# Patient Record
Sex: MALE | Race: BLACK OR AFRICAN AMERICAN | NOT HISPANIC OR LATINO | Employment: OTHER | URBAN - METROPOLITAN AREA
[De-identification: names, ages, dates, MRNs, and addresses within clinical notes are randomized per-mention and may not be internally consistent; named-entity substitution may affect disease eponyms.]

---

## 2017-01-19 ENCOUNTER — CLINICAL SUPPORT (OUTPATIENT)
Dept: REHABILITATION | Facility: HOSPITAL | Age: 72
End: 2017-01-19
Attending: INTERNAL MEDICINE
Payer: MEDICARE

## 2017-01-19 DIAGNOSIS — R26.9 GAIT DIFFICULTY: Primary | ICD-10-CM

## 2017-01-19 DIAGNOSIS — M62.81 MUSCLE WEAKNESS OF LOWER EXTREMITY: ICD-10-CM

## 2017-01-19 PROCEDURE — 97110 THERAPEUTIC EXERCISES: CPT | Performed by: PHYSICAL MEDICINE & REHABILITATION

## 2017-01-19 PROCEDURE — 97162 PT EVAL MOD COMPLEX 30 MIN: CPT | Performed by: PHYSICAL MEDICINE & REHABILITATION

## 2017-01-19 PROCEDURE — G8978 MOBILITY CURRENT STATUS: HCPCS | Mod: CL | Performed by: PHYSICAL MEDICINE & REHABILITATION

## 2017-01-19 PROCEDURE — G8979 MOBILITY GOAL STATUS: HCPCS | Mod: CK | Performed by: PHYSICAL MEDICINE & REHABILITATION

## 2017-01-19 NOTE — PLAN OF CARE
Create Note         My Note 8:48 AM   Edit              Name:Goyo Craig  Physician:Chrissy Hayes MD  Date of eval:1/19/2017  Orders: Physical Therapy evaluate and treat/Gait Training  Clinic: [id number]  Diagnosis:  1. Gait difficulty      2. Muscle weakness of lower extremity            Precautions: CVA  Evaluation date: 1/19/2017  Visit # authorized: 1/25  Authorization period: 12/31/17  Plan of care expiration: 3/2/17  Medicare Charge: $140.00  Medicare Cap Total: $140.00     Start time: 9:00 AM  Stop Time:10:00AM      Procedures: IE, TE     Timed:  Procedure: TE x2  Minutes: 30     Untimed:  Procedure:IE  Minutes:30     Total Timed Minutes:60  Total Timed Units:2  Total Untimed Units:1  Charged Billed # of Units:3        Subjective      Chief complaint: Patient with c/o weakness in both lower extremities. Patient had CVA about 2 years ago. Patient states has difficulty walking and uses rollator and able to walk about a block. States just sits most of the time.  Onset of pain : No c/o pain just weakness BLE's   Mechanism of onset : CVA  PMH: HTN,CVA,     Aggravating factors: sit to stand transfers; walking  Easing factors: N/A  Sleep is not disturbed. Sleeping position: sides  PLOF includes: Decreased strength of BLE's with difficulty walking  Current functional limitations: assistance with preparing meals and housecleaning--family helps; mostly stays at home-rarely goes out for socialization; walking  Prior Physical Therapy: June to August, 2016 at [facility] PT clinic. States did not perform HEP while attending last PT session.   Home/Living Environment:Lives in an apartment alone and has family that comes and checks in on him daily.     Pertinent PMH/Comorbidities: CVA     Patients structured exercise routine: none  Exercise routine prior to onset: None     Occupation: Patient is retired..     Allergies: Review of patient's allergies indicates:  No Known Allergies        MRI: None on file  "  Xray: None on file     Pain level with 0 being the lowest and 10 being the highest presently: 0/10  Pain level with 0 being the lowest and 10 being the highest at worst: 0/10  Pain level with 0 being the lowest and 10 being the highest at best: 0/10     Patient Goals: "I want ti have more range of motion in my legs and to walk better."     Objective      Postural examination in standing and sitting:  - (+)  forward head  -  (+) forward shoulders  - (-)  hip high  -(-)  shoulder high  - (+) decreased lordosis lumbar spine     Functional assessment:   - walking/gait:Ambulates using rollator taking very short steps and slow pace and tends to drag right foot in toe off.  - sit to stand: Uses both hands to push up from sitting and tends tto rock upper body back and forth  - sit to supine: Uses both hands to lower down to chair and lowers himself slowly   - supine to sit: patient very slow but able to push from supine to sit  - supine to prone: NT      AROM of bilateral hips, knees and ankles WFL     Flexibility testing:  - hamstrings: 90/90 test R -45 L -45   - gastrocnemius: DF ankle R 10 degrees L 10 degrees      - quadriceps: (+) tightness bilatrally   - hip flexors (+) Tightness bilaterally        Muscle Strength  MMT R L   Hip flexion 3/5 3/5   Hip abduction 3/5 3/5   Hip extension 3/5 3/5   Hip ER 3/5 3/5   Hip IR 3/5 3/5   Knee extension 4/5 4/5   Knee flexion 4/5 4/5   Ankle dorsiflexion 4/5 4/5   Ankle plantar flexion 4/5 4/5   Ankle inversion 4/5 4/5   Ankle eversion 4/5 4/5      Endurance is fair.     Special tests: The Timed Up and Go: 127 seconds  30 Second Chair stand Test: 0--uses hands  [place] Balance Test: 22/56  Poor balance and high risk for fall        Sensation: Intact     Outcome measures:   Impairment function: Mobility  FOTO score: 23/100  · G-code current: CL at least 60%, but < 80% impaired, limited or restricted  · G-code goal: CK at least 40% but < 60% impaired, limited or " restricted  · Severity modifier selections based on the FOTO scoring, objective findings, and co-morbidity assessment  · PT reviewed FOTO scores for Goyo Craig on 1/19/17.   · FOTO scores were entered into [employer] - see media section.     TREATMENT:  Therapeutic exercise: Goyo Kong received therapeutic exercises to develop strength, stabilization and endurance; flexibility and range of motion for 30 minutes including:see HEP sheet.   Supine:  Glut Sets X 10  Quad Sets X 10  Sitting:  Marching X 10  Hip ADD with pillow X 10  Hip ABd with YTB X 10  LAQ X 10        Pt. Education: Instructed pt. regarding:proper technique with all exercises. Pt. to demonstrate good understanding of the education provided. Goyo Kong demonstrated good return demonstration of activities. No cultural, environmental, or spiritual barriers identified to treatment or learning.     Assessment   This is a 71 y.o. male referred to outpatient physical therapy and presents with a medical diagnosis of bilateral lower extremity weakness and PT diagnosis/findings of bilateral lower extremity decreased strength and flexibility, poor balance and high risk for falls; gait instability demonstrating limitations as described in the problem list. Patient was in agreement with set goals and plan of care. Pt was given a HEP consisting of BLE strengthening regimen. Pt. verbally understood instructions and demonstrated proper form/technique. Pt was advised to perform these exercises free of pain, and discontinue use if symptoms persist/worsen. Pt will benefit from physical therapy services in order to maximize pain free functional independence. Rehab potential is fair.     History  Co-morbidities and personal factors that may impact the plan of care Examination  Body Structures and Functions, activity limitations and participation restrictions that may impact the plan of care Clinical Presentation Decision Making/ Complexity Score   Co-morbidities:    CVA                    Personal Factors:   AGE, education level, Attitude Body Regions: bilateral LE's     Body Systems: Musculoskeletal--decreased strength and flexibility; Neuromuscular: poor balance and gait instability              Activity limitations/Participation Restrictions: Mobility, general tasks and self-care; community and social life                       Evolving clinical presentation with changing clinical characteristics    Moderate           FOTO Score: 23/100            Medical necessity is demonstrated by the following IMPAIRMENTS/PROBLEM LIST:  Decreased flexibility BLE  Decreased strength BLE  Antalgic gait  Decreased ability with sit to stand; sit to supine and supine to sit transfers  Difficulty with performing ADL and household activities due to poor balance and LE weakness  Poor balance with standing and walking  Functional impairment rating of: 70%     GOALS:   Short Term Goals: 3 weeks  MORGAN Balance score= 36 or greater to improve balance with standing and walking  Increase strength 1/2 muscle grade bilateral LE's  Be able to perform HEP with minimal cueing required  Improve functional impairment of mobility to 50%     Long Term Goals: 6 weeks  IBerg Balance Score =40 or greater for improved balance with standing and walking and to decrease fall risk  Improve muscle strength 1 muscle grade  Improve muscle strength with MMT to 4/5 to 4+/5  Restore ability to ambulate with improved gait pattern using rollator in house and community  Restore ability to stand for ADL with improved balance  Restore ability to perform all transfers safely and independently  Restore ability to perform ADL's and household activities independently with improved balance  Improve functional impairment of mobility to 40%     Plan      Pt will be treated by physical therapy 1-3 times a week for 6 weeks to include: Therapeutic exercises to increase ROM, strength and stabilization; joint and soft tissue mobilization  with manual therapy techniques to decrease muscle tightness, pain and improve joint mobility; neuromuscular re-education to improve balance, coordination, kinesthetic sense and proprioception, therapeutic activities to improve coordination, strength and function, therapeutic taping to decrease pain, provide support and improve function of the LE(s); modalities such as moist heat, ice, ultrasound and electrical stimulation to increase circulation, decrease pain and inflammation; dry needling with manual therapy techniques to decrease pain, inflammation and swelling, increase circulation and promote healing process will be considered and utilized as needed; aquatic physical therapy will be utilized as needed. Pt may be seen by PTA to carry out plan of care as part of the Rehab team.     I certify the need for these services furnished under this plan of treatment and while under my care.     ____________________________________ Physician/Referring Practitioner               Date of Signature                 Marlyn Goodman PT

## 2017-01-19 NOTE — PROGRESS NOTES
Name:Goyo Kong Rafa  Physician:Chrissy Hayes MD  Date of eval:1/19/2017  Orders:  Physical Therapy evaluate and treat/Gait Training  Clinic: [id number]  Diagnosis:  1. Gait difficulty     2. Muscle weakness of lower extremity         Precautions: CVA  Evaluation date: 1/19/2017  Visit # authorized: 1/25  Authorization period: 12/31/17  Plan of care expiration: 3/2/17  Medicare Charge: $140.00  Medicare Cap Total: $140.00    Start time: 9:00 AM  Stop Time:10:00AM     Procedures:  IE, TE    Timed:  Procedure: TE x2  Minutes: 30    Untimed:  Procedure:IE  Minutes:30    Total Timed Minutes:60  Total Timed Units:2  Total Untimed Units:1  Charged Billed # of Units:3      Subjective     Chief complaint: Patient with c/o weakness in both lower extremities. Patient had CVA about 2 years ago.  Patient states has difficulty walking and uses rollator and able to walk about a block. States just sits most of the time.  Onset of pain : No c/o pain just weakness BLE's   Mechanism of onset :  CVA  PMH: HTN,CVA,    Aggravating factors: sit to stand transfers; walking  Easing factors: N/A  Sleep is not disturbed. Sleeping position: sides  PLOF includes: Decreased strength of BLE's with difficulty walking  Current functional limitations: assistance with preparing meals and housecleaning--family helps; mostly stays at home-rarely goes out for socialization; walking  Prior Physical Therapy: June to August, 2016 at [facility] PT clinic. States did not perform HEP while attending last PT session.   Home/Living Environment:Lives in an apartment alone and has family that comes and checks in on him daily.    Pertinent PMH/Comorbidities: CVA    Patients structured exercise routine: none  Exercise routine prior to onset: None    Occupation:  Patient is retired..    Allergies:  Review of patient's allergies indicates:  No Known Allergies      MRI: None on file           Xray: None on file    Pain level with 0 being the lowest and 10 being the  "highest presently: 0/10  Pain level with 0 being the lowest and 10 being the highest at worst: 0/10  Pain level with 0 being the lowest and 10 being the highest at best: 0/10     Patient Goals: "I want ti have more range of motion in my legs and to walk better."    Objective     Postural examination in standing and sitting:  -  (+)  forward head  -   (+) forward shoulders  - (-)  hip high  -(-)  shoulder high  - (+) decreased lordosis lumbar spine    Functional assessment:   - walking/gait:Ambulates using rollator taking very short steps and slow pace and tends to drag right foot in toe off.  - sit to stand: Uses both hands to push up from sitting and tends tto rock upper body back and forth  - sit to supine: Uses both hands to lower down to chair and lowers himself slowly       - supine to sit: patient very slow but able to push from supine to sit  - supine to prone: NT     AROM of bilateral hips, knees and ankles WFL    Flexibility testing:  - hamstrings:     90/90 test R -45 L -45           - gastrocnemius:   DF ankle R 10 degrees L 10 degrees                 - quadriceps: (+) tightness bilatrally               - hip flexors    (+)   Tightness bilaterally      Muscle Strength  MMT R L   Hip flexion 3/5 3/5   Hip abduction 3/5 3/5   Hip extension 3/5 3/5   Hip ER 3/5 3/5   Hip IR 3/5 3/5   Knee extension 4/5 4/5   Knee flexion 4/5 4/5   Ankle dorsiflexion 4/5 4/5   Ankle plantar flexion 4/5 4/5   Ankle inversion 4/5 4/5   Ankle eversion 4/5 4/5     Endurance is fair.    Special tests: The Timed Up and Go: 127 seconds                          30 Second Chair stand Test: 0--uses hands                          [place] Balance Test:  22/56  Poor balance and high risk for fall      Sensation: Intact    Outcome measures:    Impairment function:  Mobility  FOTO  score: 23/100  G-code current: CL at least 60%, but < 80% impaired, limited or restricted  G-code goal: CK at least 40% but < 60% impaired, limited or " restricted  Severity modifier selections based on the FOTO scoring, objective findings, and co-morbidity assessment  PT reviewed FOTO scores for Goyo Craig on 1/19/17.   FOTO scores were entered into [employer] - see media section.    TREATMENT:  Therapeutic exercise: Goyo Kong received therapeutic exercises to develop strength, stabilization and endurance; flexibility and range of motion for 30 minutes including:see HEP sheet.   Supine:  Glut Sets  X 10  Quad Sets  X 10  Sitting:  Marching  X 10  Hip ADD with pillow   X 10  Hip ABd with YTB    X 10  LAQ     X 10      Pt. Education: Instructed pt. regarding:proper technique with all exercises. Pt. to demonstrate good understanding of the education provided. Goyo Kong demonstrated good return demonstration of activities. No cultural, environmental, or spiritual barriers identified to treatment or learning.    Assessment   This is a 71 y.o. male referred to outpatient physical therapy and presents with a medical diagnosis of bilateral lower extremity weakness and PT diagnosis/findings of bilateral lower extremity decreased strength and flexibility, poor balance and high risk for falls; gait instability demonstrating limitations as described in the problem list. Patient was in agreement with set goals and plan of care. Pt was given a HEP consisting of BLE strengthening regimen. Pt. verbally understood instructions and demonstrated proper form/technique. Pt was advised to perform these exercises free of pain, and discontinue use if symptoms persist/worsen. Pt will benefit from physical therapy services in order to maximize pain free functional independence. Rehab potential is fair.    History  Co-morbidities and personal factors that may impact the plan of care Examination  Body Structures and Functions, activity limitations and participation restrictions that may impact the plan of care Clinical Presentation   Decision Making/ Complexity Score   Co-morbidities:    CVA              Personal Factors:   AGE, education level, Attitude Body Regions: bilateral LE's    Body Systems:  Musculoskeletal--decreased strength and flexibility; Neuromuscular: poor balance and gait instability          Activity limitations/Participation Restrictions:  Mobility, general tasks and self-care; community and social life                  Evolving clinical presentation with changing clinical characteristics   Moderate        FOTO Score: 23/100         Medical necessity is demonstrated by the following IMPAIRMENTS/PROBLEM LIST:  Decreased flexibility BLE  Decreased strength BLE  Antalgic gait  Decreased ability with sit to stand; sit to supine and supine to sit transfers  Difficulty with performing ADL and household activities due to poor balance and LE weakness  Poor balance with standing and walking  Functional impairment rating of: 70%    GOALS:   Short Term Goals:  3 weeks  MORGAN Balance score= 36 or greater to improve balance with standing and walking  Increase strength 1/2 muscle grade bilateral LE's  Be able to perform HEP with minimal cueing required  Improve functional impairment of mobility to 50%    Long Term Goals: 6 weeks  IBerg Balance Score =40 or greater for improved balance with standing and walking and to decrease fall risk  Improve muscle strength 1 muscle grade  Improve muscle strength with MMT to 4/5 to 4+/5  Restore ability to ambulate with improved gait pattern using rollator in house and community  Restore ability to stand for ADL with improved balance  Restore ability to perform all transfers safely and independently  Restore ability to perform ADL's and household activities independently with improved balance  Improve functional impairment of mobility to 40%    Plan     Pt will be treated by physical therapy 1-3 times a week for 6 weeks to include: Therapeutic exercises to increase ROM, strength and stabilization; joint and soft tissue mobilization with manual therapy  techniques to decrease muscle tightness, pain and improve joint mobility; neuromuscular re-education to improve balance, coordination, kinesthetic sense and proprioception, therapeutic activities to improve coordination, strength and function, therapeutic taping to decrease pain, provide support and improve function of the LE(s); modalities such as moist heat, ice, ultrasound and electrical stimulation to increase circulation, decrease pain and inflammation; dry needling with manual therapy techniques to decrease pain, inflammation and swelling, increase circulation and promote healing process will be considered and utilized as needed;  aquatic physical therapy will be utilized as needed.  Pt may be seen by PTA to carry out plan of care as part of the Rehab team.    I certify the need for these services furnished under this plan of treatment and while under my care.    ____________________________________ Physician/Referring Practitioner                                Date of Signature            Marlyn Goodman PT

## 2017-01-21 ENCOUNTER — HOSPITAL ENCOUNTER (OUTPATIENT)
Facility: HOSPITAL | Age: 72
Discharge: HOME | End: 2017-01-23
Attending: EMERGENCY MEDICINE | Admitting: INTERNAL MEDICINE
Payer: MEDICARE

## 2017-01-21 DIAGNOSIS — R55 SYNCOPE AND COLLAPSE: Primary | ICD-10-CM

## 2017-01-21 DIAGNOSIS — R00.1 BRADYCARDIA: ICD-10-CM

## 2017-01-21 LAB
ALBUMIN SERPL BCP-MCNC: 3.2 G/DL
ALP SERPL-CCNC: 77 U/L
ALT SERPL W/O P-5'-P-CCNC: 13 U/L
ANION GAP SERPL CALC-SCNC: 12 MMOL/L
AST SERPL-CCNC: 18 U/L
BASOPHILS # BLD AUTO: 0.01 K/UL
BASOPHILS NFR BLD: 0.1 %
BILIRUB SERPL-MCNC: 0.5 MG/DL
BUN SERPL-MCNC: 13 MG/DL
CALCIUM SERPL-MCNC: 8.7 MG/DL
CHLORIDE SERPL-SCNC: 112 MMOL/L
CO2 SERPL-SCNC: 16 MMOL/L
CREAT SERPL-MCNC: 0.8 MG/DL
DIFFERENTIAL METHOD: ABNORMAL
EOSINOPHIL # BLD AUTO: 0.1 K/UL
EOSINOPHIL NFR BLD: 1.1 %
ERYTHROCYTE [DISTWIDTH] IN BLOOD BY AUTOMATED COUNT: 15.2 %
EST. GFR  (AFRICAN AMERICAN): >60 ML/MIN/1.73 M^2
EST. GFR  (NON AFRICAN AMERICAN): >60 ML/MIN/1.73 M^2
GLUCOSE SERPL-MCNC: 108 MG/DL
HCT VFR BLD AUTO: 42.5 %
HGB BLD-MCNC: 13.8 G/DL
INR PPP: 1.3
LYMPHOCYTES # BLD AUTO: 1.5 K/UL
LYMPHOCYTES NFR BLD: 18.8 %
MCH RBC QN AUTO: 28.5 PG
MCHC RBC AUTO-ENTMCNC: 32.5 %
MCV RBC AUTO: 88 FL
MONOCYTES # BLD AUTO: 0.6 K/UL
MONOCYTES NFR BLD: 6.9 %
NEUTROPHILS # BLD AUTO: 5.9 K/UL
NEUTROPHILS NFR BLD: 73 %
PLATELET # BLD AUTO: 187 K/UL
PMV BLD AUTO: 10.3 FL
POTASSIUM SERPL-SCNC: 4.8 MMOL/L
PROT SERPL-MCNC: 6.7 G/DL
PROTHROMBIN TIME: 14.7 SEC
RBC # BLD AUTO: 4.85 M/UL
SODIUM SERPL-SCNC: 140 MMOL/L
TROPONIN I SERPL DL<=0.01 NG/ML-MCNC: <0.006 NG/ML
WBC # BLD AUTO: 8.07 K/UL

## 2017-01-21 PROCEDURE — 80053 COMPREHEN METABOLIC PANEL: CPT

## 2017-01-21 PROCEDURE — 85610 PROTHROMBIN TIME: CPT

## 2017-01-21 PROCEDURE — 85025 COMPLETE CBC W/AUTO DIFF WBC: CPT

## 2017-01-21 PROCEDURE — 99285 EMERGENCY DEPT VISIT HI MDM: CPT

## 2017-01-21 PROCEDURE — 93005 ELECTROCARDIOGRAM TRACING: CPT

## 2017-01-21 PROCEDURE — G0378 HOSPITAL OBSERVATION PER HR: HCPCS

## 2017-01-21 PROCEDURE — 93010 ELECTROCARDIOGRAM REPORT: CPT | Mod: ,,, | Performed by: INTERNAL MEDICINE

## 2017-01-21 PROCEDURE — 84484 ASSAY OF TROPONIN QUANT: CPT

## 2017-01-21 PROCEDURE — 25000003 PHARM REV CODE 250: Performed by: EMERGENCY MEDICINE

## 2017-01-21 RX ORDER — ASPIRIN 325 MG
325 TABLET ORAL
Status: COMPLETED | OUTPATIENT
Start: 2017-01-21 | End: 2017-01-21

## 2017-01-21 RX ORDER — TAMSULOSIN HYDROCHLORIDE 0.4 MG/1
0.4 CAPSULE ORAL DAILY
Status: DISCONTINUED | OUTPATIENT
Start: 2017-01-22 | End: 2017-01-23 | Stop reason: HOSPADM

## 2017-01-21 RX ORDER — SODIUM CHLORIDE 9 MG/ML
INJECTION, SOLUTION INTRAVENOUS CONTINUOUS
Status: DISCONTINUED | OUTPATIENT
Start: 2017-01-21 | End: 2017-01-23 | Stop reason: HOSPADM

## 2017-01-21 RX ORDER — ENOXAPARIN SODIUM 100 MG/ML
40 INJECTION SUBCUTANEOUS EVERY 24 HOURS
Status: DISCONTINUED | OUTPATIENT
Start: 2017-01-22 | End: 2017-01-23 | Stop reason: HOSPADM

## 2017-01-21 RX ORDER — LISINOPRIL 5 MG/1
5 TABLET ORAL DAILY
Status: DISCONTINUED | OUTPATIENT
Start: 2017-01-22 | End: 2017-01-22

## 2017-01-21 RX ORDER — NAPROXEN SODIUM 220 MG/1
81 TABLET, FILM COATED ORAL DAILY
Status: DISCONTINUED | OUTPATIENT
Start: 2017-01-22 | End: 2017-01-23 | Stop reason: HOSPADM

## 2017-01-21 RX ADMIN — ASPIRIN 325 MG ORAL TABLET 325 MG: 325 PILL ORAL at 07:01

## 2017-01-21 NOTE — IP AVS SNAPSHOT
[facility]  [address]  [place] [address]  Phone: [phone]           Patient Discharge Instructions     Our goal is to set you up for success. This packet includes information on your condition, medications, and your home care. It will help you to care for yourself so you don't get sicker and need to go back to the hospital.     Please ask your nurse if you have any questions.        There are many details to remember when preparing to leave the hospital. Here is what you will need to do:    1. Take your medicine. If you are prescribed medications, review your Medication List in the following pages. You may have new medications to  at the pharmacy and others that you'll need to stop taking. Review the instructions for how and when to take your medications. Talk with your doctor or nurses if you are unsure of what to do.     2. Go to your follow-up appointments. Specific follow-up information is listed in the following pages. Your may be contacted by a transition nurse or clinical provider about future appointments. Be sure we have all of the phone numbers to reach you, if needed. Please contact your provider's office if you are unable to make an appointment.     3. Watch for warning signs. Your doctor or nurse will give you detailed warning signs to watch for and when to call for assistance. These instructions may also include educational information about your condition. If you experience any of warning signs to your health, call your doctor.               ** Verify the list of medication(s) below is accurate and up to date. Carry this with you in case of emergency. If your medications have changed, please notify your healthcare provider.             Medication List      START taking these medications        Additional Info                      atorvastatin 20 MG tablet   Commonly known as:  LIPITOR   Quantity:  90 tablet   Refills:  3   Dose:  20 mg    Last time this was given:   20 mg on 1/23/2017  9:40 AM   Instructions:  Take 1 tablet (20 mg total) by mouth once daily.     Begin Date    AM    Noon    PM    Bedtime         CONTINUE taking these medications        Additional Info                      aspirin 81 MG Chew   Refills:  0   Dose:  81 mg    Last time this was given:  81 mg on 1/23/2017  9:40 AM   Instructions:  Take 1 tablet (81 mg total) by mouth once daily.     Begin Date    AM    Noon    PM    Bedtime       baclofen 10 MG tablet   Commonly known as:  LIORESAL   Refills:  0   Dose:  10 mg    Instructions:  Take 10 mg by mouth 3 (three) times daily as needed (muscle spasm).     Begin Date    AM    Noon    PM    Bedtime       cilostazol 100 MG Tab   Commonly known as:  PLETAL   Refills:  0   Dose:  50 mg    Instructions:  Take 50 mg by mouth 2 (two) times daily.     Begin Date    AM    Noon    PM    Bedtime       lisinopril 5 MG tablet   Commonly known as:  PRINIVIL,ZESTRIL   Refills:  0   Dose:  5 mg    Last time this was given:  5 mg on 1/23/2017  9:40 AM   Instructions:  Take 5 mg by mouth once daily.     Begin Date    AM    Noon    PM    Bedtime       tamsulosin 0.4 mg Cp24   Commonly known as:  FLOMAX   Quantity:  30 capsule   Refills:  11   Dose:  0.4 mg    Last time this was given:  0.4 mg on 1/23/2017  9:40 AM   Instructions:  Take 1 capsule (0.4 mg total) by mouth once daily.     Begin Date    AM    Noon    PM    Bedtime            Where to Get Your Medications      These medications were sent to [facility] mail order [address], [place] [address] [facility] mail services  [facility] [facility] [address]     Phone:  [phone]     atorvastatin 20 MG tablet                  Please bring to all follow up appointments:    1. A copy of your discharge instructions.  2. All medicines you are currently taking in their original bottles.  3. Identification and insurance card.    Please arrive 15 minutes ahead of scheduled appointment  time.    Please call 24 hours in advance if you must reschedule your appointment and/or time.        Your Scheduled Appointments     Jan 24, 2017 10:00 AM CST   Established Physical Therapy with Rory Boo PTA   [facility]    [address]  [place] [address]   [phone]            Jan 26, 2017 11:00 AM CST   Established Physical Therapy with Marlyn Goodman, PT   [facility]    [address]  [place] [address]   [phone]            Jan 31, 2017 10:00 AM CST   Established Physical Therapy with Rory Boo PTA   [facility]    [address]  [place] [address]   [phone]            Feb 02, 2017 10:00 AM CST   Established Physical Therapy with Rory Boo PTA   [facility]    [address]  [place] [address]   [phone]            Feb 07, 2017 10:20 AM CST   Established Patient Visit with Sean Rios MD   [facility]    [address] [facility] [facility], [address]  [place] [address]   [phone]              Follow-up Information     Follow up with Chrissy Hayes MD. Go on 1/31/2017.    Specialty:  Internal Medicine    Why:  @3pm    Contact information:    [address]  [address]  [facility] [place] [address]  [phone]          Follow up with Sean Rios MD. Go on 2/7/2017.    Specialty:  Cardiology    Why:  @10:20am    Contact information:    [address]  [address]  [place] [address]  [phone]        Referrals     Future Orders    Referral to OutPatient  Physical therapy     Questions:    Post Surgical?:  No    Eval and Treat:  Yes    Duration:      Frequency (times per week):      Precautions:      Location:      OT Location:      Restore Functional ADL Training?:      Gait Training:      Therapeutic Exercises:      Wound Care:      Traction:      Electric Stimulation:   "    IONTO.:      U/S:      Developmental Stimulation?:      Specialty Programs:          Discharge Instructions     Future Orders    Activity as tolerated     Call MD for:  difficulty breathing or increased cough     Call MD for:  increased confusion or weakness     Call MD for:  persistent dizziness, light-headedness, or visual disturbances     Call MD for:  persistent nausea and vomiting or diarrhea     Call MD for:  severe persistent headache     Call MD for:  severe uncontrolled pain     Call MD for:  temperature >100.4     Call MD for:  worsening rash     Call MD for:     Comments:    Any other concerning symptoms    Diet general     Questions:    Total calories:      Fat restriction, if any:      Protein restriction, if any:      Na restriction, if any:      Fluid restriction:      Additional restrictions:        Discharge References/Attachments     SYNCOPE, DIAGNOSING (ENGLISH)    SYNCOPE, CAUSES OF (ENGLISH)    SYNCOPE, TREATING: PREVENTION (ENGLISH)    ATORVASTATIN (ENGLISH)        Primary Diagnosis     Your primary diagnosis was:  Fainting      Admission Information     Date & Time Provider Department CSN    1/21/2017  2:56 PM Mario Thompson MD [facility] [id number]      Care Providers     Provider Role Specialty Primary office phone    Mario Thompson MD Attending Provider Internal Medicine [phone]    Jim Ni MD Team Attending  Internal Medicine [phone]    Mario Thompson MD Team Attending  Internal Medicine [phone]    Shashi Yoo MD Consulting Physician  Cardiology [phone]      Your Vitals Were     BP Pulse Temp Resp Height Weight    152/105 (BP Location: Right arm, Patient Position: Standing, BP Method: Automatic) 80 98 °F (36.7 °C) (Oral) 20 5' 7" (1.702 m) 82.9 kg (182 lb 11.2 oz)    SpO2 BMI             94% 28.61 kg/m2         Recent Lab Values        12/31/2014                           9:55 AM           A1C 4.6                       Allergies " as of 1/23/2017     No Known Allergies      [facility]     [facility] Nurse Care Line - 24/7 Assistance  Unless otherwise directed by your provider, please contact [facility], our nurse care line that is available for 24/7 assistance.     Registered nurses in the [facility] provide clinical advisement, health education, appointment booking, and other advisory services.  Call for this free service at [phone].        Advance Directives     An advance directive is a document which, in the event you are no longer able to make decisions for yourself, tells your healthcare team what kind of treatment you do or do not want to receive, or who you would like to make those decisions for you.  If you do not currently have an advance directive, [facility] encourages you to create one.  For more information call:  [phone]), [phone] ([phone]),  or log on to [email or web address].        Smoking Cessation     If you would like to quit smoking:   You may be eligible for free services if you are a [place] resident and started smoking cigarettes before September 1, 1988.  Call the Smoking Cessation Trust ([facility]) toll free at (985) 409-3839 or (885) 838-4021.   Call 3-855-QUIT-NOW if you do not meet the above criteria.            Language Assistance Services     ATTENTION: Language assistance services are available, free of charge. Please call [phone].      ATENCIÓN: Si habla español, tiene a banda disposición servicios gratuitos de asistencia lingüística. Llame al [phone].     CHÚ Ý: N?u b?n nói Ti?ng Vi?t, có các d?ch v? h? tr? ngôn ng? mi?n phí dành cho b?n. G?i s? [phone].        [facility] Sign-Up     Activating your [facility] account is as easy as 1-2-3!     1) Visit [email or web address], select Sign Up Now, enter this activation code and your date of birth, then select Next.  [email or web address]  Expires: 3/9/2017  4:07 PM      2) Create a username and  password to use when you visit [facility] in the future and select a security question in case you lose your password and select Next.    3) Enter your e-mail address and click Sign Up!    Additional Information  If you have questions, please e-mail Alex and [email or web address] or call [phone] to talk to our [facility] staff. Remember, [facility] is NOT to be used for urgent needs. For medical emergencies, dial 911.          [facility] complies with applicable Federal civil rights laws and does not discriminate on the basis of race, color, national origin, age, disability, or sex.

## 2017-01-21 NOTE — ED NOTES
Pt reports to ed from nursing home following syncopal episode. EMS states that they were called with report of non-responsive pt, not breathing. Upon EMS arrival, pt was aaox4, breathing, stable vitals. Upon arrival to hospital, pt is oriented only to place and self. Skin cool. Bradycardic on cardiac monitor. Reports generalized weakness, denies pain. Hand  strong bilaterally.
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
## 2017-01-22 LAB
ALBUMIN SERPL BCP-MCNC: 3 G/DL
ALP SERPL-CCNC: 70 U/L
ALT SERPL W/O P-5'-P-CCNC: 10 U/L
ANION GAP SERPL CALC-SCNC: 7 MMOL/L
AST SERPL-CCNC: 12 U/L
BASOPHILS # BLD AUTO: 0.01 K/UL
BASOPHILS NFR BLD: 0.2 %
BILIRUB SERPL-MCNC: 0.5 MG/DL
BUN SERPL-MCNC: 13 MG/DL
CALCIUM SERPL-MCNC: 8.9 MG/DL
CHLORIDE SERPL-SCNC: 111 MMOL/L
CHOLEST/HDLC SERPL: 2.7 {RATIO}
CO2 SERPL-SCNC: 23 MMOL/L
CREAT SERPL-MCNC: 0.8 MG/DL
DIFFERENTIAL METHOD: ABNORMAL
EOSINOPHIL # BLD AUTO: 0.1 K/UL
EOSINOPHIL NFR BLD: 2.4 %
ERYTHROCYTE [DISTWIDTH] IN BLOOD BY AUTOMATED COUNT: 15.3 %
EST. GFR  (AFRICAN AMERICAN): >60 ML/MIN/1.73 M^2
EST. GFR  (NON AFRICAN AMERICAN): >60 ML/MIN/1.73 M^2
FERRITIN SERPL-MCNC: 690 NG/ML
GLUCOSE SERPL-MCNC: 79 MG/DL
HCT VFR BLD AUTO: 39.3 %
HDL/CHOLESTEROL RATIO: 36.8 %
HDLC SERPL-MCNC: 125 MG/DL
HDLC SERPL-MCNC: 46 MG/DL
HGB BLD-MCNC: 12.9 G/DL
IRON SERPL-MCNC: 59 UG/DL
LDLC SERPL CALC-MCNC: 66 MG/DL
LYMPHOCYTES # BLD AUTO: 2 K/UL
LYMPHOCYTES NFR BLD: 40 %
MAGNESIUM SERPL-MCNC: 1.8 MG/DL
MCH RBC QN AUTO: 28 PG
MCHC RBC AUTO-ENTMCNC: 32.8 %
MCV RBC AUTO: 85 FL
MONOCYTES # BLD AUTO: 0.4 K/UL
MONOCYTES NFR BLD: 8.9 %
NEUTROPHILS # BLD AUTO: 2.4 K/UL
NEUTROPHILS NFR BLD: 48.5 %
NONHDLC SERPL-MCNC: 79 MG/DL
PHOSPHATE SERPL-MCNC: 2.7 MG/DL
PLATELET # BLD AUTO: 170 K/UL
PMV BLD AUTO: 10.4 FL
POTASSIUM SERPL-SCNC: 3.7 MMOL/L
PROT SERPL-MCNC: 6.5 G/DL
RBC # BLD AUTO: 4.6 M/UL
SATURATED IRON: 25 %
SODIUM SERPL-SCNC: 141 MMOL/L
TOTAL IRON BINDING CAPACITY: 235 UG/DL
TRANSFERRIN SERPL-MCNC: 159 MG/DL
TRIGL SERPL-MCNC: 65 MG/DL
TROPONIN I SERPL DL<=0.01 NG/ML-MCNC: 0.01 NG/ML
TROPONIN I SERPL DL<=0.01 NG/ML-MCNC: 0.01 NG/ML
WBC # BLD AUTO: 4.92 K/UL

## 2017-01-22 PROCEDURE — 36415 COLL VENOUS BLD VENIPUNCTURE: CPT

## 2017-01-22 PROCEDURE — 25000003 PHARM REV CODE 250: Performed by: STUDENT IN AN ORGANIZED HEALTH CARE EDUCATION/TRAINING PROGRAM

## 2017-01-22 PROCEDURE — 80053 COMPREHEN METABOLIC PANEL: CPT

## 2017-01-22 PROCEDURE — 85025 COMPLETE CBC W/AUTO DIFF WBC: CPT

## 2017-01-22 PROCEDURE — 84466 ASSAY OF TRANSFERRIN: CPT

## 2017-01-22 PROCEDURE — 93010 ELECTROCARDIOGRAM REPORT: CPT | Mod: 76,,, | Performed by: INTERNAL MEDICINE

## 2017-01-22 PROCEDURE — 63600175 PHARM REV CODE 636 W HCPCS: Performed by: STUDENT IN AN ORGANIZED HEALTH CARE EDUCATION/TRAINING PROGRAM

## 2017-01-22 PROCEDURE — 84484 ASSAY OF TROPONIN QUANT: CPT | Mod: 91

## 2017-01-22 PROCEDURE — 84484 ASSAY OF TROPONIN QUANT: CPT

## 2017-01-22 PROCEDURE — 97530 THERAPEUTIC ACTIVITIES: CPT

## 2017-01-22 PROCEDURE — 97161 PT EVAL LOW COMPLEX 20 MIN: CPT

## 2017-01-22 PROCEDURE — 93005 ELECTROCARDIOGRAM TRACING: CPT

## 2017-01-22 PROCEDURE — G8978 MOBILITY CURRENT STATUS: HCPCS | Mod: CK

## 2017-01-22 PROCEDURE — 83735 ASSAY OF MAGNESIUM: CPT

## 2017-01-22 PROCEDURE — 82728 ASSAY OF FERRITIN: CPT

## 2017-01-22 PROCEDURE — 97116 GAIT TRAINING THERAPY: CPT | Mod: 59

## 2017-01-22 PROCEDURE — G8979 MOBILITY GOAL STATUS: HCPCS | Mod: CJ

## 2017-01-22 PROCEDURE — 83540 ASSAY OF IRON: CPT

## 2017-01-22 PROCEDURE — G0378 HOSPITAL OBSERVATION PER HR: HCPCS

## 2017-01-22 PROCEDURE — 84100 ASSAY OF PHOSPHORUS: CPT

## 2017-01-22 PROCEDURE — 94761 N-INVAS EAR/PLS OXIMETRY MLT: CPT

## 2017-01-22 PROCEDURE — 80061 LIPID PANEL: CPT

## 2017-01-22 PROCEDURE — 93010 ELECTROCARDIOGRAM REPORT: CPT | Mod: ,,, | Performed by: INTERNAL MEDICINE

## 2017-01-22 RX ADMIN — ENOXAPARIN SODIUM 40 MG: 100 INJECTION SUBCUTANEOUS at 12:01

## 2017-01-22 RX ADMIN — SODIUM CHLORIDE: 0.9 INJECTION, SOLUTION INTRAVENOUS at 01:01

## 2017-01-22 RX ADMIN — TAMSULOSIN HYDROCHLORIDE 0.4 MG: 0.4 CAPSULE ORAL at 09:01

## 2017-01-22 RX ADMIN — SODIUM CHLORIDE: 0.9 INJECTION, SOLUTION INTRAVENOUS at 11:01

## 2017-01-22 RX ADMIN — ASPIRIN 81 MG 81 MG: 81 TABLET ORAL at 09:01

## 2017-01-22 RX ADMIN — SODIUM CHLORIDE: 0.9 INJECTION, SOLUTION INTRAVENOUS at 12:01

## 2017-01-22 NOTE — PLAN OF CARE
01/22/17 1158   NAM Message   Medicare Outpatient and Observation Notification regarding financial responsibility Given to patient/caregiver;Signed/date by patient/caregiver   Date NAM was signed 01/22/17   Time NAM was signed 1151     Angela Dalton RN Transitional Navigator  [phone]

## 2017-01-22 NOTE — PLAN OF CARE
Problem: Patient Care Overview  Goal: Plan of Care Review  Outcome: Ongoing (interventions implemented as appropriate)  PT is in bed with no S/S of pain or distress. He is on fall precautions with bed lowered, wheels locked, and call bell within reach. PT is NSR on tele, with no ectopy. He has NS running at 100c per hour. He has not had any syncope episodes and is currently stable. Will continue to monitor. Will report off to oncoming nurse.

## 2017-01-22 NOTE — ED NOTES
Called [facility] to find out patients med list. Joe at [facility] [place] stated no one would be able to get to the office to confirm med list
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
## 2017-01-22 NOTE — PLAN OF CARE
Future Appointments  Date Time Provider Department Center   1/24/2017 10:00 AM Rory MARLOW TONO Boo RVPH [facility]   1/26/2017 11:00 AM Marlyn Goodman PT [facility]   1/31/2017 10:00 AM Rory AVRILBen RajeevTONO RVPH [facility]   2/2/2017 10:00 AM Rory MACKENZIEBen Boo PTA [facility]          01/22/17 1158   Discharge Assessment   Assessment Type Discharge Planning Assessment   Confirmed/corrected address and phone number on facesheet? Yes   Assessment information obtained from? Patient   Expected Length of Stay (days) 2   Communicated expected length of stay with patient/caregiver yes   Prior to hospitilization cognitive status: Alert/Oriented   Prior to hospitalization functional status: Independent   Current cognitive status: Alert/Oriented   Current Functional Status: Independent;Assistive Equipment   Arrived From home or self-care   Lives With alone   Able to Return to Prior Arrangements yes   Is patient able to care for self after discharge? Yes   How many people do you have in your home that can help with your care after discharge? 0   Who are your caregiver(s) and their phone number(s)? Renay Britton [phone]   Patient's perception of discharge disposition home or selfcare   Readmission Within The Last 30 Days no previous admission in last 30 days   Patient currently being followed by outpatient case management? No   Patient currently receives home health services? No   Does the patient currently use HME? Yes   Patient currently receives private duty nursing? No   Patient currently receives any other outside agency services? No   Equipment Currently Used at Home walker, rolling   Do you have any problems affording any of your prescribed medications? No   Is the patient taking medications as prescribed? yes   Do you have any financial concerns preventing you from receiving the healthcare you need? No   Does the patient have transportation to  healthcare appointments? Yes   Transportation Available family or friend will provide  (Brother- Tyler Britton [phone])   On Dialysis? No   Does the patient receive services at the [facility]mad[facility] Clinic? No   Are there any open cases? No   Discharge Plan A Home   Discharge Plan B Home with family   Patient/Family In Agreement With Plan yes     Angela Dalton RN Transitional Navigator  [phone]

## 2017-01-22 NOTE — PLAN OF CARE
Problem: Patient Care Overview  Goal: Plan of Care Review  Outcome: Ongoing (interventions implemented as appropriate)  Sinus rhythm on monitor. Patient oriented to self, place, and situation. NPO. Review the plan of care with patient. Pt verbalized understanding the plan of care. Fall precautions maintained. Bed alarm set, bed in low and locked position, side rails up x 3, nonskid socks applied, call light within reach. Instructed patient to use call light for assistance when needed. Patient verbalized understanding. Continue to monitor
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
## 2017-01-22 NOTE — PLAN OF CARE
Problem: Physical Therapy Goal  Goal: Physical Therapy Goal  Goals to be met by: 17      Patient will increase functional independence with mobility by performin. Supine to sit with Modified [place]  2. Sit to supine with Modified [place]  3. Sit to stand transfer with Modified [place] and Supervision  4. Gait x 30 feet with Modified [place] and Supervision using Rolling Walker.   5. Lower extremity exercise program x15 reps     Rec return to assisted living and continue OP PT.  Outcome: Ongoing (interventions implemented as appropriate)  Rec return to assisted living and continue with OP PT which was recently initiated days prior to hosp admit.  Unclear level of assistance pt was receiving premorbidly/baseline.  Pt may need increased assistance level upon discharge to improve safety.

## 2017-01-22 NOTE — PROGRESS NOTES
"[facility] IM Resident HO-I Progress Note    Subjective:      Goyo Craig is a 71 y.o. male who is being followed by the [facility] IM service at [facility] for Syncopal episode.    No complaints this morning, eager to go home.  Denies CP, SOB, cough, fever, chills.     Objective:   Last 24 Hour Vital Signs:  BP  Min: 100/60  Max: 160/96  Temp  Av.2 °F (36.8 °C)  Min: 98.1 °F (36.7 °C)  Max: 98.2 °F (36.8 °C)  Pulse  Av.8  Min: 41  Max: 80  Resp  Av.3  Min: 16  Max: 20  SpO2  Av.6 %  Min: 95 %  Max: 100 %  Height  Av' 7" (170.2 cm)  Min: 5' 7" (170.2 cm)  Max: 5' 7" (170.2 cm)  Weight  Av.9 kg (182 lb 11.2 oz)  Min: 82.9 kg (182 lb 11.2 oz)  Max: 82.9 kg (182 lb 11.2 oz)  I/O last 3 completed shifts:  In: -   Out: 225 [Urine:225]    Physical Examination:  Gen: NAD  HENT: NCAT, EOMI, MMM  CV: RRR, no murmurs auscultated  Pulm: CTAB  Abd: BS+, nontender, nondistended. Well healed midline scar  Extremities: 2+ radial and dorsalis pedis pulses. No cyanosis, clubbing, or edema.  Skin: no rash or lesion.  Neuro: moving all extremities well, 5/5 strength to shoulder shrug, elbow flexion and extension, foot dorsiflexion and plantarflexion, and hip flexion bilaterally.  Psych: AAOx3, calm, cooperative, converstional      Laboratory:  Laboratory Data Reviewed: yes  Pertinent Findings:    Recent Labs  Lab 17  1531 17  1620 17  0553   WBC  --  8.07 4.92   HGB  --  13.8* 12.9*   HCT  --  42.5 39.3*   PLT  --  187  --    MCV  --  88 85   RDW  --  15.2* 15.3*     --  141   K 4.8  --  3.7   *  --  111*   CO2 16*  --  23   BUN 13  --  13   CREATININE 0.8  --  0.8     --  79   PROT 6.7  --  6.5   ALBUMIN 3.2*  --  3.0*   BILITOT 0.5  --  0.5   AST 18  --  12   ALKPHOS 77  --  70   ALT 13  --  10     Microbiology Data Reviewed: yes  Pertinent Findings:  none    Other Results:  EKG (my interpretation): normal EKG, normal sinus rhythm, previous rhythm sinus " kyra.    Radiology Data Reviewed: yes  Pertinent Findings:  None new    Current Medications:     Infusions:   sodium chloride 0.9% 100 mL/hr at 01/22/17 0148        Scheduled:   aspirin  81 mg Oral Daily    enoxaparin  40 mg Subcutaneous Daily    tamsulosin  0.4 mg Oral Daily        PRN:      Antibiotics and Day Number of Therapy:  none    Lines and Day Number of Therapy:  PIV    Assessment:     Goyo Craig is a 71 y.o.male with  Patient Active Problem List    Diagnosis Date Noted    Syncope and collapse 01/21/2017    Gait difficulty 01/19/2017    Muscle weakness of lower extremity 01/19/2017    Muscle weakness 06/10/2016    Decreased range of motion of lumbar spine 06/10/2016    Difficulty walking 06/10/2016    Pain aggravated by activities of daily living 06/10/2016    Syncope 03/22/2015    Weakness of left upper extremity 03/05/2015    Right leg weakness 03/05/2015    Urinary frequency 03/05/2015    CAD S/P percutaneous coronary angioplasty 03/04/2015    Old lacunar stroke without late effect 03/04/2015    Benign essential hypertension 03/04/2015    Pre-syncope 03/04/2015        Plan:     Syncopal episode  - admit to obs for syncope workup  - CT head negative  - electrolytes wnl  - repeat echo, neuro checks overnight  - gentle fluids     History of MI with stent  - continue home ASA  - lipid panel and will consider statin  - holding ace inhibitor for orthostatic hypotension  - troponins negative trended overnight.    Normocytic Anemia  - ferritin elevated, HAILE unlikely  - mild possibly dilutional    BPH  - continue flomax     Tobacco use  - smokes several cigarettes/week  - counseled on cessation    HCM  - lipid panel wnl     Diet: cardiac  Prophylaxis: lovenox     Dispo: pending echo    Vito Dalal MD HO-1  [facility] Internal Medicine Team B    [facility] Medicine Hospitalist Pager numbers:   [facility] Hospitalist Medicine Team A (Jayro/Chriss): [phone]  [facility] Hospitalist Medicine Team B  (Raine/Jay):  [phone]

## 2017-01-23 VITALS
RESPIRATION RATE: 20 BRPM | SYSTOLIC BLOOD PRESSURE: 152 MMHG | WEIGHT: 182.69 LBS | BODY MASS INDEX: 28.67 KG/M2 | HEIGHT: 67 IN | HEART RATE: 80 BPM | OXYGEN SATURATION: 94 % | TEMPERATURE: 98 F | DIASTOLIC BLOOD PRESSURE: 105 MMHG

## 2017-01-23 LAB
ALBUMIN SERPL BCP-MCNC: 3.1 G/DL
ALP SERPL-CCNC: 76 U/L
ALT SERPL W/O P-5'-P-CCNC: 12 U/L
ANION GAP SERPL CALC-SCNC: 7 MMOL/L
AST SERPL-CCNC: 15 U/L
BASOPHILS # BLD AUTO: 0.01 K/UL
BASOPHILS NFR BLD: 0.2 %
BILIRUB SERPL-MCNC: 0.7 MG/DL
BUN SERPL-MCNC: 8 MG/DL
CALCIUM SERPL-MCNC: 8.8 MG/DL
CHLORIDE SERPL-SCNC: 108 MMOL/L
CO2 SERPL-SCNC: 23 MMOL/L
CREAT SERPL-MCNC: 0.7 MG/DL
DIASTOLIC DYSFUNCTION: YES
DIFFERENTIAL METHOD: ABNORMAL
EOSINOPHIL # BLD AUTO: 0.2 K/UL
EOSINOPHIL NFR BLD: 5 %
ERYTHROCYTE [DISTWIDTH] IN BLOOD BY AUTOMATED COUNT: 15 %
EST. GFR  (AFRICAN AMERICAN): >60 ML/MIN/1.73 M^2
EST. GFR  (NON AFRICAN AMERICAN): >60 ML/MIN/1.73 M^2
ESTIMATED PA SYSTOLIC PRESSURE: 11.41
GLUCOSE SERPL-MCNC: 78 MG/DL
HCT VFR BLD AUTO: 40.5 %
HGB BLD-MCNC: 13.4 G/DL
LYMPHOCYTES # BLD AUTO: 1.8 K/UL
LYMPHOCYTES NFR BLD: 40 %
MCH RBC QN AUTO: 28.2 PG
MCHC RBC AUTO-ENTMCNC: 33.1 %
MCV RBC AUTO: 85 FL
MITRAL VALVE MOBILITY: NORMAL
MONOCYTES # BLD AUTO: 0.5 K/UL
MONOCYTES NFR BLD: 10.7 %
NEUTROPHILS # BLD AUTO: 2 K/UL
NEUTROPHILS NFR BLD: 44.1 %
PLATELET # BLD AUTO: 206 K/UL
PMV BLD AUTO: 11.2 FL
POTASSIUM SERPL-SCNC: 3.6 MMOL/L
PROT SERPL-MCNC: 6.8 G/DL
RBC # BLD AUTO: 4.75 M/UL
RETIRED EF AND QEF - SEE NOTES: 60 (ref 55–65)
SODIUM SERPL-SCNC: 138 MMOL/L
TRICUSPID VALVE REGURGITATION: ABNORMAL
WBC # BLD AUTO: 4.6 K/UL

## 2017-01-23 PROCEDURE — 93306 TTE W/DOPPLER COMPLETE: CPT

## 2017-01-23 PROCEDURE — G0378 HOSPITAL OBSERVATION PER HR: HCPCS

## 2017-01-23 PROCEDURE — 25000003 PHARM REV CODE 250: Performed by: STUDENT IN AN ORGANIZED HEALTH CARE EDUCATION/TRAINING PROGRAM

## 2017-01-23 PROCEDURE — 36415 COLL VENOUS BLD VENIPUNCTURE: CPT

## 2017-01-23 PROCEDURE — 93306 TTE W/DOPPLER COMPLETE: CPT | Mod: 26,,, | Performed by: INTERNAL MEDICINE

## 2017-01-23 PROCEDURE — 94761 N-INVAS EAR/PLS OXIMETRY MLT: CPT

## 2017-01-23 PROCEDURE — 97530 THERAPEUTIC ACTIVITIES: CPT

## 2017-01-23 PROCEDURE — 63600175 PHARM REV CODE 636 W HCPCS: Performed by: STUDENT IN AN ORGANIZED HEALTH CARE EDUCATION/TRAINING PROGRAM

## 2017-01-23 PROCEDURE — 97110 THERAPEUTIC EXERCISES: CPT

## 2017-01-23 PROCEDURE — 80053 COMPREHEN METABOLIC PANEL: CPT

## 2017-01-23 PROCEDURE — 85025 COMPLETE CBC W/AUTO DIFF WBC: CPT

## 2017-01-23 RX ORDER — ATORVASTATIN CALCIUM 20 MG/1
20 TABLET, FILM COATED ORAL DAILY
Status: DISCONTINUED | OUTPATIENT
Start: 2017-01-23 | End: 2017-01-23 | Stop reason: HOSPADM

## 2017-01-23 RX ORDER — ATORVASTATIN CALCIUM 20 MG/1
20 TABLET, FILM COATED ORAL DAILY
Qty: 90 TABLET | Refills: 3 | Status: SHIPPED | OUTPATIENT
Start: 2017-01-23 | End: 2017-01-23

## 2017-01-23 RX ORDER — ATORVASTATIN CALCIUM 20 MG/1
20 TABLET, FILM COATED ORAL DAILY
Qty: 90 TABLET | Refills: 3 | Status: ON HOLD | OUTPATIENT
Start: 2017-01-23 | End: 2018-03-20 | Stop reason: HOSPADM

## 2017-01-23 RX ORDER — LISINOPRIL 5 MG/1
5 TABLET ORAL DAILY
Status: DISCONTINUED | OUTPATIENT
Start: 2017-01-23 | End: 2017-01-23 | Stop reason: HOSPADM

## 2017-01-23 RX ADMIN — LISINOPRIL 5 MG: 5 TABLET ORAL at 09:01

## 2017-01-23 RX ADMIN — ENOXAPARIN SODIUM 40 MG: 100 INJECTION SUBCUTANEOUS at 01:01

## 2017-01-23 RX ADMIN — ATORVASTATIN CALCIUM 20 MG: 20 TABLET, FILM COATED ORAL at 09:01

## 2017-01-23 RX ADMIN — TAMSULOSIN HYDROCHLORIDE 0.4 MG: 0.4 CAPSULE ORAL at 09:01

## 2017-01-23 RX ADMIN — ASPIRIN 81 MG 81 MG: 81 TABLET ORAL at 09:01

## 2017-01-23 NOTE — PLAN OF CARE
TN faxed notes and clinicals to [facility] [phone]. Family in room ready to bring patient home.  Hard-faxes given to family member of by bedside nurse.     01/23/17 1639   Final Note   Discharge Disposition (return to assisted living)   Discharge planning education complete? Yes   What phone number can be called within the next 1-3 days to see how you are doing after discharge? [phone]   Hospital Follow Up  Appt(s) scheduled? Yes   Discharge plans and expectations educations in teach back method with documentation complete? Yes   Offered [facility] -- Bedside Delivery? Yes   Discharge/Hospital Encounter Summary to ([facility]) PCP Yes   Referral to Outpatient Case Management complete? No   Referral to / orders for Home Health Complete? n/a   30 day supply of medicines given at discharge, if documented non-compliance / non-adherence? No   Any social issues identified prior to discharge? No   Did you assess the readiness or willingness of the family or caregiver to support self management of care? Yes   Right Care Referral Info   Post Acute Recommendation No Care   Nehemiah Meadows RN  Transitional Navigator/Case Management  [phone]

## 2017-01-23 NOTE — PROGRESS NOTES
TN rounded, patient goes to [facility] in [place], 5 days a week and uses medicaid transportation. Pt sees Dr. Hayes in [place].  Nehemiah Meadows, RN  Transitional Navigator/Case Management  [phone]

## 2017-01-23 NOTE — PT/OT/SLP PROGRESS
Physical Therapy  Treatment    Goyo Craig   MRN: [id number]   Admitting Diagnosis: Syncope and collapse    PT Received On: 17  PT Start Time: 1330     PT Stop Time: 1410    PT Total Time (min): 40 min       Billable Minutes:  Therapeutic Activity 32 and Therapeutic Exercise 8    Treatment Type: Treatment  PT/PTA: PT             General Precautions: Standard, fall  Orthopedic Precautions: N/A   Braces:      Do you have any cultural, spiritual, Jehovah's witness conflicts, given your current situation?: none    Subjective:  Communicated with nursing prior to session.      Pain Ratin/10              Pain Rating Post-Intervention: 0/10    Objective:   Patient found with: peripheral IV, telemetry    Functional Mobility:  Bed Mobility:   Supine to Sit:  (found sitting at EOB with nurse present)  Sit to Supine: Stand by Assistance    Transfers:  Sit <> Stand Assistance: Contact Guard Assistance  Sit <> Stand Assistive Device: Rolling Walker    Gait:   Gait Distance: 60' with VERY SLOW joão-requiring freq vebal and tactile cues for correct use of RW and upright posture.  pt in stooped posture pushing RW too far in front of himself,  forward and flexed neck looking at ground as well   Assistance 1: Contact Guard Assistance  Gait Assistive Device: Rolling walker  Gait Pattern: reciprocal  Gait Deviation(s): decreased joão, increased time in double stance, decreased velocity of limb motion, decreased step length, decreased swing-to-stance ratio, decreased toe-to-floor clearance    Stairs:  Not assessed    Balance:   Static Sit: FAIR+: Able to take MINIMAL challenges from all directions  Dynamic Sit: FAIR+: Maintains balance through MINIMAL excursions of active trunk motion  Static Stand: FAIR: Maintains without assist but unable to take challenges  Dynamic stand: FAIR: Needs CONTACT GUARD during gait     Therapeutic Activities and Exercises:  Hip flex in sit and standing with HHA.  LAQ and AP in sitting 20 reps      AM-PAC 6 CLICK MOBILITY  How much help from another person does this patient currently need?   1 = Unable, Total/Dependent Assistance  2 = A lot, Maximum/Moderate Assistance  3 = A little, Minimum/Contact Guard/Supervision  4 = None, Modified [place]/Independent    Turning over in bed (including adjusting bedclothes, sheets and blankets)?: 4  Sitting down on and standing up from a chair with arms (e.g., wheelchair, bedside commode, etc.): 3  Moving from lying on back to sitting on the side of the bed?: 3  Moving to and from a bed to a chair (including a wheelchair)?: 3  Need to walk in hospital room?: 3  Climbing 3-5 steps with a railing?: 2  Total Score: 18    AM-PAC Raw Score CMS G-Code Modifier Level of Impairment Assistance   6 % Total / Unable   7 - 9 CM 80 - 100% Maximal Assist   10 - 14 CL 60 - 80% Moderate Assist   15 - 19 CK 40 - 60% Moderate Assist   20 - 22 CJ 20 - 40% Minimal Assist   23 CI 1-20% SBA / CGA   24 CH 0% Independent/ Mod I     Patient left HOB elevated with all lines intact, call button in reach, bed alarm on and nurse notified.    Assessment:  Goyo Craig is a 71 y.o. male with a medical diagnosis of Syncope and collapse and presents with Rehab identified problem list/impairments: Rehab identified problem list/impairments: weakness, impaired endurance, impaired balance, decreased safety awareness, impaired self care skills, impaired functional mobilty, gait instability, pain.  Pt is progressing toward goals.  Needs mod cueing for correct technique with RW    Rehab potential is good.    Activity tolerance: Fair    Discharge recommendations: Discharge Facility/Level Of Care Needs: assisted living facility, outpatient PT     Barriers to discharge: Barriers to Discharge: Decreased caregiver support    Equipment recommendations: Equipment Needed After Discharge: none     GOALS:   Physical Therapy Goals        Problem: Physical Therapy Goal    Goal Priority Disciplines  Outcome Goal Variances Interventions   Physical Therapy Goal     PT/OT, PT Ongoing (interventions implemented as appropriate)     Description:  Goals to be met by: 17       Patient will increase functional independence with mobility by performin. Supine to sit with Modified [place]  2. Sit to supine with Modified [place]  3. Sit to stand transfer with Modified [place] and Supervision  4. Gait  x 30 feet with Modified [place] and Supervision using Rolling Walker.   5. Lower extremity exercise program x15 reps     Rec return to assisted living and continue OP PT.              PLAN:    Patient to be seen 6 x/week  to address the above listed problems via gait training, therapeutic activities, therapeutic exercises  Plan of Care expires: 17  Plan of Care reviewed with: patient         Radha MUSTAFA Billy, PT  2017
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
## 2017-01-23 NOTE — NURSING
IV site removed and was intact. No active bleeding noted. Tele monitor removed and returned to the nurses station. Discharge instructions, rxs and educational printouts given to pt and discussed thoroughly. Patient verbalized clear understanding of all discussed. Patient d/c'd via w/c with escort service and family with all of patient's belongings. At present no distress noted.

## 2017-01-23 NOTE — PLAN OF CARE
Problem: Patient Care Overview  Goal: Plan of Care Review  Outcome: Ongoing (interventions implemented as appropriate)  NO acute distress noted. Tele monitor continue. Patient resting in bed comfortably throughout the night. Review the plan of care with patient. Patient verbalized understanding the plan of care. Fall precautions maintained. Bed alarm set, bed in low and locked position, non skid socks applied, side rails up x2, call light within reach. Continue to monitor

## 2017-01-23 NOTE — PLAN OF CARE
Problem: Physical Therapy Goal  Goal: Physical Therapy Goal  Goals to be met by: 17      Patient will increase functional independence with mobility by performin. Supine to sit with Modified [place]  2. Sit to supine with Modified [place]  3. Sit to stand transfer with Modified [place] and Supervision  4. Gait x 30 feet with Modified [place] and Supervision using Rolling Walker.   5. Lower extremity exercise program x15 reps     Rec return to assisted living and continue OP PT.   Outcome: Ongoing (interventions implemented as appropriate)  Pt amb with RW requiring mod cueing for correct,safe technique.  Progressing toward goals.  REC;  Return to AL and OP PT

## 2017-01-23 NOTE — PROGRESS NOTES
[facility] Internal Medicine Resident DANIEL Progress Note    Subjective:      Patient states he feels well this AM. Denies fever, chills, chest pain, abdominal pain.     Objective:   Last 24 Hour Vital Signs:  BP  Min: 151/91  Max: 165/99  Temp  Av.2 °F (36.8 °C)  Min: 97.3 °F (36.3 °C)  Max: 98.7 °F (37.1 °C)  Pulse  Av.1  Min: 63  Max: 77  Resp  Av  Min: 18  Max: 18  SpO2  Av.3 %  Min: 93 %  Max: 99 %  I/O last 3 completed shifts:  In: 1818 [P.O.:650; I.V.:1168]  Out: 1325 [Urine:1325]    Physical Examination:    Gen:   NAD  HENT:  NCAT, EOMI, MMM  CV:   RRR, no murmurs auscultated  Pulm:   CTAB  Abd:   BS+, nontender, nondistended. Well healed midline scar  Extremities:  2+ radial and dorsalis pedis pulses. No cyanosis, clubbing, or edema.  Skin:   no rash or lesion.  Neuro:  moving all extremities well, 5/5 strength to shoulder shrug, elbow flexion and extension, foot dorsiflexion and plantarflexion, and hip flexion bilaterally.  Psych:  AAOx3, calm, cooperative, converstional     Laboratory:  Laboratory Data  Pertinent Findings:  Recent Labs      17   1620  17   0553  17   0514   WBC  8.07  4.92  4.60   HGB  13.8*  12.9*  13.4*   HCT  42.5  39.3*  40.5   PLT  187  170  206   MCV  88  85  85   RDW  15.2*  15.3*  15.0*   GRAN  73.0  5.9  48.5  2.4  44.1  2.0   LYMPH  18.8  1.5  40.0  2.0  40.0  1.8   MONO  6.9  0.6  8.9  0.4  10.7  0.5   EOS  0.1  0.1  0.2   BASO  0.01  0.01  0.01   EOSINOPHIL  1.1  2.4  5.0   BASOPHIL  0.1  0.2  0.2       Recent Labs      17   1531  17   0553  17   0514   NA  140  141  138   K  4.8  3.7  3.6   CL  112*  111*  108   CO2  16*  23  23   BUN  13  13  8   CREATININE  0.8  0.8  0.7     No results for input(s): POCTGLUCOSE in the last 72 hours.  Recent Labs      17   1531  17   0553  17   0514   PROT  6.7  6.5  6.8   ALBUMIN  3.2*  3.0*  3.1*   BILITOT  0.5  0.5  0.7   AST  18  12  15   ALT  13  10  12   ALKPHOS   77  70  76       Microbiology Data  Pertinent Findings:  No new data    Other Results:  EKG (my interpretation): 1/22/17 EKG: Normal Sinus    Radiology Data   Pertinent Findings (my interpretation):  No new imaging    Current Medications:     Infusions:   sodium chloride 0.9% 100 mL/hr at 01/22/17 2311        Scheduled:   aspirin  81 mg Oral Daily    enoxaparin  40 mg Subcutaneous Daily    tamsulosin  0.4 mg Oral Daily        PRN:      Antibiotics and Day Number of Therapy:  None    Lines and Day Number of Therapy:  PIV x1 20G    Assessment:     Goyo Craig is a 71 y.o.male with  Patient Active Problem List    Diagnosis Date Noted    Bradycardia     Syncope and collapse 01/21/2017    Gait difficulty 01/19/2017    Muscle weakness of lower extremity 01/19/2017    Muscle weakness 06/10/2016    Decreased range of motion of lumbar spine 06/10/2016    Difficulty walking 06/10/2016    Pain aggravated by activities of daily living 06/10/2016    Syncope 03/22/2015    Weakness of left upper extremity 03/05/2015    Right leg weakness 03/05/2015    Urinary frequency 03/05/2015    CAD S/P percutaneous coronary angioplasty 03/04/2015    Old lacunar stroke without late effect 03/04/2015    Benign essential hypertension 03/04/2015    Pre-syncope 03/04/2015        Plan:     Syncopal episode  - Likely secondary to cardiac arrhythmia vs orthostasis, hx of MI, syncope from seated position  - 1/21/17 CT head: No acute hemorrhage, chronic microvascular ischemic change seen on prior CT  - On admission, electrolytes wnl  - F/u echo  - F/u Cardiology recommendations  - Admit to telemetry, monitor for arrythmia    History of MI with stent  - 1/22/17 Lipid Panel: TChol 125, LDL 60  - holding ace inhibitor for orthostatic hypotension  - troponins negative trended overnight.  - continue home ASA, start on moderate-intensity statin due to hx of MI (atorvastatin 20mg daily)    Normocytic Anemia  - On admission, H/H  13.8/42.5 MCV 88  - ferritin elevated, HAILE unlikely. B12 and Folate WNL  - Will continue to monitor     HTN  - In ED patient was orthostatic,  - Continued IVF resusitation  - Initially held lisinopril due to being orthostatic  - Patient currently not orthostatic, will resume home lisinopril    BPH  - No acute issues  - Continue flomax      Tobacco use  - smokes several cigarettes/week  - counseled on cessation     Diet: cardiac  Prophylaxis: lovenox  Social: Lives at [facility]. Return after discharge    Dispo: pending echo, Cardiology work-up    Dharmesh Waldrop  [facility] Internal Medicine HO-I  [facility] Internal Medicine Service Team B    [facility] Medicine Hospitalist Pager numbers:   [facility] Hospitalist Medicine Team A (Jayro/Chriss): [phone]  [facility] Hospitalist Medicine Team B (Raine/Jay):  [phone]

## 2017-01-24 ENCOUNTER — CLINICAL SUPPORT (OUTPATIENT)
Dept: REHABILITATION | Facility: HOSPITAL | Age: 72
End: 2017-01-24
Attending: INTERNAL MEDICINE
Payer: MEDICARE

## 2017-01-24 DIAGNOSIS — R26.9 GAIT DIFFICULTY: Primary | ICD-10-CM

## 2017-01-24 DIAGNOSIS — M62.81 MUSCLE WEAKNESS OF LOWER EXTREMITY: ICD-10-CM

## 2017-01-24 PROCEDURE — 97110 THERAPEUTIC EXERCISES: CPT

## 2017-01-24 NOTE — PROGRESS NOTES
"TIME RECORD    Date:  01/24/2017    Start Time:  10:00  Stop Time:  11:00    PROCEDURES:    TIMED  Procedure Min.   TE 60                     UNTIMED  Procedure Min.             Total Timed Minutes:  60  Total Timed Units:  4  Total Untimed Units:  0  Charges Billed/# of units:  4 (TE-4)      Progress/Current Status    Subjective:     Patient ID: Goyo Craig is a 71 y.o. male.  Diagnosis:   1. Gait difficulty     2. Muscle weakness of lower extremity       Pain: 9 /10  Patient reports having pain in both of his legs.  Patient reports he has not been doing any exercises since he stopped therapy, does not feel like doing them.    Objective:     Patient was educated and performed therapeutic exercises as per log below 1:1 with PTA x 60 minutes to improve ROM, flexibility, strength, balance and gait.  Patient declined cold pack at the end of therapy.     Date  01/24/17   VISIT 2/25   G CODE VISIT 2/10   POC EXP. DATE 03/02/17   VISIT AMOUNT    MEDICARE TOTAL 127.92    267.92   FACE-TO-FACE 02/18/17       TABLE:    Glut sets 10 x 3"   Quad sets 15 x 3"   GSS w/ strap long sit 5 x 10"   Bridge  1 x 10   LTR 1 x 10   Marching  1 x 10   SLR 1 x 10   Hip abduction 1 x 15 YTB   Hip adduction 10 x 3" w/ ball   SAQ 1 x 10   Heel Slides --   SEATED:    LAQs 1 x 10   HS curls --   Seated Hip Flex 1 x 10   STANDING:    Hip Abd --   Hip Flex --   Hip Ext --   HS Curls --   Step Ups --       CP declined       Initials GWA 1/6       Assessment:     Patient requires constant supervision to perform his exercises correctly and to work through available ROM.    Patient Education/Response:     Patient was issued a written copy of today's exercises for her HEP and instructed to perform twice a day.  Patient and his brother verbalized understanding instructions.     Plans and Goals:     Continue with Plan Of Care and progress toward PT goals.    Short Term Goals: 3 weeks  MORGAN Balance score= 36 or greater to improve balance with " standing and walking  Increase strength 1/2 muscle grade bilateral LE's  Be able to perform HEP with minimal cueing required  Improve functional impairment of mobility to 50%      Long Term Goals: 6 weeks  IBerg Balance Score =40 or greater for improved balance with standing and walking and to decrease fall risk  Improve muscle strength 1 muscle grade  Improve muscle strength with MMT to 4/5 to 4+/5  Restore ability to ambulate with improved gait pattern using rollator in house and community  Restore ability to stand for ADL with improved balance  Restore ability to perform all transfers safely and independently  Restore ability to perform ADL's and household activities independently with improved balance  Improve functional impairment of mobility to 40%

## 2017-01-24 NOTE — DISCHARGE SUMMARY
[facility] Discharge Summary    Primary Team: [facility] IM Team B  Attending Physician: Jay  Resident: Nate  Intern: Katlin    Date of Admit: 1/21/2017  Date of Discharge: 1/23/2017    Discharge to: [facility]  Condition: stable    Discharge Diagnoses     Patient Active Problem List   Diagnosis    CAD S/P percutaneous coronary angioplasty    Old lacunar stroke without late effect    Benign essential hypertension    Pre-syncope    Weakness of left upper extremity    Right leg weakness    Urinary frequency    Syncope    Muscle weakness    Decreased range of motion of lumbar spine    Difficulty walking    Pain aggravated by activities of daily living    Gait difficulty    Muscle weakness of lower extremity    Syncope and collapse    Bradycardia       Consultants and Procedures     Consultants:  cardiology    Procedures:   Echo showing normal EF and diastolic dysfunction  MRI brain with no acute changes.    Brief History of Present Illness      Goyo Craig is a 71 y.o. male who  has a past medical history of Acute anterior myocardial infarction (7/10/2007); CVA (cerebral infarction); Hypertension; Osteoarthritis; and Stroke.  The patient presented to [facility] on 1/21/2017 with a primary complaint of Loss of Consciousness (pt arrived per [facility] c/o syncopal episode after found unresponsive in chair outside)    Patient presented with syncopal episode from seated position and EKG showed sinus bradycardia.  He was admitted for syncope workup.  Cardiac monitoring was unrevealing, Echo showing only diastolic dysfunction, MRI brain unremarkable.  Cardiology did not recommend any current intervention and patient was discharged with cardiology follow-up for holter monitor.    For the full HPI please refer to the History & Physical from this admission.    Hospital Course By Problem with Pertinent Findings     Syncopal episode  - Likely secondary to cardiac arrhythmia  vs orthostasis, hx of MI, syncope from seated position  - 1/21/17 CT head: No acute hemorrhage, chronic microvascular ischemic change seen on prior CT  - On admission, electrolytes wnl  - Echo nl EF with diastolic dysfunction  - Appreciate Cardiology recommendations, no procedural intervention at this time  - Discharged with cardiology follow-up for holter monitor     History of MI with stent  - 1/22/17 Lipid Panel: TChol 125, LDL 60  - holding ace inhibitor for orthostatic hypotension  - troponins negative trended overnight.  - continue home ASA, start on moderate-intensity statin due to hx of MI (atorvastatin 20mg daily)  - discharged on ASA and statin.  No BB at this time due to low normal heart rate.     Normocytic Anemia  - On admission, H/H 13.8/42.5 MCV 88  - ferritin elevated, HAILE unlikely. B12 and Folate WNL  - recommend repeat as outpatient.      HTN  - In ED patient was orthostatic,  - Continued IVF resusitation  - Initially held lisinopril due to being orthostatic  - Patient currently not orthostatic, will resume home lisinopril  - no acute issues this admission, home meds continued.    BPH  - No acute issues  - Continue flomax  - no acute issues this admission, home meds continued.      Tobacco use  - smokes several cigarettes/week  - counseled on cessation    Discharge Medications        Medication List      START taking these medications          atorvastatin 20 MG tablet   Commonly known as:  LIPITOR   Take 1 tablet (20 mg total) by mouth once daily.         CONTINUE taking these medications          aspirin 81 MG Chew   Take 1 tablet (81 mg total) by mouth once daily.       baclofen 10 MG tablet   Commonly known as:  LIORESAL       cilostazol 100 MG Tab   Commonly known as:  PLETAL       lisinopril 5 MG tablet   Commonly known as:  PRINIVIL,ZESTRIL       tamsulosin 0.4 mg Cp24   Commonly known as:  FLOMAX   Take 1 capsule (0.4 mg total) by mouth once daily.            Where to Get Your Medications       You can get these medications from any pharmacy     Bring a paper prescription for each of these medications     atorvastatin 20 MG tablet             Discharge Information:   Diet:  cardiac    Physical Activity:  As tolerated    Instructions:  1. Take all medications as prescribed  2. Keep all follow-up appointments  3. Return to the hospital or call your primary care physicians if any worsening symptoms such as fever, chills, chest pain, shortness of breath, passing out, lightheadedness, or any other symptoms.    Follow-Up Appointments:  PCP Dr. Hayes in 1 week  Cardiology Dr. Rios 2/7    Vito Dalal MD HO-1  [facility] Internal Medicine Team B

## 2017-01-31 ENCOUNTER — CLINICAL SUPPORT (OUTPATIENT)
Dept: REHABILITATION | Facility: HOSPITAL | Age: 72
End: 2017-01-31
Attending: INTERNAL MEDICINE
Payer: MEDICARE

## 2017-01-31 DIAGNOSIS — M62.81 MUSCLE WEAKNESS OF LOWER EXTREMITY: ICD-10-CM

## 2017-01-31 DIAGNOSIS — R26.9 GAIT DIFFICULTY: Primary | ICD-10-CM

## 2017-01-31 PROCEDURE — 97110 THERAPEUTIC EXERCISES: CPT

## 2017-01-31 NOTE — PROGRESS NOTES
"TIME RECORD    Date:  01/31/2017    Start Time:  10:12  Stop Time:  11:05    PROCEDURES:    TIMED  Procedure Min.   TE 53                     UNTIMED  Procedure Min.             Total Timed Minutes:  53  Total Timed Units:  4  Total Untimed Units:  0  Charges Billed/# of units:  4 (TE-4)      Progress/Current Status    Subjective:     Patient ID: Goyo Craig is a 71 y.o. male.  Diagnosis:   1. Gait difficulty     2. Muscle weakness of lower extremity       Pain: 8 /10  Patient reports he continues with a lot of pain in his legs and shoulders.  Patient reports he has not felt like doing his HEP, too tired.     Objective:     Patient arrived to his scheduled appointment 12 minutes late.  Patient ambulated from waiting room to gym with a wheelie walker and a slow shuffled gait.  Patient was educated and performed therapeutic exercises as per log below, along with today's progressions, 1:1 with PTA x 53 minutes to improve ROM, flexibility, strength, balance and gait.  Patient declined cold pack at the end of therapy.     Date  01/31/17 01/24/17   VISIT 3/25 2/25   G CODE VISIT 3/10 2/10   POC EXP. DATE 03/02/17 03/02/17   VISIT AMOUNT    MEDICARE TOTAL 127.92    395.84 127.92    267.92   FACE-TO-FACE 02/18/17 02/18/17        TABLE:     Glut sets 10 x 3" 10 x 3"   Quad sets 15 x 3" 15 x 3"   GSS w/ strap long sit 5 x 10" 5 x 10"   Bridge  1 x 15 1 x 10   LTR  1 x 15 1 x 10   Marching  1 x 10 1 x 10   SLR 3 x 5 1 x 10   Hip abduction 1 x 15 RTB 1 x 15 YTB   Hip adduction 15 x 3" w/ ball 10 x 3" w/ ball   SAQ 1 x 15 1 x 10   Heel Slides -- --   SEATED:     LAQs 1 x 10 1 x 10   HS curls -- --   Seated Hip Flex 1 x 10 1 x 10   STANDING:     Hip Abd -- --   Hip Flex -- --   Hip Ext -- --   HS Curls -- --   Step Ups -- --        CP declined declined        Initials GWA 2/6 GWA 1/6       Assessment:     Patient continues to require constant supervision to perform his exercises correctly and to work through available ROM.  " While ambulating from waiting area to clinic and back, patient was cued to pick his feet up and to take longer steps.    Patient Education/Response:     Patient was instructed to continue with his HEP twice a day.  Patient and his brother verbalized understanding instructions.     Plans and Goals:     Continue with Plan Of Care and progress toward PT goals.    Short Term Goals: 3 weeks  MORGAN Balance score= 36 or greater to improve balance with standing and walking  Increase strength 1/2 muscle grade bilateral LE's  Be able to perform HEP with minimal cueing required  Improve functional impairment of mobility to 50%      Long Term Goals: 6 weeks  IBerg Balance Score =40 or greater for improved balance with standing and walking and to decrease fall risk  Improve muscle strength 1 muscle grade  Improve muscle strength with MMT to 4/5 to 4+/5  Restore ability to ambulate with improved gait pattern using rollator in house and community  Restore ability to stand for ADL with improved balance  Restore ability to perform all transfers safely and independently  Restore ability to perform ADL's and household activities independently with improved balance  Improve functional impairment of mobility to 40%

## 2017-02-02 ENCOUNTER — CLINICAL SUPPORT (OUTPATIENT)
Dept: REHABILITATION | Facility: HOSPITAL | Age: 72
End: 2017-02-02
Attending: INTERNAL MEDICINE
Payer: MEDICARE

## 2017-02-02 DIAGNOSIS — R26.9 GAIT DIFFICULTY: Primary | ICD-10-CM

## 2017-02-02 DIAGNOSIS — M62.81 MUSCLE WEAKNESS OF LOWER EXTREMITY: ICD-10-CM

## 2017-02-02 PROCEDURE — 97110 THERAPEUTIC EXERCISES: CPT | Performed by: PHYSICAL MEDICINE & REHABILITATION

## 2017-02-02 NOTE — PROGRESS NOTES
"Name: Goyo Kong Whipple  Clinic Number: [id number]  Diagnosis:   Encounter Diagnoses   Name Primary?    Muscle weakness of lower extremity     Gait difficulty Yes     Physician: Chrissy Hayes MD  Treatment Orders: PT Eval and Treat  Past Medical History   Diagnosis Date    Acute anterior myocardial infarction 7/10/2007     s/p PTCA    CVA (cerebral infarction)      no deficits    Hypertension     Osteoarthritis     Stroke        Precautions: CVA  Visit # authorized:  on 2017  Authorization period: 17  Plan of care expiration: 3/2/17  Start time: 10:30 AM  Stop Time:11:15 AM       Procedures: TE      Timed:  Procedure: TE x3  Minutes: 45      Untimed:  Procedure:0  Minutes:      Total Timed Minutes:45  Total Timed Units:3  Total Untimed Units:0  Charged Billed # of Units:3    Subjective     Pt reports: performing HEP a little at home. States tends to have pain in his back and legs.    Pain Scale: before treatment: 4/10 currently; after treatment: 4/10    Objective       TREATMENT  Therapeutic exercise: Goyo Kong received therapeutic exercises to develop ROM, flexibility and strength for 45 minutes includin:1 with PT    Date  17   VISIT 4/25 3/25 2/25   G CODE VISIT 4/10 3/10 2/10   POC EXP. DATE 3/2/17 03/02/17 03/02/17   VISIT AMOUNT     MEDICARE TOTAL 96.00    491.84 127.92     395.84 127.92     267.92   FACE-TO-FACE 17            TABLE:        Glut sets 10 x 5" 10 x 3" 10 x 3"   Quad sets 15 x 5" 15 x 3" 15 x 3"   GSS w/ strap long sit 5 x 10" 5 x 10" 5 x 10"   Bridge  1 x 15 1 x 15 1 x 10   LTR 1 x 15 1 x 15 1 x 10   Marching  1 x 10 1 x 10 1 x 10   SLR 1 x 10 3 x 5 1 x 10   Hip abduction 1 x 15 RTB 1 x 15 RTB 1 x 15 YTB   Hip adduction 1 x 15 w/ball 15 x 3" w/ ball 10 x 3" w/ ball   SAQ 1 x 15 1 x 15 1 x 10   Heel Slides 1 x 10 -- --   SEATED:        LAQs 1 x10 1 x 10 1 x 10   HS curls  -- --   Seated Hip Flex 1 x10 1 x 10 1 x 10   STANDING:  "       Hip Abd  -- --   Hip Flex  -- --   Hip Ext  -- --   HS Curls  -- --   Step Ups  -- --            CP  declined declined            Initials VK GWA 2/6 GWA 1/6        Written Home Exercises Provided: Continue with current hEP  Pt demo good understanding of the education provided. Goyo Kong demonstrated good return demonstration of activities.     Pt. education:  · Posture reeducation, body mechanics, HEP,   · No spiritual or educational barriers to learning provided  · Pt has no cultural, educational or language barriers to learning provided.    Assessment     Patient requires constant supervision and verbal cueing with all exercises to perform properly. Patient complains continuously throughout treatment session; but did perform all exercises. Patient ambulates using rollator with shuffling gait. Patient instructed to continue with HEP daily.  Pt will continue to benefit from skilled outpatient physical therapy to address the remaining functional deficits, provide pt/family education, and to maximize pt's level of independence in the home and community environment. .     Goals:   Short Term Goals: 3 weeks  MORGAN Balance score= 36 or greater to improve balance with standing and walking  Increase strength 1/2 muscle grade bilateral LE's  Be able to perform HEP with minimal cueing required  Improve functional impairment of mobility to 50%      Long Term Goals: 6 weeks  IBerg Balance Score =40 or greater for improved balance with standing and walking and to decrease fall risk  Improve muscle strength 1 muscle grade  Improve muscle strength with MMT to 4/5 to 4+/5  Restore ability to ambulate with improved gait pattern using rollator in house and community  Restore ability to stand for ADL with improved balance  Restore ability to perform all transfers safely and independently  Restore ability to perform ADL's and household activities independently with improved balance  Improve functional impairment of mobility to  40%    Anticipated barriers to physical therapy: Patient's motivation to comply with PT and HEP  Pt's spiritual, cultural and educational needs considered and pt agreeable to plan of care and goals        Plan   Continue with established Plan of Care towards PT goals.              Marlyn Goodman, PT

## 2017-02-07 ENCOUNTER — OFFICE VISIT (OUTPATIENT)
Dept: CARDIOLOGY | Facility: CLINIC | Age: 72
End: 2017-02-07
Payer: MEDICARE

## 2017-02-07 VITALS — HEART RATE: 75 BPM | WEIGHT: 178.5 LBS | OXYGEN SATURATION: 98 % | HEIGHT: 67 IN | BODY MASS INDEX: 28.02 KG/M2

## 2017-02-07 DIAGNOSIS — I10 BENIGN ESSENTIAL HYPERTENSION: Chronic | ICD-10-CM

## 2017-02-07 DIAGNOSIS — R55 VASOVAGAL SYNCOPE: Primary | Chronic | ICD-10-CM

## 2017-02-07 DIAGNOSIS — I25.10 CORONARY ARTERY DISEASE INVOLVING NATIVE CORONARY ARTERY OF NATIVE HEART WITHOUT ANGINA PECTORIS: Chronic | ICD-10-CM

## 2017-02-07 PROCEDURE — 99213 OFFICE O/P EST LOW 20 MIN: CPT | Mod: PBBFAC,PO | Performed by: INTERNAL MEDICINE

## 2017-02-07 PROCEDURE — 99202 OFFICE O/P NEW SF 15 MIN: CPT | Mod: S$PBB,,, | Performed by: INTERNAL MEDICINE

## 2017-02-07 PROCEDURE — 99999 PR PBB SHADOW E&M-EST. PATIENT-LVL III: CPT | Mod: PBBFAC,,, | Performed by: INTERNAL MEDICINE
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
## 2017-02-08 ENCOUNTER — CLINICAL SUPPORT (OUTPATIENT)
Dept: REHABILITATION | Facility: HOSPITAL | Age: 72
End: 2017-02-08
Attending: INTERNAL MEDICINE
Payer: MEDICARE

## 2017-02-08 DIAGNOSIS — R26.9 GAIT DIFFICULTY: Primary | ICD-10-CM

## 2017-02-08 DIAGNOSIS — M62.81 MUSCLE WEAKNESS OF LOWER EXTREMITY: ICD-10-CM

## 2017-02-08 PROCEDURE — 97110 THERAPEUTIC EXERCISES: CPT

## 2017-02-08 NOTE — PROGRESS NOTES
"Name: Goyo Kong [place]  Clinic Number: [id number]  Diagnosis:   Encounter Diagnoses   Name Primary?    Muscle weakness of lower extremity     Gait difficulty Yes     Physician: Chrissy Hayes MD  Treatment Orders: PT Eval and Treat  Past Medical History   Diagnosis Date    Acute anterior myocardial infarction 7/10/2007     s/p PTCA    CVA (cerebral infarction)      no deficits    Hypertension     Osteoarthritis     Stroke        Precautions: CVA  Visit # authorized: 5/25 on 2/08/2017  Authorization period: 12/31/17  Plan of care expiration: 3/2/17  Start time: 8:11 AM  Stop Time:9:00 AM       Procedures: TE      Timed:  Procedure: TE - 3  Minutes: 49      Untimed:  Procedure:  Minutes:      Total Timed Minutes: 49  Total Timed Units: 3  Total Untimed Units: 0  Charged Billed # of Units: 3    Subjective     Pt reports:no new complaints.    Pain Scale: before treatment: 9/10 currently; after treatment: 9/10    Objective       TREATMENT  Therapeutic exercise: Goyo Kong arrived to his scheduled appointment 11 minutes late, then received therapeutic exercises as per log below 1:1 with PTA x 49 minutes to develop ROM, flexibility and strength including:      Date  02/08/17 2/2/17 01/31/17 01/24/17   VISIT 5/25 4/25 3/25 2/25   G CODE VISIT 5/10 4/10 3/10 2/10   POC EXP. DATE 03/02/17 3/2/17 03/02/17 03/02/17   VISIT AMOUNT     MEDICARE TOTAL 95.94    587.78 96.00    491.84 127.92     395.84 127.92     267.92   FACE-TO-FACE 02/18/17 2/18/17 02/18/17 02/18/17             TABLE:         Glut sets 10 x 5" 10 x 5" 10 x 3" 10 x 3"   Quad sets 15 x 5" 15 x 5" 15 x 3" 15 x 3"   GSS w/ strap long sit 5 x 10" 5 x 10" 5 x 10" 5 x 10"   Bridge  1 x 15 1 x 15 1 x 15 1 x 10   LTR 1 x 20 1 x 15 1 x 15 1 x 10   Marching  1 x 15 1 x 10 1 x 10 1 x 10   SLR 1 x 15 1 x 10 3 x 5 1 x 10   Hip abduction 1 x 20 RTB 1 x 15 RTB 1 x 15 RTB 1 x 15 YTB   Hip adduction 15 x 3" w/ ball 1 x 15 w/ball 15 x 3" w/ ball 10 x 3" w/ ball   SAQ 1 x 20 1 " x 15 1 x 15 1 x 10   Heel Slides 1 x 10 1 x 10 -- --   SEATED:         LAQs 1 x 10 1 x10 1 x 10 1 x 10   HS curls --  -- --   Seated Hip Flex 1 x 10 1 x10 1 x 10 1 x 10   STANDING:         Hip Abd --  -- --   Hip Flex --  -- --   Hip Ext --  -- --   HS Curls --  -- --   Step Ups --  -- --             CP declined  declined declined             Initials GWA 1/6 VK GWA 2/6 GWA 1/6      Functional limitation reporting disability percentage was obtained through use of FOTO lower leg Survey indicating 70% disability     Written Home Exercises Provided: Continue with current hEP  Pt demo good understanding of the education provided. Goyo Kong demonstrated good return demonstration of activities.     Pt. education:  · Posture reeducation, body mechanics, HEP,   · No spiritual or educational barriers to learning provided  · Pt has no cultural, educational or language barriers to learning provided.    Assessment     Patient continues to require constant supervision and verbal cueing with all exercises to perform properly.  Patient complains continuously throughout treatment session; but did perform all exercises. Patient ambulates using rollator with shuffling gait pattern and stopping several times between waiting room to clinic and back. Patient instructed to continue with HEP daily.  Pt will continue to benefit from skilled outpatient physical therapy to address the remaining functional deficits, provide pt/family education, and to maximize pt's level of independence in the home and community environment. .     Goals:   Short Term Goals: 3 weeks  MORGAN Balance score= 36 or greater to improve balance with standing and walking  Increase strength 1/2 muscle grade bilateral LE's  Be able to perform HEP with minimal cueing required  Improve functional impairment of mobility to 50%      Long Term Goals: 6 weeks  IBerg Balance Score =40 or greater for improved balance with standing and walking and to decrease fall risk  Improve  muscle strength 1 muscle grade  Improve muscle strength with MMT to 4/5 to 4+/5  Restore ability to ambulate with improved gait pattern using rollator in house and community  Restore ability to stand for ADL with improved balance  Restore ability to perform all transfers safely and independently  Restore ability to perform ADL's and household activities independently with improved balance  Improve functional impairment of mobility to 40%    Anticipated barriers to physical therapy: Patient's motivation to comply with PT and HEP  Pt's spiritual, cultural and educational needs considered and pt agreeable to plan of care and goals        Plan   Continue with established Plan of Care towards PT goals.

## 2017-02-10 ENCOUNTER — CLINICAL SUPPORT (OUTPATIENT)
Dept: REHABILITATION | Facility: HOSPITAL | Age: 72
End: 2017-02-10
Attending: INTERNAL MEDICINE
Payer: MEDICARE

## 2017-02-10 DIAGNOSIS — M62.81 MUSCLE WEAKNESS OF LOWER EXTREMITY: ICD-10-CM

## 2017-02-10 DIAGNOSIS — R26.9 GAIT DIFFICULTY: Primary | ICD-10-CM

## 2017-02-10 PROCEDURE — 97110 THERAPEUTIC EXERCISES: CPT

## 2017-02-10 NOTE — PROGRESS NOTES
"Name: Goyo Kong [place]  Clinic Number: [id number]  Diagnosis:   Encounter Diagnoses   Name Primary?    Muscle weakness of lower extremity     Gait difficulty Yes     Physician: Chrissy Hayes MD  Treatment Orders: PT Eval and Treat  Past Medical History   Diagnosis Date    Acute anterior myocardial infarction 7/10/2007     s/p PTCA    CVA (cerebral infarction)      no deficits    Hypertension     Osteoarthritis     Stroke        Precautions: CVA  Visit # authorized: 6/25 on 2/08/2017  Authorization period: 12/31/17  Plan of care expiration: 3/2/17  Start time: 8:00 AM  Stop Time: 9:00 AM       Procedures: TE      Timed:  Procedure: TE - 2  Minutes: 30      Untimed:  Procedure:  Minutes:      Total Timed Minutes: 30  Total Timed Units: 2  Total Untimed Units: 0  Charged Billed # of Units: 2    Subjective     Pt reports: he continues with pain all over.    Pain Scale: before treatment: 9/10 currently; after treatment: 9/10    Objective       TREATMENT  Therapeutic exercise: Goyo Kong received therapeutic exercises as per log below, along with today's progressions, 1:1 with PTA x 30 minutes and supervised exercises by PTA x 30 minutes to develop ROM, flexibility and strength including:      Date  02/10/17 02/08/17 2/2/17 01/31/17 01/24/17   VISIT 6/25 5/25 4/25 3/25 2/25   G CODE VISIT 6/10 5/10 4/10 3/10 2/10   POC EXP. DATE 03/02/17 03/02/17 3/2/17 03/02/17 03/02/17   VISIT AMOUNT     MEDICARE TOTAL 63.96    651.74 95.94    587.78 96.00    491.84 127.92     395.84 127.92     267.92   FACE-TO-FACE 02/18/17 02/18/17 2/18/17 02/18/17 02/18/17              TABLE:          Glut sets 10 x 5" 10 x 5" 10 x 5" 10 x 3" 10 x 3"   Quad sets 15 x 5" 15 x 5" 15 x 5" 15 x 3" 15 x 3"   GSS w/ strap long sit 5 x 10" 5 x 10" 5 x 10" 5 x 10" 5 x 10"   Bridge  1 x 20 1 x 15 1 x 15 1 x 15 1 x 10   LTR 1 x 20 1 x 20 1 x 15 1 x 15 1 x 10   Marching  1 x 15 1 x 15 1 x 10 1 x 10 1 x 10   SLR 1 x 15 1 x 15 1 x 10 3 x 5 1 x 10   Hip " "abduction 1 x 20 RTB 1 x 20 RTB 1 x 15 RTB 1 x 15 RTB 1 x 15 YTB   Hip adduction 15 x 3" w/ ball 15 x 3" w/ ball 1 x 15 w/ball 15 x 3" w/ ball 10 x 3" w/ ball   SAQ 1 x 20 1 x 20 1 x 15 1 x 15 1 x 10   Heel Slides 1 x 10 1 x 10 1 x 10 -- --   SEATED:          LAQs 1 x 10 1 x 10 1 x10 1 x 10 1 x 10   HS curls -- --  -- --   Seated Hip Flex 1 x 10 1 x 10 1 x10 1 x 10 1 x 10   STANDING:          Hip Abd -- --  -- --   Hip Flex -- --  -- --   Hip Ext -- --  -- --   HS Curls -- --  -- --   Step Ups -- --  -- --     --         CP declined declined  declined declined              Initials GWA 2/6 GWA 1/6 VK GWA 2/6 GWA 1/6        Written Home Exercises Provided: Continue with current hEP  Pt demo good understanding of the education provided. Goyo Kong demonstrated good return demonstration of activities.     Pt. education:  · Posture reeducation, body mechanics, HEP,   · No spiritual or educational barriers to learning provided  · Pt has no cultural, educational or language barriers to learning provided.    Assessment     Patient continues to require constant supervision and verbal cueing with all exercises to perform properly and to work through full available ROM.  Patient complains continuously throughout treatment session; but did perform all exercises. Patient ambulates using rollator with shuffling gait pattern and stopping several times between waiting room to clinic and back. Patient instructed to continue with HEP daily.  Pt will continue to benefit from skilled outpatient physical therapy to address the remaining functional deficits, provide pt/family education, and to maximize pt's level of independence in the home and community environment. .     Goals:   Short Term Goals: 3 weeks  MORGAN Balance score= 36 or greater to improve balance with standing and walking  Increase strength 1/2 muscle grade bilateral LE's  Be able to perform HEP with minimal cueing required  Improve functional impairment of mobility to " 50%      Long Term Goals: 6 weeks  IBerg Balance Score =40 or greater for improved balance with standing and walking and to decrease fall risk  Improve muscle strength 1 muscle grade  Improve muscle strength with MMT to 4/5 to 4+/5  Restore ability to ambulate with improved gait pattern using rollator in house and community  Restore ability to stand for ADL with improved balance  Restore ability to perform all transfers safely and independently  Restore ability to perform ADL's and household activities independently with improved balance  Improve functional impairment of mobility to 40%    Anticipated barriers to physical therapy: Patient's motivation to comply with PT and HEP  Pt's spiritual, cultural and educational needs considered and pt agreeable to plan of care and goals        Plan   Continue with established Plan of Care towards PT goals.

## 2017-02-13 ENCOUNTER — CLINICAL SUPPORT (OUTPATIENT)
Dept: REHABILITATION | Facility: HOSPITAL | Age: 72
End: 2017-02-13
Attending: INTERNAL MEDICINE
Payer: MEDICARE

## 2017-02-13 DIAGNOSIS — R26.9 GAIT DIFFICULTY: Primary | ICD-10-CM

## 2017-02-13 DIAGNOSIS — M62.81 MUSCLE WEAKNESS OF LOWER EXTREMITY: ICD-10-CM

## 2017-02-13 PROCEDURE — 97110 THERAPEUTIC EXERCISES: CPT

## 2017-02-13 NOTE — PROGRESS NOTES
"Name: Goyo Kong [place]  Clinic Number: [id number]  Diagnosis:   Encounter Diagnoses   Name Primary?    Muscle weakness of lower extremity     Gait difficulty Yes     Physician: Chrissy Hayes MD  Treatment Orders: PT Eval and Treat  Past Medical History   Diagnosis Date    Acute anterior myocardial infarction 7/10/2007     s/p PTCA    CVA (cerebral infarction)      no deficits    Hypertension     Osteoarthritis     Stroke        Precautions: CVA  Visit # authorized: 7/25 on 2/13/2017  Authorization period: 12/31/17  Plan of care expiration: 3/2/17  Start time: 9:00 AM  Stop Time: 10:00 AM       Procedures: TE      Timed:  Procedure: TE - 4  Minutes: 60      Untimed:  Procedure:  Minutes:      Total Timed Minutes: 60  Total Timed Units: 4  Total Untimed Units: 0  Charged Billed # of Units: 4    Subjective     Pt reports: no new complaints.    Pain Scale: before treatment: 9/10 currently; after treatment: 9/10    Objective       TREATMENT  Therapeutic exercise: Goyo Kong received therapeutic exercises as per log below, along with today's progressions, 1:1 with PTA x 60 minutes to develop ROM, flexibility and strength including:      Date  02/13/17 02/10/17 02/08/17 2/2/17 01/31/17 01/24/17   VISIT 7/25 6/25 5/25 4/25 3/25 2/25   G CODE VISIT 7/10 6/10 5/10 4/10 3/10 2/10   POC EXP. DATE 03/02/17 03/02/17 03/02/17 3/2/17 03/02/17 03/02/17   VISIT AMOUNT     MEDICARE TOTAL 127.92    779.66 63.96    651.74 95.94    587.78 96.00    491.84 127.92     395.84 127.92     267.92   FACE-TO-FACE 02/18/17 02/18/17 02/18/17 2/18/17 02/18/17 02/18/17               TABLE:           Glut sets 10 x 5" 10 x 5" 10 x 5" 10 x 5" 10 x 3" 10 x 3"   Quad sets 15 x 5" 15 x 5" 15 x 5" 15 x 5" 15 x 3" 15 x 3"   GSS w/ strap long sit 5 x 10" 5 x 10" 5 x 10" 5 x 10" 5 x 10" 5 x 10"   Bridge  1 x 20 1 x 20 1 x 15 1 x 15 1 x 15 1 x 10   LTR 1 x 20 1 x 20 1 x 20 1 x 15 1 x 15 1 x 10   Marching  1 x 15 1 x 15 1 x 15 1 x 10 1 x 10 1 x 10   SLR " "1 x 20 1 x 15 1 x 15 1 x 10 3 x 5 1 x 10   Hip abduction 1 x 20 RTB 1 x 20 RTB 1 x 20 RTB 1 x 15 RTB 1 x 15 RTB 1 x 15 YTB   Hip adduction 15 x 3" w/ ball 15 x 3" w/ ball 15 x 3" w/ ball 1 x 15 w/ball 15 x 3" w/ ball 10 x 3" w/ ball   SAQ oot 1 x 20 1 x 20 1 x 15 1 x 15 1 x 10   Heel Slides oot 1 x 10 1 x 10 1 x 10 -- --   SEATED:           LAQs oot 1 x 10 1 x 10 1 x10 1 x 10 1 x 10   HS curls oot -- --  -- --   Seated Hip Flex oot 1 x 10 1 x 10 1 x10 1 x 10 1 x 10   STANDING:           Hip Abd -- -- --  -- --   Hip Flex -- -- --  -- --   Hip Ext -- -- --  -- --   HS Curls -- -- --  -- --   Step Ups -- -- --  -- --      --         CP declined declined declined  declined declined               Initials GWA 3/6 GWA 2/6 GWA 1/6 VK GWA 2/6 GWA 1/6        Written Home Exercises Provided: Continue with current hEP  Pt demo good understanding of the education provided. Goyo Kong demonstrated good return demonstration of activities.     Pt. education:  · Posture reeducation, body mechanics, HEP,   · No spiritual or educational barriers to learning provided  · Pt has no cultural, educational or language barriers to learning provided.    Assessment     Patient continues to require constant supervision and verbal cueing with all exercises to perform properly and to work through full available ROM.  Patient complains continuously throughout treatment session.  Patient did not complete all of his exercises today due to taking too long with increased reps of other exercises. Patient ambulates using rollator with shuffling gait pattern and stopping several times between waiting room to clinic and back. Patient instructed to continue with HEP daily.  Pt will continue to benefit from skilled outpatient physical therapy to address the remaining functional deficits, provide pt/family education, and to maximize pt's level of independence in the home and community environment. .     Goals:   Short Term Goals: 3 weeks  MORGAN Balance " score= 36 or greater to improve balance with standing and walking  Increase strength 1/2 muscle grade bilateral LE's  Be able to perform HEP with minimal cueing required  Improve functional impairment of mobility to 50%      Long Term Goals: 6 weeks  IBerg Balance Score =40 or greater for improved balance with standing and walking and to decrease fall risk  Improve muscle strength 1 muscle grade  Improve muscle strength with MMT to 4/5 to 4+/5  Restore ability to ambulate with improved gait pattern using rollator in house and community  Restore ability to stand for ADL with improved balance  Restore ability to perform all transfers safely and independently  Restore ability to perform ADL's and household activities independently with improved balance  Improve functional impairment of mobility to 40%    Anticipated barriers to physical therapy: Patient's motivation to comply with PT and HEP  Pt's spiritual, cultural and educational needs considered and pt agreeable to plan of care and goals        Plan   Continue with established Plan of Care towards PT goals.

## 2017-02-16 ENCOUNTER — CLINICAL SUPPORT (OUTPATIENT)
Dept: REHABILITATION | Facility: HOSPITAL | Age: 72
End: 2017-02-16
Attending: INTERNAL MEDICINE
Payer: MEDICARE

## 2017-02-16 DIAGNOSIS — R26.9 GAIT DIFFICULTY: Primary | ICD-10-CM

## 2017-02-16 DIAGNOSIS — M62.81 MUSCLE WEAKNESS OF LOWER EXTREMITY: ICD-10-CM

## 2017-02-16 PROCEDURE — 97110 THERAPEUTIC EXERCISES: CPT

## 2017-02-16 NOTE — PROGRESS NOTES
"Name: Goyo Kong [place]  Clinic Number: [id number]  Diagnosis:   Encounter Diagnoses   Name Primary?    Muscle weakness of lower extremity     Gait difficulty Yes     Physician: Chrissy Hayes MD  Treatment Orders: PT Eval and Treat  Past Medical History   Diagnosis Date    Acute anterior myocardial infarction 7/10/2007     s/p PTCA    CVA (cerebral infarction)      no deficits    Hypertension     Osteoarthritis     Stroke        Precautions: CVA  Visit # authorized: 8/25 on 2/16/2017  Authorization period: 12/31/17  Plan of care expiration: 3/2/17  Start time: 11:00 AM  Stop Time: 12:00 PM       Procedures: TE      Timed:  Procedure: TE - 4  Minutes: 60      Untimed:  Procedure:  Minutes:      Total Timed Minutes: 60  Total Timed Units: 4  Total Untimed Units: 0  Charged Billed # of Units: 4    Subjective     Pt reports: having no pain today.    Pain Scale: before treatment: 0/10 currently    Objective       TREATMENT  Therapeutic exercise: Goyo Kong received therapeutic exercises as per log below, along with today's progressions, 1:1 with PTA x 60 minutes to develop ROM, flexibility and strength including:      Date  02/16/17 02/13/17 02/10/17 02/08/17 2/2/17 01/31/17 01/24/17   VISIT 8/25 7/25 6/25 5/25 4/25 3/25 2/25   G CODE VISIT 8/10 7/10 6/10 5/10 4/10 3/10 2/10   POC EXP. DATE 03/02/17 03/02/17 03/02/17 03/02/17 3/2/17 03/02/17 03/02/17   VISIT AMOUNT     MEDICARE TOTAL 127.92    907.58 127.92    779.66 63.96    651.74 95.94    587.78 96.00    491.84 127.92     395.84 127.92     267.92   FACE-TO-FACE 02/16/17 02/18/17 02/18/17 02/18/17 2/18/17 02/18/17 02/18/17                TABLE:            Glut sets 10 x 5" 10 x 5" 10 x 5" 10 x 5" 10 x 5" 10 x 3" 10 x 3"   Quad sets 15 x 5" 15 x 5" 15 x 5" 15 x 5" 15 x 5" 15 x 3" 15 x 3"   GSS w/ strap long sit 5 x 10" 5 x 10" 5 x 10" 5 x 10" 5 x 10" 5 x 10" 5 x 10"   Bridge  1 x 20 1 x 20 1 x 20 1 x 15 1 x 15 1 x 15 1 x 10   LTR 1 x 25 1 x 20 1 x 20 1 x 20 1 x 15 " "1 x 15 1 x 10   Marching  1 x 20 1 x 15 1 x 15 1 x 15 1 x 10 1 x 10 1 x 10   SLR 1 x 20 1 x 20 1 x 15 1 x 15 1 x 10 3 x 5 1 x 10   Hip abduction 1 x 20 GTB 1 x 20 RTB 1 x 20 RTB 1 x 20 RTB 1 x 15 RTB 1 x 15 RTB 1 x 15 YTB   Hip adduction 15 x 3" w/ ball 15 x 3" w/ ball 15 x 3" w/ ball 15 x 3" w/ ball 1 x 15 w/ball 15 x 3" w/ ball 10 x 3" w/ ball   SAQ 1 x 20 oot 1 x 20 1 x 20 1 x 15 1 x 15 1 x 10   Heel Slides oot oot 1 x 10 1 x 10 1 x 10 -- --   SEATED:            LAQs oot oot 1 x 10 1 x 10 1 x10 1 x 10 1 x 10   HS curls oot oot -- --  -- --   Seated Hip Flex oot oot 1 x 10 1 x 10 1 x10 1 x 10 1 x 10   STANDING:            Hip Abd -- -- -- --  -- --   Hip Flex -- -- -- --  -- --   Hip Ext -- -- -- --  -- --   HS Curls -- -- -- --  -- --   Step Ups -- -- -- --  -- --       --         CP declined declined declined declined  declined declined                Initials GWA 4/6 GWA 3/6 GWA 2/6 GWA 1/6 VK GWA 2/6 GWA 1/6        Written Home Exercises Provided: Continue with current hEP  Pt demo good understanding of the education provided. Goyo Kong demonstrated good return demonstration of activities.     Pt. education:  · Posture reeducation, body mechanics, HEP,   · No spiritual or educational barriers to learning provided  · Pt has no cultural, educational or language barriers to learning provided.    Assessment     Patient continues to require constant supervision and verbal cueing with all exercises to perform properly and to work through full available ROM.  Patient complains continuously throughout treatment session.  Patient did not complete all of his exercises today to taking several rest breaks. Patient ambulates using rollator with shuffling gait pattern and stopping several times between waiting room to clinic and back. Patient instructed to continue with HEP daily.  Pt will continue to benefit from skilled outpatient physical therapy to address the remaining functional deficits, provide pt/family education, " and to maximize pt's level of independence in the home and community environment. .     Goals:   Short Term Goals: 3 weeks  MORGAN Balance score= 36 or greater to improve balance with standing and walking  Increase strength 1/2 muscle grade bilateral LE's  Be able to perform HEP with minimal cueing required  Improve functional impairment of mobility to 50%      Long Term Goals: 6 weeks  IBerg Balance Score =40 or greater for improved balance with standing and walking and to decrease fall risk  Improve muscle strength 1 muscle grade  Improve muscle strength with MMT to 4/5 to 4+/5  Restore ability to ambulate with improved gait pattern using rollator in house and community  Restore ability to stand for ADL with improved balance  Restore ability to perform all transfers safely and independently  Restore ability to perform ADL's and household activities independently with improved balance  Improve functional impairment of mobility to 40%    Anticipated barriers to physical therapy: Patient's motivation to comply with PT and HEP  Pt's spiritual, cultural and educational needs considered and pt agreeable to plan of care and goals        Plan   Continue with established Plan of Care towards PT goals.

## 2017-02-21 ENCOUNTER — CLINICAL SUPPORT (OUTPATIENT)
Dept: REHABILITATION | Facility: HOSPITAL | Age: 72
End: 2017-02-21
Attending: INTERNAL MEDICINE
Payer: MEDICARE

## 2017-02-21 ENCOUNTER — DOCUMENTATION ONLY (OUTPATIENT)
Dept: REHABILITATION | Facility: HOSPITAL | Age: 72
End: 2017-02-21

## 2017-02-21 DIAGNOSIS — M62.81 MUSCLE WEAKNESS OF LOWER EXTREMITY: ICD-10-CM

## 2017-02-21 DIAGNOSIS — R26.9 GAIT DIFFICULTY: Primary | ICD-10-CM

## 2017-02-21 PROCEDURE — 97110 THERAPEUTIC EXERCISES: CPT

## 2017-02-21 NOTE — PROGRESS NOTES
"Name: Goyo Craig  Clinic Number: [id number]  Diagnosis:   Encounter Diagnoses   Name Primary?    Muscle weakness of lower extremity     Gait difficulty Yes     Physician: Chrissy Hayes MD  Treatment Orders: PT Eval and Treat  Past Medical History   Diagnosis Date    Acute anterior myocardial infarction 7/10/2007     s/p PTCA    CVA (cerebral infarction)      no deficits    Hypertension     Osteoarthritis     Stroke        Precautions: CVA  Visit # authorized: 9/25 on 2/21/2017  Authorization period: 12/31/17  Plan of care expiration: 3/2/17  Start time: 9:30 AM  Stop Time: 10:30 AM       Procedures: TE      Timed:  Procedure: TE - 3  Minutes: 45      Untimed:  Procedure:  Minutes:      Total Timed Minutes: 45  Total Timed Units: 3  Total Untimed Units: 0  Charged Billed # of Units: 3    Subjective     Pt reports: his back and legs are hurting today, thinks it may be from the rainy weather.    Pain Scale: before treatment: 4/10 currently    Objective       TREATMENT  Therapeutic exercise: Goyo Kong received therapeutic exercises as per log below, along with today's progressions, 1:1 with PTA x 45 minutes and supervised exercises by PTA x 15 minutes to develop ROM, flexibility and strength including:      Date  02/21/17 02/16/17 02/13/17 02/10/17 02/08/17 2/2/17 01/31/17 01/24/17   VISIT 9/25 8/25 7/25 6/25 5/25 4/25 3/25 2/25   G CODE VISIT 9/10 8/10 7/10 6/10 5/10 4/10 3/10 2/10   POC EXP. DATE 03/02/17 03/02/17 03/02/17 03/02/17 03/02/17 3/2/17 03/02/17 03/02/17   VISIT AMOUNT     MEDICARE TOTAL 127.92    1035.50 127.92    907.58 127.92    779.66 63.96    651.74 95.94    587.78 96.00    491.84 127.92     395.84 127.92     267.92   FACE-TO-FACE 03/21/17 02/16/17 02/18/17 02/18/17 02/18/17 2/18/17 02/18/17 02/18/17                 TABLE:             Glut sets 10 x 5" 10 x 5" 10 x 5" 10 x 5" 10 x 5" 10 x 5" 10 x 3" 10 x 3"   Quad sets 10 x 5" 15 x 5" 15 x 5" 15 x 5" 15 x 5" 15 x 5" 15 x 3" 15 x 3"   GSS " "w/ strap long sit 5 x 10' 5 x 10" 5 x 10" 5 x 10" 5 x 10" 5 x 10" 5 x 10" 5 x 10"   Bridge  1 x 25 1 x 20 1 x 20 1 x 20 1 x 15 1 x 15 1 x 15 1 x 10   LTR 1 x 25 1 x 25 1 x 20 1 x 20 1 x 20 1 x 15 1 x 15 1 x 10   Marching  1 x 25 1 x 20 1 x 15 1 x 15 1 x 15 1 x 10 1 x 10 1 x 10   SLR 1 x 20 1 x 20 1 x 20 1 x 15 1 x 15 1 x 10 3 x 5 1 x 10   Hip abduction 1 x 20 GTB 1 x 20 GTB 1 x 20 RTB 1 x 20 RTB 1 x 20 RTB 1 x 15 RTB 1 x 15 RTB 1 x 15 YTB   Hip adduction 20 x 3" w/ ball 15 x 3" w/ ball 15 x 3" w/ ball 15 x 3" w/ ball 15 x 3" w/ ball 1 x 15 w/ball 15 x 3" w/ ball 10 x 3" w/ ball   SAQ 1 x 20 1 x 20 oot 1 x 20 1 x 20 1 x 15 1 x 15 1 x 10   Heel Slides 1 x 10 oot oot 1 x 10 1 x 10 1 x 10 -- --   SEATED:             LAQs 1 x 10 oot oot 1 x 10 1 x 10 1 x10 1 x 10 1 x 10   HS curls oot oot oot -- --  -- --   Seated Hip Flex oot oot oot 1 x 10 1 x 10 1 x10 1 x 10 1 x 10   STANDING:             Hip Abd -- -- -- -- --  -- --   Hip Flex -- -- -- -- --  -- --   Hip Ext -- -- -- -- --  -- --   HS Curls -- -- -- -- --  -- --   Step Ups -- -- -- -- --  -- --        --         CP declined declined declined declined declined  declined declined                 Initials GWA 5/6 GWA 4/6 GWA 3/6 GWA 2/6 GWA 1/6 VK GWA 2/6 GWA 1/6        Written Home Exercises Provided: Continue with current hEP  Pt demo good understanding of the education provided. Goyo Kong demonstrated good return demonstration of activities.     Pt. education:  · Posture reeducation, body mechanics, HEP,   · No spiritual or educational barriers to learning provided  · Pt has no cultural, educational or language barriers to learning provided.    Assessment     Patient continues to require constant supervision and verbal cueing with all exercises to perform properly and to work through full available ROM.  Patient complains continuously throughout treatment session.  Patient did not complete all of his exercises today to taking several rest breaks and today's " progressions took longer. Patient ambulates using rollator with shuffling gait pattern and stopping several times between waiting room to clinic and back. Patient instructed to continue with HEP daily.  Pt will continue to benefit from skilled outpatient physical therapy to address the remaining functional deficits, provide pt/family education, and to maximize pt's level of independence in the home and community environment. .     Goals:   Short Term Goals: 3 weeks  MORGAN Balance score= 36 or greater to improve balance with standing and walking  Increase strength 1/2 muscle grade bilateral LE's  Be able to perform HEP with minimal cueing required  Improve functional impairment of mobility to 50%      Long Term Goals: 6 weeks  IBerg Balance Score =40 or greater for improved balance with standing and walking and to decrease fall risk  Improve muscle strength 1 muscle grade  Improve muscle strength with MMT to 4/5 to 4+/5  Restore ability to ambulate with improved gait pattern using rollator in house and community  Restore ability to stand for ADL with improved balance  Restore ability to perform all transfers safely and independently  Restore ability to perform ADL's and household activities independently with improved balance  Improve functional impairment of mobility to 40%    Anticipated barriers to physical therapy: Patient's motivation to comply with PT and HEP  Pt's spiritual, cultural and educational needs considered and pt agreeable to plan of care and goals        Plan   Continue with established Plan of Care towards PT goals.

## 2017-02-21 NOTE — PROGRESS NOTES
Face to Face PTA Conference performed with Rory Boo, PTA regarding patient's current status, overall progress, and plan of care    Face to Face PTA Conference performed with Sade Thomas, PT regarding patient's current status, overall progress, and plan of care    Rory Boo  2/21/2017

## 2017-02-23 ENCOUNTER — CLINICAL SUPPORT (OUTPATIENT)
Dept: REHABILITATION | Facility: HOSPITAL | Age: 72
End: 2017-02-23
Attending: INTERNAL MEDICINE
Payer: MEDICARE

## 2017-02-23 DIAGNOSIS — R26.9 GAIT DIFFICULTY: Primary | ICD-10-CM

## 2017-02-23 DIAGNOSIS — M62.81 MUSCLE WEAKNESS OF LOWER EXTREMITY: ICD-10-CM

## 2017-02-23 PROCEDURE — 97110 THERAPEUTIC EXERCISES: CPT | Performed by: PHYSICAL MEDICINE & REHABILITATION

## 2017-02-23 PROCEDURE — G8979 MOBILITY GOAL STATUS: HCPCS | Mod: CK | Performed by: PHYSICAL MEDICINE & REHABILITATION

## 2017-02-23 PROCEDURE — G8978 MOBILITY CURRENT STATUS: HCPCS | Mod: CK | Performed by: PHYSICAL MEDICINE & REHABILITATION

## 2017-02-23 NOTE — PROGRESS NOTES
Name: Goyo Craig  Clinic Number: [id number]  Diagnosis:   Encounter Diagnoses   Name Primary?    Muscle weakness of lower extremity     Gait difficulty Yes     Physician: Chrissy Hayes MD  Treatment Orders: PT Eval and Treat  Past Medical History   Diagnosis Date    Acute anterior myocardial infarction 7/10/2007     s/p PTCA    CVA (cerebral infarction)      no deficits    Hypertension     Osteoarthritis     Stroke        Precautions: CVA  Visit # authorized: 10/25 on 2/23/2017  Authorization period: 12/31/17  Plan of care expiration: 3/2/17  Start time: 9:30 AM  Stop Time:   10:10 AM       Procedures: TE      Timed:  Procedure: TE x3  Minutes: 45      Untimed:  Procedure:0  Minutes:      Total Timed Minutes:45  Total Timed Units:3  Total Untimed Units:0  Charged Billed # of Units:3    Subjective     Pt reports: States tends to have pain in his calves.  States doesn't perform exercises much at home.    Pain Scale: before treatment: 4/10 currently; after treatment: 4/10    Objective                                                                       PROGRESS NOTE       2/23/17  Muscle Strength  MMT R L   Hip flexion 3/5 3/5   Hip abduction 3/5 3/5   Hip extension 3/5 3/5   Hip ER 3/5 3/5   Hip IR 3/5 3/5   Knee extension 4/5 4/5   Knee flexion 4/5 4/5   Ankle dorsiflexion 4/5 4/5   Ankle plantar flexion 4/5 4/5   Ankle inversion 4/5 4/5   Ankle eversion 4/5 4/5     Special tests: The Timed Up and Go: 127 seconds  30 Second Chair stand Test: 0--uses hands  [place] Balance Test: 22/56  High risk for falls    1/19/17  Muscle Strength  MMT R L   Hip flexion 3/5 3/5   Hip abduction 3/5 3/5   Hip extension 3/5 3/5   Hip ER 3/5 3/5   Hip IR 3/5 3/5   Knee extension 4/5 4/5   Knee flexion 4/5 4/5   Ankle dorsiflexion 4/5 4/5   Ankle plantar flexion 4/5 4/5   Ankle inversion 4/5 4/5   Ankle eversion 4/5 4/5     Special tests: The Timed Up and Go: 127 seconds  30 Second Chair stand Test: 0--uses hands  [place]  "Balance Test:     TREATMENT  Therapeutic exercise: Goyo Kong received therapeutic exercises to develop ROM, flexibility and strength for 45 minutes includin:1 with PT  Date  2/23/17 02/21/17 02/16/17 02/13/17 02/10/17 02/08/17 2/2/17 01/31/17 01/24/17   VISIT 10/25 9/25 8/25 7/25 6/25 5/25 4/25 3/25 2/25   G CODE VISIT 10/10 9/10 8/10 7/10 6/10 5/10 4/10 3/10 2/10   POC EXP. DATE 3/2/17 03/02/17 03/02/17 03/02/17 03/02/17 03/02/17 3/2/17 03/02/17 03/02/17   VISIT AMOUNT      MEDICARE TOTAL  127.92     1035.50 127.92     907.58 127.92     779.66 63.96     651.74 95.94     587.78 96.00     491.84 127.92      395.84 127.92      267.92   FACE-TO-FACE  17                           TABLE:                      Glut sets 10 x 5" 10 x 5" 10 x 5" 10 x 5" 10 x 5" 10 x 5" 10 x 5" 10 x 3" 10 x 3"   Quad sets 10 x 5'" 10 x 5" 15 x 5" 15 x 5" 15 x 5" 15 x 5" 15 x 5" 15 x 3" 15 x 3"   GSS w/ strap long sit  5 x 10' 5 x 10" 5 x 10" 5 x 10" 5 x 10" 5 x 10" 5 x 10" 5 x 10"   Bridge  1 x 15 1 x 25 1 x 20 1 x 20 1 x 20 1 x 15 1 x 15 1 x 15 1 x 10   LTR 1 x 15 1 x 25 1 x 25 1 x 20 1 x 20 1 x 20 1 x 15 1 x 15 1 x 10   Marching  1 x 10 1 x 25 1 x 20 1 x 15 1 x 15 1 x 15 1 x 10 1 x 10 1 x 10   SLR 1 x 20 1 x 20 1 x 20 1 x 20 1 x 15 1 x 15 1 x 10 3 x 5 1 x 10   Hip abduction 1 x 20 GTB 1 x 20 GTB 1 x 20 GTB 1 x 20 RTB 1 x 20 RTB 1 x 20 RTB 1 x 15 RTB 1 x 15 RTB 1 x 15 YTB   Hip adduction 1 x 20 20 x 3" w/ ball 15 x 3" w/ ball 15 x 3" w/ ball 15 x 3" w/ ball 15 x 3" w/ ball 1 x 15 w/ball 15 x 3" w/ ball 10 x 3" w/ ball   SAQ 1 x 20 1 x 20 1 x 20 oot 1 x 20 1 x 20 1 x 15 1 x 15 1 x 10   Heel Slides 1 x 10 1 x 10 oot oot 1 x 10 1 x 10 1 x 10 -- --   SEATED:                      LAQs 1 x 10 1 x 10 oot oot 1 x 10 1 x 10 1 x10 1 x 10 1 x 10   HS curls oot oot oot oot -- --   -- --   Seated Hip Flex oot oot oot oot 1 x 10 1 x 10 1 x10 1 x 10 1 x 10   STANDING:              "         Hip Abd  -- -- -- -- --   -- --   Hip Flex  -- -- -- -- --   -- --   Hip Ext  -- -- -- -- --   -- --   HS Curls  -- -- -- -- --   -- --   Step Ups  -- -- -- -- --   -- --             --             CP  declined declined declined declined declined   declined declined                           Initials VK GWA 5/6 GWA 4/6 GWA 3/6 GWA 2/6 GWA 1/6 VK GWA 2/6 GWA 1/6        Outcome measures:   Impairment function: Mobility  FOTO score: 43/100  · G-code current: CK at least 40%, but < 60% impaired, limited or restricted      · G-code goal: CK at least 40% but < 60% impaired, limited or restricted            · PT reviewed FOTO scores for Goyo Craig on 2/23/17.   · FOTO scores were entered into [employer] - see media section.          Written Home Exercises Provided: Continue with current hEP  Pt demo good understanding of the education provided. Goyo Kong demonstrated good return demonstration of activities.     Pt. education:  · Posture reeducation, body mechanics, HEP,   · No spiritual or educational barriers to learning provided  · Pt has no cultural, educational or language barriers to learning provided.      Assessment  Reassessment for 10th visit. Patient has not made progress with physical therapy due to poor compliance with HEP. Patient continues to require constant supervision and verbal cueing with all exercises to perform properly and to work through full available ROM. Patient complains continuously throughout treatment session. Patient did not complete all of his exercises today due to  today's reassessmentr. Patient ambulates using rollator with shuffling gait pattern and stopping several times between waiting room to clinic and back. Patient instructed to continue with HEP daily. Pt will continue to benefit from skilled outpatient physical therapy to address the remaining functional deficits, provide pt/family education, and to maximize pt's level of independence in the home and community  environment.  Patient has one remaining visit per POC which ends 3/2/17.         Goals:   Short Term Goals: 3 weeks  MORGAN Balance score= 36 or greater to improve balance with standing and walking  Increase strength 1/2 muscle grade bilateral LE's  Be able to perform HEP with minimal cueing required  Improve functional impairment of mobility to 50%      Long Term Goals: 6 weeks  IBerg Balance Score =40 or greater for improved balance with standing and walking and to decrease fall risk  Improve muscle strength 1 muscle grade  Improve muscle strength with MMT to 4/5 to 4+/5  Restore ability to ambulate with improved gait pattern using rollator in house and community  Restore ability to stand for ADL with improved balance  Restore ability to perform all transfers safely and independently  Restore ability to perform ADL's and household activities independently with improved balance  Improve functional impairment of mobility to 40%    Anticipated barriers to physical therapy: Patient's motivation to comply with PT and HEP  Pt's spiritual, cultural and educational needs considered and pt agreeable to plan of care and goals        Plan   Plan for discharge next visit.              Marlyn Goodman, PT

## 2017-03-02 ENCOUNTER — CLINICAL SUPPORT (OUTPATIENT)
Dept: REHABILITATION | Facility: HOSPITAL | Age: 72
End: 2017-03-02
Attending: INTERNAL MEDICINE
Payer: MEDICARE

## 2017-03-02 DIAGNOSIS — R26.9 GAIT DIFFICULTY: Primary | ICD-10-CM

## 2017-03-02 DIAGNOSIS — M62.81 MUSCLE WEAKNESS OF LOWER EXTREMITY: ICD-10-CM

## 2017-03-02 PROCEDURE — 97110 THERAPEUTIC EXERCISES: CPT | Performed by: PHYSICAL MEDICINE & REHABILITATION

## 2017-03-02 PROCEDURE — G8980 MOBILITY D/C STATUS: HCPCS | Mod: CK | Performed by: PHYSICAL MEDICINE & REHABILITATION

## 2017-03-02 PROCEDURE — G8979 MOBILITY GOAL STATUS: HCPCS | Mod: CK | Performed by: PHYSICAL MEDICINE & REHABILITATION

## 2017-03-02 NOTE — PROGRESS NOTES
Name: Goyo Craig  Clinic Number: [id number]  Diagnosis:   Encounter Diagnoses   Name Primary?    Muscle weakness of lower extremity     Gait difficulty Yes     Physician: Chrissy Hayes MD  Treatment Orders: PT Eval and Treat  Past Medical History:   Diagnosis Date    Acute anterior myocardial infarction 7/10/2007    s/p PTCA    CVA (cerebral infarction)     no deficits    Hypertension     Osteoarthritis     Stroke        Precautions: CVA  Visit # authorized: 11/25 on 3/2/2017  Authorization period: 12/31/17  Plan of care expiration: 3/2/17  Start time: 10:10 AM  Stop Time:   10:55 AM       Procedures: TE      Timed:  Procedure: TE x3  Minutes: 45      Untimed:  Procedure:0  Minutes:      Total Timed Minutes:45  Total Timed Units:3  Total Untimed Units:0  Charged Billed # of Units:3    Subjective     Pt reports: States having stiffness in his calves.  States doesn't perform exercises much at home.    Pain Scale: before treatment: 0/10 currently; after treatment: 4/10    Objective                                                                       DISCHARGE   NOTE     3/2/17  Muscle Strength  MMT R L   Hip flexion 3/5 3/5   Hip abduction 3/5 3/5   Hip extension 3/5 3/5   Hip ER 3/5 3/5   Hip IR 3/5 3/5   Knee extension 4/5 4/5   Knee flexion 4/5 4/5   Ankle dorsiflexion 4/5 4/5   Ankle plantar flexion 4/5 4/5   Ankle inversion 4/5 4/5   Ankle eversion 4/5 4/5     Special tests: The Timed Up and Go: 127 seconds  30 Second Chair stand Test: 0--uses hands  [place] Balance Test: 22/56  High risk for falls      2/23/17  Muscle Strength  MMT R L   Hip flexion 3/5 3/5   Hip abduction 3/5 3/5   Hip extension 3/5 3/5   Hip ER 3/5 3/5   Hip IR 3/5 3/5   Knee extension 4/5 4/5   Knee flexion 4/5 4/5   Ankle dorsiflexion 4/5 4/5   Ankle plantar flexion 4/5 4/5   Ankle inversion 4/5 4/5   Ankle eversion 4/5 4/5     Special tests: The Timed Up and Go: 127 seconds  30 Second Chair stand Test: 0--uses hands  [place] Balance  "Test:   High risk for falls        TREATMENT  Therapeutic exercise: Goyo Kong received therapeutic exercises to develop ROM, flexibility and strength for 45 minutes includin:1 with PT  Date  3/2/17 2/23/17 02/21/17 02/16/17 02/13/17 02/10/17 02/08/17 2/2/17 01/31/17 01/24/17   VISIT 11/25 10/25 9/25 8/25 7/25 6/25 5/25 4/25 3/25 2/25   G CODE VISIT 11/10 10/10 9/10 8/10 7/10 6/10 5/10 4/10 3/10 2/10   POC EXP. DATE 3/2/17 3/2/17 03/02/17 03/02/17 03/02/17 03/02/17 03/02/17 3/2/17 03/02/17 03/02/17   VISIT AMOUNT      MEDICARE TOTAL 96.00    1227.50 96.00    1131.50 127.92     1035.50 127.92     907.58 127.92     779.66 63.96     651.74 95.94     587.78 96.00     491.84 127.92      395.84 127.92      267.92   FACE-TO-FACE   17                            TABLE:                       Glut sets 10 x 5" 10 x 5" 10 x 5" 10 x 5" 10 x 5" 10 x 5" 10 x 5" 10 x 5" 10 x 3" 10 x 3"   Quad sets 10 x 5" 10 x 5'" 10 x 5" 15 x 5" 15 x 5" 15 x 5" 15 x 5" 15 x 5" 15 x 3" 15 x 3"   GSS w/ strap long sit 5 x 10"  5 x 10' 5 x 10" 5 x 10" 5 x 10" 5 x 10" 5 x 10" 5 x 10" 5 x 10"   Bridge  1 x 15 1 x 15 1 x 25 1 x 20 1 x 20 1 x 20 1 x 15 1 x 15 1 x 15 1 x 10   LTR 1 x 15 1 x 15 1 x 25 1 x 25 1 x 20 1 x 20 1 x 20 1 x 15 1 x 15 1 x 10   Marching  1 x 10 1 x 10 1 x 25 1 x 20 1 x 15 1 x 15 1 x 15 1 x 10 1 x 10 1 x 10   SLR 2 x 10 1 x 20 1 x 20 1 x 20 1 x 20 1 x 15 1 x 15 1 x 10 3 x 5 1 x 10   Hip abduction 1 x 20 GTB 1 x 20 GTB 1 x 20 GTB 1 x 20 GTB 1 x 20 RTB 1 x 20 RTB 1 x 20 RTB 1 x 15 RTB 1 x 15 RTB 1 x 15 YTB   Hip adduction 1 x 20 w/ball 1 x 20 20 x 3" w/ ball 15 x 3" w/ ball 15 x 3" w/ ball 15 x 3" w/ ball 15 x 3" w/ ball 1 x 15 w/ball 15 x 3" w/ ball 10 x 3" w/ ball   SAQ 2 x 10 1 x 20 1 x 20 1 x 20 oot 1 x 20 1 x 20 1 x 15 1 x 15 1 x 10   Heel Slides 1 x 10 1 x 10 1 x 10 oot oot 1 x 10 1 x 10 1 x 10 -- --   SEATED:                       LAQs 1 x 10 1 x 10 1 x " 10 oot oot 1 x 10 1 x 10 1 x10 1 x 10 1 x 10   HS curls 1 x 10 oot oot oot oot -- --   -- --   Seated Hip Flex 1 x 10 oot oot oot oot 1 x 10 1 x 10 1 x10 1 x 10 1 x 10   STANDING:                       Hip Abd   -- -- -- -- --   -- --   Hip Flex   -- -- -- -- --   -- --   Hip Ext   -- -- -- -- --   -- --   HS Curls   -- -- -- -- --   -- --   Step Ups   -- -- -- -- --   -- --              --             CP   declined declined declined declined declined   declined declined                            Initials  VK GWA 5/6 GWA 4/6 GWA 3/6 GWA 2/6 GWA 1/6 VK GWA 2/6 GWA 1/6        Outcome measures:   Impairment function: Mobility  FOTO score: 43/100  · G-code current: CK at least 40%, but < 60% impaired, limited or restricted      · G-code goal: CK at least 40% but < 60% impaired, limited or restricted            G-code Discharge: CK at least 40% but < 60% impaired, limited or restricted    · PT reviewed FOTO scores for Goyo Jr Craig on 3/2/17.   · FOTO scores were entered into [employer] - see media section.          Written Home Exercises Provided: Continue with current hEP  Pt demo good understanding of the education provided. Goyo Kong demonstrated good return demonstration of activities.     Pt. education:  · Posture reeducation, body mechanics, HEP,   · No spiritual or educational barriers to learning provided  · Pt has no cultural, educational or language barriers to learning provided.      Assessment  Reassessment for discharge. Patient has not made progress with physical therapy due to poor compliance with HEP and patient admits doesn't perform HEP and tends to spend most of his day sitting.Reviewed HEP with patient in hopes of patient continuing at home and patient required constant supervision and verbal cueing with all exercises to perform properly and to work through full available ROM. Patient tends to complains continuously throughout treatment session. POC has ended. Patient hsa met one STG and  has not met LTG.           Goals:   Short Term Goals: 3 weeks  MORGAN Balance score= 36 or greater to improve balance with standing and walking MET  Increase strength 1/2 muscle grade bilateral LE's Not Met  Be able to perform HEP with minimal cueing required Not Met  Improve functional impairment of mobility to 50%  Not Met      Long Term Goals: 6 weeks Not MET  IBerg Balance Score =40 or greater for improved balance with standing and walking and to decrease fall risk  Improve muscle strength 1 muscle grade  Improve muscle strength with MMT to 4/5 to 4+/5  Restore ability to ambulate with improved gait pattern using rollator in house and community  Restore ability to stand for ADL with improved balance  Restore ability to perform all transfers safely and independently  Restore ability to perform ADL's and household activities independently with improved balance  Improve functional impairment of mobility to 40%    Anticipated barriers to physical therapy: Patient's motivation to comply with PT and HEP  Pt's spiritual, cultural and educational needs considered and pt agreeable to plan of care and goals        Plan   Patient is discharged form physical therapy.              Marlyn Goodman, PT

## 2017-05-09 ENCOUNTER — OFFICE VISIT (OUTPATIENT)
Dept: CARDIOLOGY | Facility: CLINIC | Age: 72
End: 2017-05-09
Payer: MEDICARE

## 2017-05-09 VITALS
HEIGHT: 67 IN | WEIGHT: 180 LBS | OXYGEN SATURATION: 94 % | HEART RATE: 85 BPM | BODY MASS INDEX: 28.25 KG/M2 | SYSTOLIC BLOOD PRESSURE: 127 MMHG | DIASTOLIC BLOOD PRESSURE: 88 MMHG

## 2017-05-09 DIAGNOSIS — I10 BENIGN ESSENTIAL HYPERTENSION: Chronic | ICD-10-CM

## 2017-05-09 DIAGNOSIS — I25.10 CORONARY ARTERY DISEASE INVOLVING NATIVE CORONARY ARTERY OF NATIVE HEART WITHOUT ANGINA PECTORIS: Chronic | ICD-10-CM

## 2017-05-09 DIAGNOSIS — I25.10 CAD S/P PERCUTANEOUS CORONARY ANGIOPLASTY: Chronic | ICD-10-CM

## 2017-05-09 DIAGNOSIS — R55 PRE-SYNCOPE: ICD-10-CM

## 2017-05-09 DIAGNOSIS — R35.0 URINARY FREQUENCY: Chronic | ICD-10-CM

## 2017-05-09 DIAGNOSIS — Z98.61 CAD S/P PERCUTANEOUS CORONARY ANGIOPLASTY: Chronic | ICD-10-CM

## 2017-05-09 DIAGNOSIS — R55 VASOVAGAL SYNCOPE: Primary | Chronic | ICD-10-CM

## 2017-05-09 PROCEDURE — 99213 OFFICE O/P EST LOW 20 MIN: CPT | Mod: PBBFAC,PO | Performed by: INTERNAL MEDICINE

## 2017-05-09 PROCEDURE — 99999 PR PBB SHADOW E&M-EST. PATIENT-LVL III: CPT | Mod: PBBFAC,,, | Performed by: INTERNAL MEDICINE

## 2017-05-09 PROCEDURE — 99214 OFFICE O/P EST MOD 30 MIN: CPT | Mod: S$PBB,,, | Performed by: INTERNAL MEDICINE

## 2017-05-09 RX ORDER — TAMSULOSIN HYDROCHLORIDE 0.4 MG/1
0.4 CAPSULE ORAL DAILY
COMMUNITY

## 2017-05-09 RX ORDER — ASPIRIN 81 MG/1
81 TABLET ORAL DAILY
COMMUNITY

## 2017-05-09 NOTE — PROGRESS NOTES
Subjective:   Chief Complaint:  Goyo Craig is a 72 y.o. male who presents for follow-up of syncope and CAD    HPI:   Initial consultation was in 2017.  No recurrence of symptoms.    Goyo Craig is a 71 y.o. male who presents for evaluation of syncope/near-syncope.  Patient has had prior episodes. There was complete loss of consciousness. The episode was witnessed. Onset was over several seconds. Duration of the episode was a few minutes. There  was not a change of mental status after the event. Preceding/concomitant actions:emotional distress associated with memory of a  relative. Symptoms prior to event:lightheadedness and sweating/blurred vision/epigastric discomfort. There is a personal history of heart disease e.g. ASHD/CAD, aortic stenosis/other valvular disease. There is not a personal history of pulmonary hypertension. There is not a personal history of hypokalemia, hypomagnesemia or Long QT syndrome. There is not a family history of sudden death. There is a family history of heart disease e.g. ASHD/CAD, aortic stenosis/other valvular disease.  He was hospitalized on 2017 for this event - TTE and monitoring during hospitalization unrevealing for VHD or arrhythmias.  He is ambulatory with assistance with a walker.     The following portions of the patient's history were reviewed and updated as appropriate: allergies, current medications, past family history, past medical history, past social history, past surgical history and problem list.    Patient Active Problem List    Diagnosis Date Noted    Coronary artery disease involving native coronary artery of native heart without angina pectoris 2017    Bradycardia     Muscle weakness 06/10/2016    Decreased range of motion of lumbar spine 06/10/2016    Difficulty walking 06/10/2016    Pain aggravated by activities of daily living 06/10/2016    Vasovagal syncope 2015    Weakness of left upper extremity 2015      After left neck surgery      Right leg weakness 2015    Urinary frequency 2015    CAD S/P percutaneous coronary angioplasty 2015    Old lacunar stroke without late effect 2015    Benign essential hypertension 2015    Pre-syncope 2015           Right Arm BP - Sittin/88  Left Arm BP - Sittin/79    Current Outpatient Prescriptions   Medication Sig    aspirin (ECOTRIN) 81 MG EC tablet Take 81 mg by mouth once daily.    atorvastatin (LIPITOR) 20 MG tablet Take 1 tablet (20 mg total) by mouth once daily.    baclofen (LIORESAL) 10 MG tablet Take 10 mg by mouth 3 (three) times daily as needed (muscle spasm).    cilostazol (PLETAL) 100 MG Tab Take 50 mg by mouth 2 (two) times daily.    lisinopril (PRINIVIL,ZESTRIL) 5 MG tablet Take 5 mg by mouth once daily.    tamsulosin (FLOMAX) 0.4 mg Cp24 Take 0.4 mg by mouth once daily.     No current facility-administered medications for this visit.        Review of Systems   Constitution: Negative for diaphoresis, weakness, malaise/fatigue, weight gain and weight loss.   HENT: Negative for nosebleeds.    Eyes: Negative for visual disturbance.   Cardiovascular: Negative for chest pain, claudication, cyanosis, dyspnea on exertion, irregular heartbeat, leg swelling, near-syncope, orthopnea, palpitations, paroxysmal nocturnal dyspnea and syncope.   Respiratory: Negative for cough, hemoptysis, shortness of breath, sleep disturbances due to breathing, snoring, sputum production and wheezing.    Endocrine: Negative for polyuria.   Hematologic/Lymphatic: Negative for bleeding problem. Does not bruise/bleed easily.   Skin: Negative for poor wound healing and rash.   Musculoskeletal: Negative for myalgias.   Gastrointestinal: Negative for abdominal pain, heartburn, hematemesis, hematochezia, hemorrhoids and melena.   Genitourinary: Negative for dysuria and frequency.   Neurological: Negative for dizziness, focal weakness,  light-headedness and loss of balance.   Psychiatric/Behavioral: Negative for altered mental status, depression and memory loss.         Objective:   Physical Exam   Constitutional: He is oriented to person, place, and time. He appears well-developed and well-nourished. No distress. He is not intubated.   HENT:   Head: Atraumatic.   Neck: No JVD present.   Cardiovascular: Normal rate, regular rhythm, S1 normal, S2 normal, normal heart sounds and intact distal pulses.  PMI is not displaced.  Exam reveals no gallop, no S3, no S4, no distant heart sounds, no friction rub, no midsystolic click and no opening snap.    No murmur heard.  Pulmonary/Chest: Effort normal and breath sounds normal. No accessory muscle usage. No apnea, no tachypnea and no bradypnea. He is not intubated. No respiratory distress. He has no decreased breath sounds. He has no wheezes. He has no rhonchi. He has no rales. He exhibits no tenderness.   Abdominal: Soft. Normal aorta and bowel sounds are normal. He exhibits no distension, no abdominal bruit, no pulsatile midline mass and no mass. There is no tenderness.   Musculoskeletal: He exhibits no edema.   Neurological: He is alert and oriented to person, place, and time.   Skin: Skin is warm. No rash noted. No erythema. No pallor.   Psychiatric: He has a normal mood and affect. His behavior is normal. Judgment and thought content normal.   Vitals reviewed.    Labs, ECG, Echo reviewed.    Assessment:     1. Vasovagal syncope    2. Benign essential hypertension    3. CAD S/P percutaneous coronary angioplasty    4. Coronary artery disease involving native coronary artery of native heart without angina pectoris    5. Pre-syncope    6. Urinary frequency        Plan:     RTC 8 months.  Continue GDMT for chronic CAD  BP controlled.    Continue with current medical plan and lifestyle changes.    I have reviewed the patient's medical history in detail and updated the computerized patient record.    No orders  of the defined types were placed in this encounter.      Follow up as scheduled. Return sooner for concerns or questions      He expressed verbal understanding and agreed with the plan          Sean Rios MD, FACC, CCDS  Interventional Cardiology  [facility]

## 2017-06-28 ENCOUNTER — CLINICAL SUPPORT (OUTPATIENT)
Dept: REHABILITATION | Facility: HOSPITAL | Age: 72
End: 2017-06-28
Attending: INTERNAL MEDICINE
Payer: MEDICARE

## 2017-06-28 DIAGNOSIS — R26.2 DIFFICULTY WALKING: ICD-10-CM

## 2017-06-28 DIAGNOSIS — R29.898 WEAKNESS OF BOTH LOWER EXTREMITIES: Primary | ICD-10-CM

## 2017-06-28 DIAGNOSIS — R53.81 DEBILITY: ICD-10-CM

## 2017-06-28 PROCEDURE — G8978 MOBILITY CURRENT STATUS: HCPCS | Mod: CM | Performed by: PHYSICAL MEDICINE & REHABILITATION

## 2017-06-28 PROCEDURE — 97162 PT EVAL MOD COMPLEX 30 MIN: CPT | Performed by: PHYSICAL MEDICINE & REHABILITATION

## 2017-06-28 PROCEDURE — G8979 MOBILITY GOAL STATUS: HCPCS | Mod: CK | Performed by: PHYSICAL MEDICINE & REHABILITATION

## 2017-06-28 PROCEDURE — 97110 THERAPEUTIC EXERCISES: CPT | Performed by: PHYSICAL MEDICINE & REHABILITATION

## 2017-06-28 NOTE — PROGRESS NOTES
"OUTPATIENT PHYSICAL THERAPY  PHYSICAL THERAPY EVALUATION    Name: Goyo Kong Rafa  Clinic Number: [id number]    Diagnosis:   Encounter Diagnoses   Name Primary?    Weakness of both lower extremities Yes    Debility     Difficulty walking      Physician: Chrissy Hayes MD  Treatment Orders: PT Eval and Treat    History     Past Medical History:   Diagnosis Date    Acute anterior myocardial infarction 7/10/2007    s/p PTCA    CVA (cerebral infarction)     no deficits    Hypertension     Osteoarthritis     Stroke      Current Outpatient Prescriptions   Medication Sig    aspirin (ECOTRIN) 81 MG EC tablet Take 81 mg by mouth once daily.    atorvastatin (LIPITOR) 20 MG tablet Take 1 tablet (20 mg total) by mouth once daily.    baclofen (LIORESAL) 10 MG tablet Take 10 mg by mouth 3 (three) times daily as needed (muscle spasm).    cilostazol (PLETAL) 100 MG Tab Take 50 mg by mouth 2 (two) times daily.    lisinopril (PRINIVIL,ZESTRIL) 5 MG tablet Take 5 mg by mouth once daily.    tamsulosin (FLOMAX) 0.4 mg Cp24 Take 0.4 mg by mouth once daily.     No current facility-administered medications for this visit.      Review of patient's allergies indicates:  No Known Allergies  Mental status :alert  None  Precautions: Poor balance    Evaluation Date: 6/28/17  Visit # authorized: 1/12  Authorization period: 12/31/17  Plan of care Expiration: 8/9/17    MD referral: Chrissy Hayes MD     Start time: 9:00 AM  Stop Time: 9:45 AM    Timed     Procedure  Min     Units    TE x 1 15 1                    Untimed     Procedure  Min  Units    IE 30 1                 Subjective     Primary concern/ Chief complaints:    Goyo Kong is a 72 y.o. male that presents to [facility] with complaint of feeling "tight and dragging" and weakness in both legs.     Onset/DAKSHA: several years    PLOF:Limited with all ADL's  Prior Physical Therapy: January, 2017- February, 2017; also in 2016    Occupation: Pt does not work    Pt. " presents with the following co-morbidities and personal factors that directly impact  plan of care: CVA several years ago, HTN, leg cramps and swollen ankle; cervical fusion about 2 years ago          Pain Scale: Goyo Kong rates pain on a scale of 0-10 to be  0/10    ADLs: Pt has a decreased ability to perform ADLs such as cooking, housework--has assistance.      Patient Goals:I want to be able to walk without my walker    Objective     Posture Alignment : forward head and shoulders, decreased lordosis lumbar spine. Stands with trunk flexed forward    Sensation: Light Touch: Intact BUE and BLE           ROM:   UPPER EXTREMITIES: AROM/PROM: (measured in degrees):   · (R) UE: limited as follows: Right shoulder: 140 deg passive Flexion and 110 deg passive Abduction.  ·   Actively: 20 to 30 degrees flexion and abduction; elbow, forearm, wrist and hand WFL                                           · (L) UE: Shoulder:  130 passive flexion and 110 passive abduction; actively 20 to 30 degrees flexiona and abduction  ·  elbow, forearm, wrist and hand WFL           LOWER EXTREMITIES: AROM: WFL BLE    Upper Extremity Strength: (graded 1-5 out of 5)    RUE LUE   Shoulder flexion: 2/5 2/5   Shoulder Abduction: 2/5 2/5   Elbow flexion: 4/5 4/5   Elbow extension: 4/5 4/5   Wrist flexion: 4/5 4/5   Wrist extension: 4/5 4/5    gross assessment 4/5 4/5       Lower Extremity Strength: (graded 1-5 out of 5)    RLE LLE   Hip flexion:  3/5 3/5   Hip Abduction 3/5 3/5   Hip Extension 3/5 3/5   Knee extension: 4/5 4/5   Ankle DF: 4+/5 4+/5   Ankle IV 4+/5 4+/5   Ankle EV 4+/5 4+/5   PF 4+/5 4+/5   Knee Flexion 4/5 4/5     GAIT: Goyo Kong ambulates using rollator with slow steps and short stride. Patient tends to drag both feet during toe off.    30 Second Chair Stand: Score: 0 as patient uses both hands to push up from sitting  TU:00 minutes  Patient in high risk for falls category     TREATMENT:  Therapeutic exercise: Goyo Kong  "received therapeutic exercises to develop strength and endurance, flexibility for 15 minutes including:see exercise sheet     Date 6/28/17   Visit 1/12   Gcode Visits  1/10   POC Exp Date 8/9/17   Medicare Charge $108.00   Medicare Total $1336.00   Face to Face    Seated    Glut Sets  10 x 5"   Marching 1 x 10   Hip Abd  1 x 10 RTB   Hip Add  10 x 3" w/ pillow   LAQ (B)  1 x 10                   Initials VK         Functional Status Measures:  Mobility  Intake Score: 20/100     G-code current status:    CM at least 80%, but < 100% impaired, limited or restricted    G-code goal status:         CK at least 40% but < 60% impaired, limited or restricted  PT reviewed FOTO scores for Goyo Kong Rafa on 06/28/2017.   FOTO scores were entered into [employer] - see media section.      Pt. Education: Pt/family educational information was provided, including: role of PT, goals for PT, scheduling - pt verbalized understanding. Instructed pt. regarding: proper form/technique with all exercises. Pt demo good understanding of the education provided. Goyo Kong demonstrated good return demonstration of activities.  · Pt has no cultural, educational or language barriers to learning provided.    Medical necessity is demonstrated by the following IMPAIRMENTS/PROBLEM LIST:   1) Weakness limiting function   2) Posture dysfunction   3) BLE weakness   4) Decreased soft tissue extensibility/fascia restriction   5) Decreased LE flexibility: bilateral hamstring tightness   6) Lack of HEP       GOALS:   Short Term Goals:  3 weeks  1.  Increased MMT for BLE to 4/5 to increase endurance with walking and standing   2. Pt. to demonstrate increased MMT for gluteus medius to 4/5  to increase stability during community ambulation  3.  Increased MMT for hip flexor to 4/5 to increase ability to perform sit to stand transfer   4. Pt to tolerate HEP to improve ROM and independence with ADL's  5. Patient will ambulate with improved gait pattern using " rollator    Long Term Goals: 6 weeks  1. . Pt. to demonstrate increased MMT for gluteus medius to 4+/5  to increase stability during ambulation on uneven surfaces.  2. Increased MMT for hip flexor to 4+/5 to increase tolerance for ADL and work activities.   3. Pt to be independent with HEP to improve ROM and independence with ADL's  4. Patient will ambulate with normal gait pattern using rollator    Assessment     This is a 72 y.o. male referred to outpatient physical therapy who presents with a medical diagnosis of debility, lower extremity weakness and dfificulty walking  and PT diagnosis of debility, BUE and BLE weakness/decreased strength; poor posture, poor endurance, difficulty walking demonstrating joint dysfunction and functional limitation as described above.  The goals were discussed with the patient and Mr. Nance is in agreement with proposed treatment plan. Pt was given a HEP consisting of seated lower extremity strengthening exercies. Pt verbally understood instructions and demonstrated proper form/ technique. Pt was advised to perform these exercises free of pain, and to discontinue use if symptoms persist/worsen. Pt will benefit from physical therapy services in order to maximize pain free functional independence.     History  Co-morbidities and personal factors that may impact the plan of care Examination  Body Structures and Functions, activity limitations and participation restrictions that may impact the plan of care Clinical Presentation   Decision Making/ Complexity Score   Co-morbidities:   CVA, cervical fusion              Personal Factors:   Age Body Regions: bilateral lower extremities    Body Systems: Musculoskeletal: decreased strength BLE; poor posture and flexibility; Neuromuscular: gait instability/difficulty walking          Activity limitations/Participation Restrictions: Mobility; general tasks and demands               Evolving clinical presentation with changing clinical  characteristics     Moderate    FOTO Score: 20 /100         Plan     Outpatient physical therapy  2 times weekly  to include: pt education, HEP, therapeutic exercises, therapeutic activities, neuromuscular re-education/ balance exercises, soft tissue and joint mobilizations;  modalities prn.     Cont PT for  6 weeks. Pt may be seen by PTA as part of the rehabilitation team.     I certify the need for these services furnished under this plan of treatment and while under my care.  ____________________________________ Physician/Referring Practitioner   Date of Signature    Marlyn Goodman PT

## 2017-06-30 ENCOUNTER — CLINICAL SUPPORT (OUTPATIENT)
Dept: REHABILITATION | Facility: HOSPITAL | Age: 72
End: 2017-06-30
Attending: INTERNAL MEDICINE
Payer: MEDICARE

## 2017-06-30 DIAGNOSIS — R26.2 DIFFICULTY WALKING: ICD-10-CM

## 2017-06-30 DIAGNOSIS — R29.898 WEAKNESS OF BOTH LOWER EXTREMITIES: ICD-10-CM

## 2017-06-30 DIAGNOSIS — R53.81 DEBILITY: ICD-10-CM

## 2017-06-30 PROCEDURE — 97110 THERAPEUTIC EXERCISES: CPT

## 2017-06-30 NOTE — PROGRESS NOTES
"TIME RECORD    Date:  06/30/2017    Start Time:  9:00  Stop Time:  10:00    PROCEDURES:    TIMED  Procedure Min.   TE 60                     UNTIMED  Procedure Min.             Total Timed Minutes:  60  Total Timed Units:  4  Total Untimed Units:  0  Charges Billed/# of units:  4 (TE-4)      Progress/Current Status    Subjective:     Patient ID: Goyo Craig is a 72 y.o. male.  Diagnosis:   1. Weakness of both lower extremities     2. Debility     3. Difficulty walking       Pain: 0 /10  Patient reports having no pain today.    Objective:       TREATMENT:  Therapeutic exercise: Goyo Kong received therapeutic exercises, along with new exercises added and today's progressions, 1:1 with PTA x 60 minutes to develop strength, endurance and flexibility including:      Date 06/30/17 6/28/17   Visit 2/12 1/12   Gcode Visits  2/10 1/10   POC Exp Date 08/09/17 8/9/17   Medicare Charge $128.00 $108.00   Medicare Total $1464.00 $1336.00   Face to Face 07/28/17          Seated      HSS/GSS w/ strap 10 x 10"    Glut Sets 20 x 5"  10 x 5"   Marching 2 x 10 1 x 10   Hip Abd 2 x 10 RTB  1 x 10 RTB   Hip Add 20 x 3" w/ ball  10 x 3" w/ pillow   LAQ (B) 1 x 15  1 x 10   Heel raises 1 x 15     Toe raises 1 x 15     Sit-to-stand 1 x 5     Standing:     Marching // 1 x 20    Heel raises // 1 x 15    Hip abduction // 1 x 10          Initials GWA 1/6 VK        Assessment:     Patient requires constant supervision to properly execute each exercise and to use full available ROM.  PTA walked with patient from the gym out to his ride, 200 ft, with regular verbal cues to pick his feet up and not drag them with each step.    Patient Education/Response:     Patient was issued a written copy of today's exercises for his HEP and instructed to perform twice a day.  Patient verbalized understanding instructions.     Plans and Goals:     Continue with POC and progress toward PT goals.    GOALS:   Short Term Goals:  3 weeks  1.  Increased MMT for " BLE to 4/5 to increase endurance with walking and standing   2. Pt. to demonstrate increased MMT for gluteus medius to 4/5  to increase stability during community ambulation  3.  Increased MMT for hip flexor to 4/5 to increase ability to perform sit to stand transfer   4. Pt to tolerate HEP to improve ROM and independence with ADL's  5. Patient will ambulate with improved gait pattern using rollator     Long Term Goals: 6 weeks  1. . Pt. to demonstrate increased MMT for gluteus medius to 4+/5  to increase stability during ambulation on uneven surfaces.  2. Increased MMT for hip flexor to 4+/5 to increase tolerance for ADL and work activities.   3. Pt to be independent with HEP to improve ROM and independence with ADL's  4. Patient will ambulate with normal gait pattern using rollator

## 2017-07-05 ENCOUNTER — CLINICAL SUPPORT (OUTPATIENT)
Dept: REHABILITATION | Facility: HOSPITAL | Age: 72
End: 2017-07-05
Attending: INTERNAL MEDICINE
Payer: MEDICARE

## 2017-07-05 DIAGNOSIS — R26.2 DIFFICULTY WALKING: ICD-10-CM

## 2017-07-05 DIAGNOSIS — R29.898 WEAKNESS OF BOTH LOWER EXTREMITIES: ICD-10-CM

## 2017-07-05 DIAGNOSIS — R53.81 DEBILITY: ICD-10-CM

## 2017-07-05 PROCEDURE — 97110 THERAPEUTIC EXERCISES: CPT | Performed by: PHYSICAL MEDICINE & REHABILITATION

## 2017-07-05 NOTE — PROGRESS NOTES
"Name: Goyo Kong [facility]  Clinic Number: [id number]  Diagnosis:   Encounter Diagnoses   Name Primary?    Weakness of both lower extremities     Debility     Difficulty walking      Physician: Chrissy Hayes MD  Treatment Orders: PT Eval and Treat  Past Medical History:   Diagnosis Date    Acute anterior myocardial infarction 7/10/2007    s/p PTCA    CVA (cerebral infarction)     no deficits    Hypertension     Osteoarthritis     Stroke        Precautions: None  Visit # authorized: 3/12 on 7/5/2017  Authorization period: 12/31/17  Plan of care expiration: 8/9/17    Start time: 9:25 AM  Stop Time: 10:10 AM    Timed     Procedure  Min     Units   TE x 3 45 3                    Untimed     Procedure  Min  Units                      Subjective     Pt reports: has only performed HEP "some". No c/o pain today    Pain Scale: before treatment: 0/10 currently; after treatment: 0/10    Objective         TREATMENT  Therapeutic exercise: Goyo Kong received therapeutic exercises to develop ROM, strength, and flexibility for 45 minutes including:     Date 7/5/17 06/30/17 6/28/17   Visit 3/12 2/12 1/12   Gcode Visits  3/10 2/10 1/10   POC Exp Date 8/9/17 08/09/17 8/9/17   Medicare Charge $96.00 $128.00 $108.00   Medicare Total $1560.00 $1464.00 $1336.00   Face to Face 7/28/17 07/28/17           Seated       HSS/GSS w/ strap 10 x 10" 10 x 10"    Glut Sets 20 x 5" 20 x 5"  10 x 5"   Marching 2 x 10 2 x 10 1 x 10   Hip Abd 2 x 10 RTB 2 x 10 RTB  1 x 10 RTB   Hip Add 20 x 3" w/ ball 20 x 3" w/ ball  10 x 3" w/ pillow   LAQ (B) 1 x 15 1 x 15  1 x 10   Heel raises 1 x 15 1 x 15     Toe raises 1 x 15 1 x 15     Sit-to-stand 1 x 5 1 x 5     Standing:      Marching // 1 x 20 1 x 20    Heel raises // 1 x 15 1 x 15    Hip abduction // 1 x 10 1 x 10           Initials VK GWA 1/6 VK          Written Home Exercises Provided: Continue with current HEP  Pt demo good understanding of the education provided. Goyo Kong demonstrated good return " demonstration of activities.     Pt. education:  · Posture reeducation, body mechanics, HEP,   · No spiritual or educational barriers to learning provided  · Pt has no cultural, educational or language barriers to learning provided.    Assessment     Patient performed above exercise program with verbal cueing for proper technique. Worked with patient to left his feet with toe off when ambulating to improve gait pattern and to not shuffle his feet. Patient instructed to continue with HEP.  Pt will continue to benefit from skilled outpatient physical therapy to address the remaining functional deficits, provide pt/family education, and to maximize pt's level of independence in the home and community environment. .     Goals:   Short Term Goals:  3 weeks  1.  Increased MMT for BLE to 4/5 to increase endurance with walking and standing   2. Pt. to demonstrate increased MMT for gluteus medius to 4/5  to increase stability during community ambulation  3.  Increased MMT for hip flexor to 4/5 to increase ability to perform sit to stand transfer   4. Pt to tolerate HEP to improve ROM and independence with ADL's  5. Patient will ambulate with improved gait pattern using rollator    Long Term Goals: 6 weeks  1. . Pt. to demonstrate increased MMT for gluteus medius to 4+/5  to increase stability during ambulation on uneven surfaces.  2. Increased MMT for hip flexor to 4+/5 to increase tolerance for ADL and work activities.   3. Pt to be independent with HEP to improve ROM and independence with ADL's  4. Patient will ambulate with normal gait pattern using rollator    Anticipated barriers to physical therapy: Attitude toward therapy  Pt's spiritual, cultural and educational needs considered and pt agreeable to plan of care and goals        Plan   Continue with established Plan of Care towards PT goals.              Marlyn Goodman, PT

## 2017-07-07 ENCOUNTER — CLINICAL SUPPORT (OUTPATIENT)
Dept: REHABILITATION | Facility: HOSPITAL | Age: 72
End: 2017-07-07
Attending: INTERNAL MEDICINE
Payer: MEDICARE

## 2017-07-07 DIAGNOSIS — R26.2 DIFFICULTY WALKING: ICD-10-CM

## 2017-07-07 DIAGNOSIS — R53.81 DEBILITY: ICD-10-CM

## 2017-07-07 DIAGNOSIS — R29.898 WEAKNESS OF BOTH LOWER EXTREMITIES: ICD-10-CM

## 2017-07-07 PROCEDURE — 97110 THERAPEUTIC EXERCISES: CPT | Performed by: PHYSICAL MEDICINE & REHABILITATION

## 2017-07-07 NOTE — PROGRESS NOTES
"Name: Goyo Kong [place]  Clinic Number: [id number]  Diagnosis:   Encounter Diagnoses   Name Primary?    Weakness of both lower extremities     Debility     Difficulty walking      Physician: Chrissy Hayes MD  Treatment Orders: PT Eval and Treat  Past Medical History:   Diagnosis Date    Acute anterior myocardial infarction 7/10/2007    s/p PTCA    CVA (cerebral infarction)     no deficits    Hypertension     Osteoarthritis     Stroke        Precautions: None  Visit # authorized:  on 2017  Authorization period: 17  Plan of care expiration: 17    Start time: 9:05 AM  Stop Time: 9:50 AM    Timed     Procedure  Min     Units   TE x 3 45 3                    Untimed     Procedure  Min  Units                      Subjective     Pt reports: has been  Performing  HEP "some at home". No c/o pain today    Pain Scale: before treatment: 0/10 currently; after treatment: 0/10    Objective         TREATMENT  Therapeutic exercise: Goyo Kong received therapeutic exercises to develop ROM, strength, and flexibility for 45 minutes includin:1 with PT    Date 17   Visit 4/12 3/12 2/12 1/12   Gcode Visits  4/10 3/10 2/10 1/10   POC Exp Date 17   Medicare Charge $96.00 $96.00 $128.00 $108.00   Medicare Total $1656.00 $1560.00 $1464.00 $1336.00   Face to Face 17            Seated        HSS/GSS w/ strap 10 x 10" 10 x 10" 10 x 10"    Glut Sets 20 x 5" 20 x 5" 20 x 5"  10 x 5"   Marching 2 x 10 2 x 10 2 x 10 1 x 10   Hip Abd 2 x 10 RTB 2 x 10 RTB 2 x 10 RTB  1 x 10 RTB   Hip Add 20 x 3" w/ ball 20 x 3" w/ ball 20 x 3" w/ ball  10 x 3" w/ pillow   LAQ (B) 2 x 10 1 x 15 1 x 15  1 x 10   Heel raises 1 x 15 1 x 15 1 x 15     Toe raises 1 x 15 1 x 15 1 x 15     Sit-to-stand 1 x 5 1 x 5 1 x 5     Standing:       Marching // 1 x 20 1 x 20 1 x 20    Heel raises // 1 x 15 1 x 15 1 x 15    Hip abduction // 1 x 10 1 x 10 1 x 10            Initials " VK VK GWA 1/6 VK          Written Home Exercises Provided: Continue with current HEP  Pt demo good understanding of the education provided. Goyo Kong demonstrated good return demonstration of activities.     Pt. education:  · Posture reeducation, body mechanics, HEP,   · No spiritual or educational barriers to learning provided  · Pt has no cultural, educational or language barriers to learning provided.    Assessment     Patient performed above exercise program and patient requires constant  verbal cueing for proper technique. Worked with patient to lift his feet with toe off when ambulating to improve gait pattern and to not shuffle his feet. Patient instructed to continue with HEP.  Pt will continue to benefit from skilled outpatient physical therapy to address the remaining functional deficits, provide pt/family education, and to maximize pt's level of independence in the home and community environment. .     Goals:   Short Term Goals:  3 weeks  1.  Increased MMT for BLE to 4/5 to increase endurance with walking and standing   2. Pt. to demonstrate increased MMT for gluteus medius to 4/5  to increase stability during community ambulation  3.  Increased MMT for hip flexor to 4/5 to increase ability to perform sit to stand transfer   4. Pt to tolerate HEP to improve ROM and independence with ADL's  5. Patient will ambulate with improved gait pattern using rollator    Long Term Goals: 6 weeks  1. . Pt. to demonstrate increased MMT for gluteus medius to 4+/5  to increase stability during ambulation on uneven surfaces.  2. Increased MMT for hip flexor to 4+/5 to increase tolerance for ADL and work activities.   3. Pt to be independent with HEP to improve ROM and independence with ADL's  4. Patient will ambulate with normal gait pattern using rollator    Anticipated barriers to physical therapy: Attitude toward therapy  Pt's spiritual, cultural and educational needs considered and pt agreeable to plan of care and  goals        Plan   Continue with established Plan of Care towards PT goals.              Marlyn Goodman, PT

## 2017-07-12 ENCOUNTER — CLINICAL SUPPORT (OUTPATIENT)
Dept: REHABILITATION | Facility: HOSPITAL | Age: 72
End: 2017-07-12
Attending: INTERNAL MEDICINE
Payer: MEDICARE

## 2017-07-12 DIAGNOSIS — R53.81 DEBILITY: ICD-10-CM

## 2017-07-12 DIAGNOSIS — R29.898 WEAKNESS OF BOTH LOWER EXTREMITIES: ICD-10-CM

## 2017-07-12 DIAGNOSIS — R26.2 DIFFICULTY WALKING: ICD-10-CM

## 2017-07-12 PROCEDURE — 97110 THERAPEUTIC EXERCISES: CPT | Performed by: PHYSICAL MEDICINE & REHABILITATION

## 2017-07-12 NOTE — PROGRESS NOTES
"Name: Goyo Kong [place]  Clinic Number: [id number]  Diagnosis:   Encounter Diagnoses   Name Primary?    Weakness of both lower extremities     Debility     Difficulty walking      Physician: Chrissy Hayes MD  Treatment Orders: PT Eval and Treat  Past Medical History:   Diagnosis Date    Acute anterior myocardial infarction 7/10/2007    s/p PTCA    CVA (cerebral infarction)     no deficits    Hypertension     Osteoarthritis     Stroke        Precautions: None  Visit # authorized:  on 2017  Authorization period: 17  Plan of care expiration: 17    Start time: 8:35 AM  Stop Time:  9:20 AM    Timed     Procedure  Min     Units   TE x 3 45 3                    Untimed     Procedure  Min  Units                      Subjective     Pt reports:  No c/o pain today    Pain Scale: before treatment: 0/10 currently; after treatment: 0/10    Objective         TREATMENT  Therapeutic exercise: Goyo Kong received therapeutic exercises to develop ROM, strength, and flexibility for 45 minutes includin:1 with PT    Date 17   Visit 5/12 4/12 3/12 2/12 1/12   Gcode Visits  5/10 4/10 3/10 2/10 1/10   POC Exp Date 17   Medicare Charge $96.00 $96.00 $96.00 $128.00 $108.00   Medicare Total $1852 $1656.00 $1560.00 $1464.00 $1336.00   Face to Face 17             Seated         HSS/GSS w/ strap 10 x 10" 10 x 10" 10 x 10" 10 x 10"    Glut Sets 20 x 5" 20 x 5" 20 x 5" 20 x 5"  10 x 5"   Marching 2 x 10 2 x 10 2 x 10 2 x 10 1 x 10   Hip Abd 2 x 10 RTB 2 x 10 RTB 2 x 10 RTB 2 x 10 RTB  1 x 10 RTB   Hip Add 20 x 3" w/ ball 20 x 3" w/ ball 20 x 3" w/ ball 20 x 3" w/ ball  10 x 3" w/ pillow   LAQ (B) 2 x 10 2 x 10 1 x 15 1 x 15  1 x 10   Heel raises 2 x 10 1 x 15 1 x 15 1 x 15     Toe raises 2 x 10 1 x 15 1 x 15 1 x 15     Sit-to-stand 1 x 5 1 x 5 1 x 5 1 x 5     Standing:        Marching // 1  X 20 1 x 20 1 x 20 1 x 20  "   Heel raises // 1 x 20 1 x 15 1 x 15 1 x 15    Hip abduction // 1 x 10 1 x 10 1 x 10 1 x 10             Initials VK VK VK GWA 1/6 VK      FOTO Score:  10/100    Written Home Exercises Provided: Continue with current HEP  Pt demo good understanding of the education provided. Goyo Kong demonstrated good return demonstration of activities.     Pt. education:  · Posture reeducation, body mechanics, HEP,   · No spiritual or educational barriers to learning provided  · Pt has no cultural, educational or language barriers to learning provided.    Assessment     Patient performed above exercise program and patient requires constant  verbal cueing for proper technique. Worked with patient to lift his feet with toe off when ambulating to improve gait pattern and to not shuffle his feet. Patient instructed to continue with HEP.  Pt will continue to benefit from skilled outpatient physical therapy to address the remaining functional deficits, provide pt/family education, and to maximize pt's level of independence in the home and community environment. .     Goals:   Short Term Goals:  3 weeks  1.  Increased MMT for BLE to 4/5 to increase endurance with walking and standing   2. Pt. to demonstrate increased MMT for gluteus medius to 4/5  to increase stability during community ambulation  3.  Increased MMT for hip flexor to 4/5 to increase ability to perform sit to stand transfer   4. Pt to tolerate HEP to improve ROM and independence with ADL's  5. Patient will ambulate with improved gait pattern using rollator    Long Term Goals: 6 weeks  1. . Pt. to demonstrate increased MMT for gluteus medius to 4+/5  to increase stability during ambulation on uneven surfaces.  2. Increased MMT for hip flexor to 4+/5 to increase tolerance for ADL and work activities.   3. Pt to be independent with HEP to improve ROM and independence with ADL's  4. Patient will ambulate with normal gait pattern using rollator    Anticipated barriers to  physical therapy: Attitude toward therapy  Pt's spiritual, cultural and educational needs considered and pt agreeable to plan of care and goals        Plan   Continue with established Plan of Care towards PT goals.              Marlyn Goodman, PT

## 2017-07-14 ENCOUNTER — CLINICAL SUPPORT (OUTPATIENT)
Dept: REHABILITATION | Facility: HOSPITAL | Age: 72
End: 2017-07-14
Attending: INTERNAL MEDICINE
Payer: MEDICARE

## 2017-07-14 DIAGNOSIS — R29.898 WEAKNESS OF BOTH LOWER EXTREMITIES: ICD-10-CM

## 2017-07-14 DIAGNOSIS — R53.81 DEBILITY: ICD-10-CM

## 2017-07-14 DIAGNOSIS — R26.2 DIFFICULTY WALKING: ICD-10-CM

## 2017-07-14 PROCEDURE — 97110 THERAPEUTIC EXERCISES: CPT | Performed by: PHYSICAL MEDICINE & REHABILITATION

## 2017-07-14 NOTE — PROGRESS NOTES
"Name: Goyo Kong [place]  Clinic Number: [id number]  Diagnosis:   Encounter Diagnoses   Name Primary?    Weakness of both lower extremities     Debility     Difficulty walking      Physician: Chrissy Hayes MD  Treatment Orders: PT Eval and Treat  Past Medical History:   Diagnosis Date    Acute anterior myocardial infarction 7/10/2007    s/p PTCA    CVA (cerebral infarction)     no deficits    Hypertension     Osteoarthritis     Stroke        Precautions: None  Visit # authorized:  on 2017  Authorization period: 17  Plan of care expiration: 17    Start time: 8:00 AM  Stop Time:  8:45  AM    Timed     Procedure  Min     Units   TE x 3 45 3                    Untimed     Procedure  Min  Units                      Subjective     Pt reports:  No c/o pain today.    Pain Scale: before treatment: 0/10 currently; after treatment: 0/10    Objective         TREATMENT  Therapeutic exercise: Goyo Kong received therapeutic exercises to develop ROM, strength, and flexibility for 45 minutes includin:1 with PT    Date 17   Visit 6/12 5/12 4/12 3/12 2/12 1/12   Gcode Visits  6/10 5/10 4/10 3/10 2/10 1/10   POC Exp Date 17   Medicare Charge $96.00 $96.00 $96.00 $96.00 $128.00 $108.00   Medicare Total $1948.00 $1852.00 $1656.00 $1560.00 $1464.00 $1336.00   Face to Face 17              Seated          HSS/GSS w/ strap 10 x 10" 10 x 10" 10 x 10" 10 x 10" 10 x 10"    Glut Sets 20 x 5" 20 x 5" 20 x 5" 20 x 5" 20 x 5"  10 x 5"   Marching 2 x 10 2 x 10 2 x 10 2 x 10 2 x 10 1 x 10   Hip Abd 2 x 10 RTB 2 x 10 RTB 2 x 10 RTB 2 x 10 RTB 2 x 10 RTB  1 x 10 RTB   Hip Add 2 x 3" w/ ball 20 x 3" w/ ball 20 x 3" w/ ball 20 x 3" w/ ball 20 x 3" w/ ball  10 x 3" w/ pillow   LAQ (B) 2 x 10 2 x 10 2 x 10 1 x 15 1 x 15  1 x 10   Heel raises 2 x 10 2 x 10 1 x 15 1 x 15 1 x 15     Toe raises 2 x 10 2 x 10 " 1 x 15 1 x 15 1 x 15     Sit-to-stand 1 x 5 1 x 5 1 x 5 1 x 5 1 x 5     Standing:         Marching // 1 x 20 1  X 20 1 x 20 1 x 20 1 x 20    Heel raises // 1 x 20 1 x 20 1 x 15 1 x 15 1 x 15    Hip abduction // 1 x 10 1 x 10 1 x 10 1 x 10 1 x 10              Initials VK VK VK VK GWA 1/6 VK      FOTO Score:  10/100    Written Home Exercises Provided: Continue with current HEP  Pt demo good understanding of the education provided. Goyo Kong demonstrated good return demonstration of activities.     Pt. education:  · Posture reeducation, body mechanics, HEP,   · No spiritual or educational barriers to learning provided  · Pt has no cultural, educational or language barriers to learning provided.    Assessment     Patient performed above exercise program and patient requires constant  verbal cueing for proper technique. Worked with patient to lift his feet with toe off when ambulating to improve gait pattern and to not shuffle his feet. Patient instructed to continue with HEP.  Pt will continue to benefit from skilled outpatient physical therapy to address the remaining functional deficits, provide pt/family education, and to maximize pt's level of independence in the home and community environment. .     Goals:   Short Term Goals:  3 weeks  1.  Increased MMT for BLE to 4/5 to increase endurance with walking and standing   2. Pt. to demonstrate increased MMT for gluteus medius to 4/5  to increase stability during community ambulation  3.  Increased MMT for hip flexor to 4/5 to increase ability to perform sit to stand transfer   4. Pt to tolerate HEP to improve ROM and independence with ADL's  5. Patient will ambulate with improved gait pattern using rollator    Long Term Goals: 6 weeks  1. . Pt. to demonstrate increased MMT for gluteus medius to 4+/5  to increase stability during ambulation on uneven surfaces.  2. Increased MMT for hip flexor to 4+/5 to increase tolerance for ADL and work activities.   3. Pt to be  independent with HEP to improve ROM and independence with ADL's  4. Patient will ambulate with normal gait pattern using rollator    Anticipated barriers to physical therapy: Attitude toward therapy  Pt's spiritual, cultural and educational needs considered and pt agreeable to plan of care and goals        Plan   Continue with established Plan of Care towards PT goals. Will reassess next visit for discharge as patient reaching Medicare cap.              Marlyn Goodman, PT

## 2017-07-19 ENCOUNTER — CLINICAL SUPPORT (OUTPATIENT)
Dept: REHABILITATION | Facility: HOSPITAL | Age: 72
End: 2017-07-19
Attending: INTERNAL MEDICINE
Payer: MEDICARE

## 2017-07-19 DIAGNOSIS — R29.898 WEAKNESS OF BOTH LOWER EXTREMITIES: ICD-10-CM

## 2017-07-19 DIAGNOSIS — R53.81 DEBILITY: ICD-10-CM

## 2017-07-19 DIAGNOSIS — R26.2 DIFFICULTY WALKING: ICD-10-CM

## 2017-07-19 PROCEDURE — G8979 MOBILITY GOAL STATUS: HCPCS | Mod: CK | Performed by: PHYSICAL MEDICINE & REHABILITATION

## 2017-07-19 PROCEDURE — 97110 THERAPEUTIC EXERCISES: CPT | Performed by: PHYSICAL MEDICINE & REHABILITATION

## 2017-07-19 PROCEDURE — G8980 MOBILITY D/C STATUS: HCPCS | Mod: CM | Performed by: PHYSICAL MEDICINE & REHABILITATION

## 2017-07-19 NOTE — PROGRESS NOTES
"Name: Goyo Kong [place]  Clinic Number: [id number]  Diagnosis:   Encounter Diagnoses   Name Primary?    Weakness of both lower extremities     Debility     Difficulty walking      Physician: Chrissy Hayes MD  Treatment Orders: PT Eval and Treat  Past Medical History:   Diagnosis Date    Acute anterior myocardial infarction 7/10/2007    s/p PTCA    CVA (cerebral infarction)     no deficits    Hypertension     Osteoarthritis     Stroke        Precautions: None  Visit # authorized:  on 2017  Authorization period: 17  Plan of care expiration: 17    Start time: 8:15 AM  Stop Time:  8:45  AM    Timed     Procedure  Min     Units   TE x 2 30 2                    Untimed     Procedure  Min  Units                      Subjective     Pt reports:  No c/o pain today. Patient states performs "some" of HEP.    Pain Scale: before treatment: 0/10 currently; after treatment: 0/10    Objective                                                                                        DISCHARGE SUMMARY     17  Lower Extremity Strength: (graded 1-5 out of 5)    RLE LLE   Hip flexion:  3/5 3/5   Hip Abduction 3/5 3/5   Hip Extension 3/5 3/5   Knee extension: 4/5 4/5   Ankle DF: 4+/5 4+/5   Ankle IV 4+/5 4+/5   Ankle EV 4+/5 4+/5   PF 4+/5 4+/5   Knee Flexion 4/5 4/5     GAIT: Goyo Kong ambulates using rollator with slow steps and short stride. Patient tends to drag both feet during toe off. Will pick feet up with toe off with verbal cueing    30 Second Chair Stand: Score: 0 as patient uses both hands to push up from sitting  TU:00 minutes  Patient in high risk for falls category    17  Lower Extremity Strength: (graded 1-5 out of 5)    RLE LLE   Hip flexion:  3/5 3/5   Hip Abduction 3/5 3/5   Hip Extension 3/5 3/5   Knee extension: 4/5 4/5   Ankle DF: 4+/5 4+/5   Ankle IV 4+/5 4+/5   Ankle EV 4+/5 4+/5   PF 4+/5 4+/5   Knee Flexion 4/5 4/5     GAIT: Goyo Kong ambulates using rollator with slow steps " "and short stride. Patient tends to drag both feet during toe off.    30 Second Chair Stand: Score: 0 as patient uses both hands to push up from sitting  TU:00 minutes  Patient in high risk for falls category    TREATMENT  Therapeutic exercise: Goyo Kong received therapeutic exercises to develop ROM, strength, and flexibility for 30 minutes includin:1 with PT    Date 17   Visit 7/12 6/12 5/12 4/12 3/12 2/12 1/12   Gcode Visits  7/10 6/10 5/10 4/10 3/10 2/10 1/10   POC Exp Date 17   Medicare Charge $64.00 $96.00 $96.00 $96.00 $96.00 $128.00 $108.00   Medicare Total $2002.00 $1948.00 $1852.00 $1656.00 $1560.00 $1464.00 $1336.00   Face to Face 17               Seated           HSS/GSS w/ strap 10 x 10" 10 x 10" 10 x 10" 10 x 10" 10 x 10" 10 x 10"    Glut Sets 20 x 5" 20 x 5" 20 x 5" 20 x 5" 20 x 5" 20 x 5"  10 x 5"   Marching 2 x 10 2 x 10 2 x 10 2 x 10 2 x 10 2 x 10 1 x 10   Hip Abd 2 x 10 RTB 2 x 10 RTB 2 x 10 RTB 2 x 10 RTB 2 x 10 RTB 2 x 10 RTB  1 x 10 RTB   Hip Add 2 x 10 w/ ball 2 x 3" w/ ball 20 x 3" w/ ball 20 x 3" w/ ball 20 x 3" w/ ball 20 x 3" w/ ball  10 x 3" w/ pillow   LAQ (B)  2 x 10 2 x 10 2 x 10 1 x 15 1 x 15  1 x 10   Heel raises 2 x 10 2 x 10 2 x 10 1 x 15 1 x 15 1 x 15     Toe raises 2 x 10 2 x 10 2 x 10 1 x 15 1 x 15 1 x 15     Sit-to-stand -- 1 x 5 1 x 5 1 x 5 1 x 5 1 x 5     Standing:          Marching // -- 1 x 20 1  X 20 1 x 20 1 x 20 1 x 20    Heel raises // -- 1 x 20 1 x 20 1 x 15 1 x 15 1 x 15    Hip abduction // -- 1 x 10 1 x 10 1 x 10 1 x 10 1 x 10               Initials VK VK VK VK VK GWA  VK      Functional Status Measures:  Mobility    Intake Score:      G-code current status:    CM at least 80%, but < 100% impaired, limited or restricted    G-code goal status:         CK at least 40% but < 60% impaired, limited or " restricted     G-code discharge status: CM at least 80%, but < 100% impaired, limited or restricted        Written Home Exercises Provided: Continue with current HEP  Pt demo good understanding of the education provided. Goyo Kong demonstrated good return demonstration of activities.     Pt. education:  · Posture reeducation, body mechanics, HEP,   · No spiritual or educational barriers to learning provided  · Pt has no cultural, educational or language barriers to learning provided.    Assessment     Reassessment for end of Medicare cap and for discharge. Strength of BLE's and balance tests remains the same as initial evaluation. Reviewed above exercises with hope of patient continuing on HEP and patient strongly encouraged to continue wit HEP.. However, patient admits to not being compliant with HEP and requires constant verbal cueing to perform all exercises. Goals not met with patient .  Goals:   Short Term Goals:  3 weeks  Not Met  1.  Increased MMT for BLE to 4/5 to increase endurance with walking and standing   2. Pt. to demonstrate increased MMT for gluteus medius to 4/5  to increase stability during community ambulation  3.  Increased MMT for hip flexor to 4/5 to increase ability to perform sit to stand transfer   4. Pt to tolerate HEP to improve ROM and independence with ADL's  5. Patient will ambulate with improved gait pattern using rollator    Long Term Goals: 6 weeks  Not Met  1. . Pt. to demonstrate increased MMT for gluteus medius to 4+/5  to increase stability during ambulation on uneven surfaces.  2. Increased MMT for hip flexor to 4+/5 to increase tolerance for ADL and work activities.   3. Pt to be independent with HEP to improve ROM and independence with ADL's  4. Patient will ambulate with normal gait pattern using rollator    Anticipated barriers to physical therapy: Attitude toward therapy  Pt's spiritual, cultural and educational needs considered and pt agreeable to plan of care  and goals        Plan   .              Marlyn Goodman, PT

## 2017-10-26 ENCOUNTER — CLINICAL SUPPORT (OUTPATIENT)
Dept: SMOKING CESSATION | Facility: CLINIC | Age: 72
End: 2017-10-26
Payer: COMMERCIAL

## 2017-10-26 DIAGNOSIS — F17.200 NICOTINE DEPENDENCE: Primary | ICD-10-CM

## 2017-10-26 PROCEDURE — 99404 PREV MED CNSL INDIV APPRX 60: CPT | Mod: S$GLB,,,

## 2017-10-26 RX ORDER — DM/P-EPHED/ACETAMINOPH/DOXYLAM 30-7.5/3
2 LIQUID (ML) ORAL
Qty: 144 LOZENGE | Refills: 0 | Status: ON HOLD | OUTPATIENT
Start: 2017-10-26 | End: 2018-03-20

## 2017-11-06 ENCOUNTER — CLINICAL SUPPORT (OUTPATIENT)
Dept: SMOKING CESSATION | Facility: CLINIC | Age: 72
End: 2017-11-06
Payer: COMMERCIAL

## 2017-11-06 DIAGNOSIS — F17.200 NICOTINE DEPENDENCE: Primary | ICD-10-CM

## 2017-11-06 PROCEDURE — 99406 BEHAV CHNG SMOKING 3-10 MIN: CPT | Mod: S$GLB,,,

## 2017-11-08 ENCOUNTER — CLINICAL SUPPORT (OUTPATIENT)
Dept: SMOKING CESSATION | Facility: CLINIC | Age: 72
End: 2017-11-08
Payer: COMMERCIAL

## 2017-11-08 DIAGNOSIS — F17.200 NICOTINE DEPENDENCE: Primary | ICD-10-CM

## 2017-11-08 PROCEDURE — 90853 GROUP PSYCHOTHERAPY: CPT | Mod: S$GLB,,,

## 2017-11-08 NOTE — Clinical Note
Active participation, self-disclosure, supportive of group and peers. Pt is tobacco free as of 10/26. Pt remains on tobacco cessation medication of 2mg Nicotine lozenges on as needed.  No problems or adverse effects noted at this time. Congratulated him on his great success and encouraged him to keep up the great work. Pt's exhaled carbon monoxide level was 3 ppm per smokerlyzer (0-6 ppm non-smoker). Will see pt back in group 2 in 1 week.

## 2017-11-08 NOTE — PROGRESS NOTES
Smoking Cessation Group Session #1    Site: [facility]  Date:  11/8/2017  Clinical Status of Patient: Outpatient   Length of Service and Code: 90 minutes - 26784   Number in Attendance: 6  Group Activities/Focus of Group:  orientation, client introductions, completion of TCRS (Tobacco Cessation Rating Scale) learned addiction model, cues/triggers, personal reasons for quitting, medications, goals, quit date    Target symptoms:  withdrawal and medication side effects             The following were rated moderate (3) to severe (4) on TCRS:       Moderate 3: None     Severe 4:   None  Patient's Response to Intervention: Active participation, self-disclosure, supportive of group and peers. Pt is tobacco free as of 10/26. Pt remains on tobacco cessation medication of 2mg Nicotine lozenges on as needed.  No problems or adverse effects noted at this time. Congratulated him on his great success and encouraged him to keep up the great work. Pt's exhaled carbon monoxide level was 3 ppm per smokerlyzer (0-6 ppm non-smoker). Will see pt back in group 2 in 1 week.    Progress Toward Goals and Other Mental Status Changes: The patient denies any abnormal behavioral or mental changes at this time.   I  Diagnosis: Z72.0  Plan: The patient will continue with group therapy sessions and medication regimen prescribed with management by physician or by the Cessation Clinic Provider. Patient will inform Smoking Cessation Counselor of symptoms as rated high on TCRS.    Return to Clinic: 1 week    Quit Date: 10/26/17

## 2017-11-15 ENCOUNTER — CLINICAL SUPPORT (OUTPATIENT)
Dept: SMOKING CESSATION | Facility: CLINIC | Age: 72
End: 2017-11-15
Payer: COMMERCIAL

## 2017-11-15 DIAGNOSIS — F17.200 NICOTINE DEPENDENCE: Primary | ICD-10-CM

## 2017-11-15 PROCEDURE — 90853 GROUP PSYCHOTHERAPY: CPT | Mod: S$GLB,,,

## 2017-11-15 NOTE — Clinical Note
Active participation, self-disclosure, supportive of group and peers. Pt remains tobacco free. Pt remains on tobacco cessation medication of 2mg Nicotine lozenges 2-3 times a day as needed.  No problems or adverse effects noted at this time. Reinforced his quit and congratulated him on his great success and encouraged him to keep up the great work. Pt's exhaled carbon monoxide level was 3 ppm per smokerlyzer (0-6 ppm non-smoker). Will see pt back in group 2 in 2 weeks.

## 2017-11-16 NOTE — PROGRESS NOTES
Smoking Cessation Group Session #2    Site: [facility]  Date:  11/15/17  Clinical Status of Patient: Outpatient   Length of Service and Code: 90 minutes - 87571   Number in Attendance: 8  Group Activities/Focus of Group: completion of TCRS (Tobacco Cessation Rating Scale) reviewed strategies, cues, and triggers. Introduced the negative impact of tobacco on health, the health advantages of discontinuing the use of tobacco, time line improved health changes after a quit, withdrawal issues to expect from nicotine and habit, and ways to achieve the goal of a quit.   Target symptoms:  withdrawal and medication side effects.             The following were rated moderate (3) to severe (4) on TCRS:       Moderate 3: none     Severe 4:   none  Patient's Response to Intervention:Active participation, self-disclosure, supportive of group and peers. Pt remains tobacco free. Pt remains on tobacco cessation medication of 2mg Nicotine lozenges 2-3 times a day as needed.  No problems or adverse effects noted at this time. Reinforced his quit and congratulated him on his great success and encouraged him to keep up the great work. Pt's exhaled carbon monoxide level was 3 ppm per smokerlyzer (0-6 ppm non-smoker). Will see pt back in group 2 in 2 weeks.  Progress Toward Goals and Other Mental Status Changes: The patient denies any abnormal behavioral or mental changes at this time.   Diagnosis: Z72.0  Plan: The patient will continue with group therapy sessions and medication regimen prescribed with management by physician or by the Cessation Clinic Provider. Patient will inform Smoking Cessation Counselor of symptoms as rated high on TCRS.    Return to Clinic: 2 weeks    Quit Date: 10/26/17

## 2017-11-29 ENCOUNTER — CLINICAL SUPPORT (OUTPATIENT)
Dept: SMOKING CESSATION | Facility: CLINIC | Age: 72
End: 2017-11-29
Payer: COMMERCIAL

## 2017-11-29 DIAGNOSIS — F17.200 NICOTINE DEPENDENCE: Primary | ICD-10-CM

## 2017-11-29 PROCEDURE — 90853 GROUP PSYCHOTHERAPY: CPT | Mod: S$GLB,,,

## 2017-11-29 NOTE — Clinical Note
Active participation. Pt is tobacco free as of 10/26. Pt remains on tobacco cessation medication of 2mg Nicotine lozenges on as needed.  No problems or adverse effects noted at this time. Continued to congratulated him on his great success and encouraged him to keep up the great work. Pt's exhaled carbon monoxide level was 2 ppm per smokerlyzer (0-6 ppm non-smoker). Will see pt back in group 4 in 1 week.

## 2017-11-30 NOTE — PROGRESS NOTES
Smoking Cessation Group Session #3    Site: [facility]  Date:  11/29/17  Clinical Status of Patient: Outpatient   Length of Service and Code: 90 minutes - 87981   Number in Attendance: 6  Group Activities/Focus of Group:completion of TCRS (Tobacco Cessation Rating Scale) reviewed strategies, controlling environment, cues, triggers, new goals set. Introduced high risk situations with preparation interventions, caffeine similarities with withdrawal issues of habit and nicotine, Alcohol, Understanding urges, cravings, stress and relaxation. Open discussion with intervention discussion.      Target symptoms:  withdrawal and medication side effects             The following were rated moderate (3) to severe (4) on TCRS:       Moderate 3: none      Severe 4:   none  Patient's Response to Intervention: Active participation. Pt is tobacco free as of 10/26. Pt remains on tobacco cessation medication of 2mg Nicotine lozenges on as needed.  No problems or adverse effects noted at this time. Continued to congratulated him on his great success and encouraged him to keep up the great work. Pt's exhaled carbon monoxide level was 2 ppm per smokerlyzer (0-6 ppm non-smoker). Will see pt back in group 4 in 1 week.  Progress Toward Goals and Other Mental Status Changes: The patient denies any abnormal behavioral or mental changes at this time.       Diagnosis: Z72.0  Plan: The patient will continue with group therapy sessions and medication regimen prescribed with management by physician or by the Cessation Clinic Provider. Patient will inform Smoking Cessation Counselor of symptoms as rated high on TCRS.    Return to Clinic: 1 week    Quit Date: 10/26/17

## 2017-12-06 ENCOUNTER — CLINICAL SUPPORT (OUTPATIENT)
Dept: SMOKING CESSATION | Facility: CLINIC | Age: 72
End: 2017-12-06
Payer: COMMERCIAL

## 2017-12-06 DIAGNOSIS — F17.200 NICOTINE DEPENDENCE: Primary | ICD-10-CM

## 2017-12-06 PROCEDURE — 90853 GROUP PSYCHOTHERAPY: CPT | Mod: S$GLB,,,

## 2017-12-06 NOTE — Clinical Note
Active participation. Pt is tobacco free as of 10/26. Pt remains on tobacco cessation medication of 2mg Nicotine lozenges on as needed.  No problems or adverse effects noted at this time. Continued to congratulated him on his great success and encouraged him to keep up the great work. Pt's exhaled carbon monoxide level was 1 ppm per smokerlyzer (0-6 ppm non-smoker). Will see pt back in group 5 in 1 week.

## 2017-12-07 NOTE — PROGRESS NOTES
Smoking Cessation Group Session #4    Site: [facility]  Date:  12/6/17  Clinical Status of Patient: Outpatient   Length of Service and Code: 90 minutes - 79765   Number in Attendance: 9  Group Activities/Focus of Group:Completion of TCRS (Tobacco Cessation Rating Scale) reviewed strategies, habitual behavior, stress, and high risk situations. Introduced stress with addition interventions, solve, relaxation with interventions, nutrition, exercise, weight gain, and the importance of rewarding oneself for accomplishments toward becoming tobacco free. Open discussion of all items with interventions.    Target symptoms:  withdrawal and medication side effects             The following were rated moderate (3) to severe (4) on TCRS:       Moderate 3: none     Severe 4:   none  Patient's Response to Intervention: Active participation. Pt is tobacco free as of 10/26. Pt remains on tobacco cessation medication of 2mg Nicotine lozenges on as needed.  No problems or adverse effects noted at this time. Continued to congratulated him on his great success and encouraged him to keep up the great work. Pt's exhaled carbon monoxide level was 1 ppm per smokerlyzer (0-6 ppm non-smoker). Will see pt back in group 5 in 1 week.  Progress Toward Goals and Other Mental Status Changes: The patient denies any abnormal behavioral or mental changes at this time.     Diagnosis: Z72.0  Plan: The patient will continue with group therapy sessions and medication regimen prescribed with management by physician or by the Cessation Clinic Provider. Patient will inform Smoking Cessation Counselor of symptoms as rated high on TCRS.    Return to Clinic: 1 week    Quit Date: 10/26

## 2017-12-13 ENCOUNTER — CLINICAL SUPPORT (OUTPATIENT)
Dept: SMOKING CESSATION | Facility: CLINIC | Age: 72
End: 2017-12-13
Payer: COMMERCIAL

## 2017-12-13 DIAGNOSIS — F17.200 NICOTINE DEPENDENCE: Primary | ICD-10-CM

## 2017-12-13 PROCEDURE — 90853 GROUP PSYCHOTHERAPY: CPT | Mod: S$GLB,,,

## 2017-12-13 NOTE — PROGRESS NOTES
Smoking Cessation Group Session #5    Site: [facility]  Date:  12/13/2017  Clinical Status of Patient: Outpatient   Length of Service and Code: 90 minutes - 38922   Number in Attendance: 7  Group Activities/Focus of Group:  Completion of TCRS (Tobacco Cessation Rating Scale) reviewed strategies, cues, triggers, high risk situations, lapses, relapses, diet, exercise, stress, relaxation, sleep, habitual behavior, and life style changes  Target symptoms:  withdrawal and medication side effects             The following were rated moderate (3) to severe (4) on TCRS:       Moderate 3: None      Severe 4:   None     Patient's Response to Intervention: Active participation. Pt is tobacco free as of 10/26. Pt remains on tobacco cessation medication of 2mg Nicotine lozenges on as needed.  No problems or adverse effects noted at this time. Continued to congratulated him on his great success and encouraged him to keep up the great work. Pt's exhaled carbon monoxide level was 3 ppm per smokerlyzer (0-6 ppm non-smoker). Will see pt back in group session  6 in 1 week.   Progress Toward Goals and Other Mental Status Changes: The patient denies any abnormal behavioral or mental changes at this time.       Diagnosis: Z72.0  Plan: The patient will continue with group therapy sessions and medication regimen prescribed with management by physician or by the Cessation Clinic Provider. Patient will inform Smoking Cessation Counselor of symptoms as rated high on TCRS.    Return to Clinic: 1 week    Quit Date: 10/26

## 2017-12-13 NOTE — Clinical Note
Active participation. Pt is tobacco free as of 10/26. Pt remains on tobacco cessation medication of 2mg Nicotine lozenges on as needed.  No problems or adverse effects noted at this time. Continued to congratulated him on his great success and encouraged him to keep up the great work. Pt's exhaled carbon monoxide level was 3 ppm per smokerlyzer (0-6 ppm non-smoker). Will see pt back in group session 6 in 1 week.

## 2017-12-20 ENCOUNTER — CLINICAL SUPPORT (OUTPATIENT)
Dept: SMOKING CESSATION | Facility: CLINIC | Age: 72
End: 2017-12-20
Payer: COMMERCIAL

## 2017-12-20 DIAGNOSIS — F17.200 NICOTINE DEPENDENCE: Primary | ICD-10-CM

## 2017-12-20 PROCEDURE — 90853 GROUP PSYCHOTHERAPY: CPT | Mod: S$GLB,,,

## 2017-12-20 NOTE — Clinical Note
Patient continues to be tobacco free. Congratulated him for his success and quit and awarded  a certificate for his accomplishment. He was so excited to receive the award that he teared up. Patient is not using any lozenges at this time. Pt's exhaled carbon monoxide level was 1 ppm per smokerlyzer (0-6 ppm non-smoker). Pt will return to group sessions after the holidays in  2 weeks.

## 2017-12-21 NOTE — PROGRESS NOTES
Smoking Cessation Group Session #6    Site: [facility]  Date:  12/20/17  Clinical Status of Patient: Outpatient   Length of Service and Code: 90 minutes - 87522   Number in Attendance: 8  Group Activities/Focus of Group: completion of TCRS (Tobacco Cessation Rating Scale) reviewed strategies, cues, triggers, high risk situations, lapses, relapses, diet, exercise, stress, relaxation, sleep, habitual behavior, and life style changes.      Target symptoms:  withdrawal and medication side effects             The following were rated moderate (3) to severe (4) on TCRS:       Moderate 3: none     Severe 4:   none  Patient's Response to Intervention: Patient continues to be tobacco free. Congratulated him for his success and quit and awarded  a certificate for his accomplishment. He was so excited to receive the award that he teared up. Patient is not using any lozenges at this time. Pt's exhaled carbon monoxide level was 1 ppm per smokerlyzer (0-6 ppm non-smoker). Pt will return to group sessions after the holidays in  2 weeks.  Progress Toward Goals and Other Mental Status Changes: The patient denies any abnormal behavioral or mental changes at this time.     Diagnosis: Z72.0  Plan: The patient will continue with group therapy sessions and medication regimen prescribed with management by physician or by the Cessation Clinic Provider. Patient will inform Smoking Cessation Counselor of symptoms as rated high on TCRS.    Return to Clinic: 2 weeks    Quit Date: 10/26/17

## 2018-01-03 ENCOUNTER — CLINICAL SUPPORT (OUTPATIENT)
Dept: SMOKING CESSATION | Facility: CLINIC | Age: 73
End: 2018-01-03
Payer: COMMERCIAL

## 2018-01-03 DIAGNOSIS — F17.200 NICOTINE DEPENDENCE: Primary | ICD-10-CM

## 2018-01-03 PROCEDURE — 90853 GROUP PSYCHOTHERAPY: CPT | Mod: S$GLB,,,

## 2018-01-04 NOTE — PROGRESS NOTES
Smoking Cessation Group Session #7    Site: [facility]  Date:  1/3/18  Clinical Status of Patient: Outpatient   Length of Service and Code: 90 minutes - 36067   Number in Attendance: 6  Group Activities/Focus of Group:  Sharing last weeks challenges, triggers, and coping activities to remain quit and/ or keep making progress toward cessation, completion of TCRS (Tobacco Cessation Rating Scale) learned addiction model, personal reasons for quitting, medications, goals, quit date.    Specific session focus: discussed all    Target symptoms:  withdrawal and medication side effects             The following were rated moderate (3) to severe (4) on TCRS:       Moderate 3: none     Severe 4:   none  Patient's Response to Intervention: Patient continues to be tobacco free. Congratulated him for his success his is so proud about his accomplishment.  Patient is not using any lozenges at this time. Pt's exhaled carbon monoxide level was 2 ppm per smokerlyzer (0-6 ppm non-smoker). Pt will return to group sessions 1 week.  Progress Toward Goals and Other Mental Status Changes: The patient denies any abnormal behavioral or mental changes at this time.   Interval History: n/a    Diagnosis: Z72.0  Plan: The patient will continue with group therapy sessions and medication regimen prescribed with management by physician or by the Cessation Clinic Provider. Patient will inform Smoking Cessation Counselor of symptoms as rated high on TCRS.    Return to Clinic: 1 week    Quit Date: 10/26/17

## 2018-01-10 ENCOUNTER — CLINICAL SUPPORT (OUTPATIENT)
Dept: SMOKING CESSATION | Facility: CLINIC | Age: 73
End: 2018-01-10
Payer: COMMERCIAL

## 2018-01-10 DIAGNOSIS — F17.200 NICOTINE DEPENDENCE: Primary | ICD-10-CM

## 2018-01-10 PROCEDURE — 90853 GROUP PSYCHOTHERAPY: CPT | Mod: S$GLB,,,

## 2018-01-10 NOTE — Clinical Note
Patient participated in group therapy. He continues to talk about how proud he is of his quit and the certificate that he received. Patient not on any smoking cessation medication regimen at this time. Patient will return in 1 week for group session. Pt's exhaled carbon monoxide level was 2 ppm per smokerlyzer (0-6 ppm non-smoker).

## 2018-01-10 NOTE — PROGRESS NOTES
Smoking Cessation Group Session #8  Site: [facility]  Date:  1/10/2018  Clinical Status of Patient: Outpatient   Length of Service and Code: 90 minutes - 27107   Number in Attendance: 8  Group Activities/Focus of Group: completion of TCRS (Tobacco Cessation Rating Scale) reviewed strategies, habitual behavior, stress, and high risk situations. Introduced stress with addition interventions, SOLVE, relaxation with interventions, nutrition, exercise, weight gain, and the importance of rewarding oneself for accomplishments toward becoming tobacco free. Open discussion of all items with interventions.     Target symptoms:  withdrawal and medication side effects             The following were rated moderate (3) to severe (4) on TCRS:       Moderate 3: none     Severe 4:   none  Patient's Response to Intervention: Patient participated in group therapy. He continues to talk about how proud he is of his quit and the certificate that he received. Patient not on any smoking cessation medication regimen at this time. Patient will return in 1 week for group session. Pt's exhaled carbon monoxide level was 2 ppm per smokerlyzer (0-6 ppm non-smoker).  Progress Toward Goals and Other Mental Status Changes: The patient denies any abnormal behavioral or mental changes at this time.       Diagnosis: Z72.0  Plan: The patient will continue with group therapy sessions and medication regimen prescribed with management by physician or by the Cessation Clinic Provider. Patient will inform Smoking Cessation Counselor of symptoms as rated high on TCRS.    Return to Clinic: 1 week    Quit Date: 10/26/17/

## 2018-01-17 ENCOUNTER — CLINICAL SUPPORT (OUTPATIENT)
Dept: SMOKING CESSATION | Facility: CLINIC | Age: 73
End: 2018-01-17
Payer: COMMERCIAL

## 2018-01-17 DIAGNOSIS — F17.200 NICOTINE DEPENDENCE: Primary | ICD-10-CM

## 2018-01-17 PROCEDURE — 99406 BEHAV CHNG SMOKING 3-10 MIN: CPT | Mod: S$GLB,,,

## 2018-01-24 ENCOUNTER — CLINICAL SUPPORT (OUTPATIENT)
Dept: SMOKING CESSATION | Facility: CLINIC | Age: 73
End: 2018-01-24
Payer: COMMERCIAL

## 2018-01-24 DIAGNOSIS — F17.200 NICOTINE DEPENDENCE: Primary | ICD-10-CM

## 2018-01-24 PROCEDURE — 90853 GROUP PSYCHOTHERAPY: CPT | Mod: S$GLB,,,

## 2018-01-24 NOTE — PROGRESS NOTES
Smoking Cessation Group Session #5    Site: [facility]  Date:  1/24/2018  Clinical Status of Patient: Outpatient   Length of Service and Code: 90 minutes - 26187   Number in Attendance: 6  Group Activities/Focus of Group:  completion of TCRS (Tobacco Cessation Rating Scale) reviewed strategies, habitual behavior, high risks situations, understanding urges and cravings, stress and relaxation with open discussion and additional interventions, Introduced lapses, relapses, understanding them and analyzing the situation of a lapse, conflict issues that may be linked to a lapse.   Target symptoms:  withdrawal and medication side effects             The following were rated moderate (3) to severe (4) on TCRS:       Moderate 3: none     Severe 4:   none  Patient's Response to Intervention: Patient continues to be tobacco free. Patient stated that he has not used lozenges and is not have any urges or craving, still is group of himself in his quit. Patient will return in 1 week for group session. Pt's exhaled carbon monoxide level was 3 ppm per smokerlyzer (0-6 ppm non-smoker).Patient will be released from program next. He is doing good on his own.   Progress Toward Goals and Other Mental Status Changes: The patient denies any abnormal behavioral or mental changes at this time.       Diagnosis: Z72.0  Plan: The patient will continue with group therapy sessions and medication regimen prescribed with management by physician or by the Cessation Clinic Provider. Patient will inform Smoking Cessation Counselor of symptoms as rated high on TCRS.    Return to Clinic: 1 week    Quit Date: 10/26/17

## 2018-01-31 ENCOUNTER — CLINICAL SUPPORT (OUTPATIENT)
Dept: SMOKING CESSATION | Facility: CLINIC | Age: 73
End: 2018-01-31
Payer: COMMERCIAL

## 2018-01-31 DIAGNOSIS — F17.200 NICOTINE DEPENDENCE: Primary | ICD-10-CM

## 2018-01-31 PROCEDURE — 99404 PREV MED CNSL INDIV APPRX 60: CPT | Mod: S$GLB,,,

## 2018-01-31 NOTE — Clinical Note
Patient was counseled as indiviual this morning to discusse his discharge from program next next. Continued to congratulate him on is quit and begin tobacco free since 10/26/17. He is so proud of himself. Pt's exhaled carbon monoxide level was 3 ppm per smokerlyzer (0-6 ppm non-smoker).Patient will return one last time in 1 week.

## 2018-02-01 NOTE — PROGRESS NOTES
Individual Follow-Up Form    1/31/18    Quit Date: 10/26/17    Clinical Status of Patient: Outpatient    Length of Service: 60 minutes    Continuing Medication: no    Other Medications: none     Target Symptoms: Withdrawal and medication side effects. The following were  rated moderate (3) to severe (4) on TCRS:  · Moderate (3): none  · Severe (4): none    Comments: Patient was counseled as individual this morning to discussed his discharge from program next next. Continued to congratulate him on is quit and begin tobacco free since 10/26/17. He is so proud of himself. Pt's exhaled carbon monoxide level was 3 ppm per smokerlyzer (0-6 ppm non-smoker).Patient will return one last time in 1 week.    Diagnosis: F17.200    Next Visit: 1 week

## 2018-02-07 ENCOUNTER — CLINICAL SUPPORT (OUTPATIENT)
Dept: SMOKING CESSATION | Facility: CLINIC | Age: 73
End: 2018-02-07
Payer: COMMERCIAL

## 2018-02-07 DIAGNOSIS — F17.200 NICOTINE DEPENDENCE: Primary | ICD-10-CM

## 2018-02-07 PROCEDURE — 90853 GROUP PSYCHOTHERAPY: CPT | Mod: S$GLB,,,

## 2018-02-07 NOTE — Clinical Note
Patient still remains tobacco free. He is so proud of himself. Today was patient's lass group session. Will continue to follow up with phone calls. Pt's exhaled carbon monoxide level was 2 ppm per smokerlyzer (0-6 ppm non-smoker). Left him with contact information if he needed anything.

## 2018-02-08 NOTE — PROGRESS NOTES
Smoking Cessation Group Session #8    Site: [facility]  Date:  2/7/18  Clinical Status of Patient: Outpatient   Length of Service and Code: 90 minutes - 17722   Number in Attendance: 2  Group Activities/Focus of Group:  Sharing last weeks challenges, triggers, and coping activities to remain quit and/ or keep making progress toward cessation, completion of TCRS (Tobacco Cessation Rating Scale) learned addiction model, personal reasons for quitting, medications, goals, quit date, reviewed strategies, cues, triggers, high risk situations, lapses, relapses, diet, exercise, stress, relaxation, sleep, habitual behavior, and life style changes.      Target symptoms:  withdrawal and medication side effects             The following were rated moderate (3) to severe (4) on TCRS:       Moderate 3: none     Severe 4:   none  Patient's Response to Intervention: Patient still remains tobacco free. He is so proud of himself. Today was patient's lass group session. Will continue to follow up with phone calls. Pt's exhaled carbon monoxide level was 2 ppm per smokerlyzer (0-6 ppm non-smoker). Left him with contact information if he needed anything.  Progress Toward Goals and Other Mental Status Changes: The patient denies any abnormal behavioral or mental changes at this time.       Diagnosis: Z72.0  Plan: The patient will continue with group therapy sessions and medication regimen prescribed with management by physician or by the Cessation Clinic Provider. Patient will inform Smoking Cessation Counselor of symptoms as rated high on TCRS.    Return to Clinic: finished program    Quit Date: 10/ 26/17

## 2018-03-18 ENCOUNTER — HOSPITAL ENCOUNTER (INPATIENT)
Facility: HOSPITAL | Age: 73
LOS: 4 days | Discharge: NURSING, REHAB OR LONG-TERM CARE | DRG: 564 | End: 2018-03-23
Attending: FAMILY MEDICINE | Admitting: HOSPITALIST
Payer: MEDICARE

## 2018-03-18 DIAGNOSIS — R55 SYNCOPE: ICD-10-CM

## 2018-03-18 DIAGNOSIS — I25.10 CORONARY ARTERY DISEASE INVOLVING NATIVE CORONARY ARTERY OF NATIVE HEART WITHOUT ANGINA PECTORIS: Chronic | ICD-10-CM
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
## 2018-03-18 DIAGNOSIS — M62.81 GENERALIZED MUSCLE WEAKNESS: ICD-10-CM

## 2018-03-18 DIAGNOSIS — R53.81 DEBILITY: ICD-10-CM

## 2018-03-18 DIAGNOSIS — R29.6 FREQUENT FALLS: ICD-10-CM

## 2018-03-18 PROBLEM — N40.0 BPH (BENIGN PROSTATIC HYPERPLASIA): Status: ACTIVE | Noted: 2018-03-18

## 2018-03-18 PROBLEM — T79.6XXA TRAUMATIC RHABDOMYOLYSIS: Status: ACTIVE | Noted: 2018-03-18

## 2018-03-18 LAB
ALBUMIN SERPL BCP-MCNC: 4.7 G/DL
ALP SERPL-CCNC: 122 U/L
ALT SERPL W/O P-5'-P-CCNC: 25 U/L
ANION GAP SERPL CALC-SCNC: 17 MMOL/L
AST SERPL-CCNC: 75 U/L
BACTERIA #/AREA URNS AUTO: ABNORMAL /HPF
BASOPHILS # BLD AUTO: 0.02 K/UL
BASOPHILS NFR BLD: 0.2 %
BILIRUB SERPL-MCNC: 1.2 MG/DL
BILIRUB UR QL STRIP: NEGATIVE
BUN SERPL-MCNC: 19 MG/DL
CALCIUM SERPL-MCNC: 10.1 MG/DL
CHLORIDE SERPL-SCNC: 100 MMOL/L
CK SERPL-CCNC: 1409 U/L
CLARITY UR REFRACT.AUTO: ABNORMAL
CO2 SERPL-SCNC: 26 MMOL/L
COLOR UR AUTO: YELLOW
CREAT SERPL-MCNC: 0.74 MG/DL
DIFFERENTIAL METHOD: ABNORMAL
EOSINOPHIL # BLD AUTO: 0 K/UL
EOSINOPHIL NFR BLD: 0.2 %
ERYTHROCYTE [DISTWIDTH] IN BLOOD BY AUTOMATED COUNT: 14.6 %
EST. GFR  (AFRICAN AMERICAN): >60 ML/MIN/1.73 M^2
EST. GFR  (NON AFRICAN AMERICAN): >60 ML/MIN/1.73 M^2
GLUCOSE SERPL-MCNC: 97 MG/DL
GLUCOSE UR QL STRIP: NEGATIVE
HCT VFR BLD AUTO: 52.1 %
HGB BLD-MCNC: 17.8 G/DL
HGB UR QL STRIP: ABNORMAL
HYALINE CASTS UR QL AUTO: 2 /LPF
KETONES UR QL STRIP: ABNORMAL
LEUKOCYTE ESTERASE UR QL STRIP: NEGATIVE
LYMPHOCYTES # BLD AUTO: 1.2 K/UL
LYMPHOCYTES NFR BLD: 12.7 %
MCH RBC QN AUTO: 27.8 PG
MCHC RBC AUTO-ENTMCNC: 34.2 G/DL
MCV RBC AUTO: 81 FL
MICROSCOPIC COMMENT: ABNORMAL
MONOCYTES # BLD AUTO: 0.7 K/UL
MONOCYTES NFR BLD: 7.8 %
NEUTROPHILS # BLD AUTO: 7.2 K/UL
NEUTROPHILS NFR BLD: 78.6 %
NITRITE UR QL STRIP: NEGATIVE
PH UR STRIP: 6 [PH] (ref 5–8)
PLATELET # BLD AUTO: 208 K/UL
PMV BLD AUTO: 11.4 FL
POTASSIUM SERPL-SCNC: 4.4 MMOL/L
PROT SERPL-MCNC: 9.8 G/DL
PROT UR QL STRIP: ABNORMAL
RBC # BLD AUTO: 6.41 M/UL
RBC #/AREA URNS AUTO: 10 /HPF (ref 0–4)
SODIUM SERPL-SCNC: 143 MMOL/L
SP GR UR STRIP: 1.02 (ref 1–1.03)
URN SPEC COLLECT METH UR: ABNORMAL
UROBILINOGEN UR STRIP-ACNC: 1 EU/DL
WBC # BLD AUTO: 9.12 K/UL
WBC #/AREA URNS AUTO: 1 /HPF (ref 0–5)

## 2018-03-18 PROCEDURE — 25000003 PHARM REV CODE 250: Performed by: HOSPITALIST

## 2018-03-18 PROCEDURE — 93010 ELECTROCARDIOGRAM REPORT: CPT | Mod: ,,, | Performed by: INTERNAL MEDICINE

## 2018-03-18 PROCEDURE — 94640 AIRWAY INHALATION TREATMENT: CPT

## 2018-03-18 PROCEDURE — 82550 ASSAY OF CK (CPK): CPT

## 2018-03-18 PROCEDURE — 93005 ELECTROCARDIOGRAM TRACING: CPT

## 2018-03-18 PROCEDURE — G0378 HOSPITAL OBSERVATION PER HR: HCPCS

## 2018-03-18 PROCEDURE — 94760 N-INVAS EAR/PLS OXIMETRY 1: CPT

## 2018-03-18 PROCEDURE — 85025 COMPLETE CBC W/AUTO DIFF WBC: CPT

## 2018-03-18 PROCEDURE — 81000 URINALYSIS NONAUTO W/SCOPE: CPT

## 2018-03-18 PROCEDURE — 99285 EMERGENCY DEPT VISIT HI MDM: CPT

## 2018-03-18 PROCEDURE — 25000003 PHARM REV CODE 250: Performed by: NURSE PRACTITIONER

## 2018-03-18 PROCEDURE — 80053 COMPREHEN METABOLIC PANEL: CPT

## 2018-03-18 PROCEDURE — 25000003 PHARM REV CODE 250: Performed by: FAMILY MEDICINE

## 2018-03-18 RX ORDER — ACETAMINOPHEN 325 MG/1
650 TABLET ORAL EVERY 4 HOURS PRN
Status: DISCONTINUED | OUTPATIENT
Start: 2018-03-18 | End: 2018-03-23 | Stop reason: HOSPADM

## 2018-03-18 RX ORDER — IBUPROFEN 200 MG
1 TABLET ORAL DAILY
Status: DISCONTINUED | OUTPATIENT
Start: 2018-03-19 | End: 2018-03-23 | Stop reason: HOSPADM

## 2018-03-18 RX ORDER — FINASTERIDE 5 MG/1
5 TABLET, FILM COATED ORAL DAILY
Status: ON HOLD | COMMUNITY
End: 2018-03-20

## 2018-03-18 RX ORDER — ATORVASTATIN CALCIUM 20 MG/1
20 TABLET, FILM COATED ORAL DAILY
Status: DISCONTINUED | OUTPATIENT
Start: 2018-03-19 | End: 2018-03-23 | Stop reason: HOSPADM

## 2018-03-18 RX ORDER — CILOSTAZOL 50 MG/1
50 TABLET ORAL 2 TIMES DAILY
Status: DISCONTINUED | OUTPATIENT
Start: 2018-03-18 | End: 2018-03-23 | Stop reason: HOSPADM

## 2018-03-18 RX ORDER — SODIUM CHLORIDE 0.9 % (FLUSH) 0.9 %
5 SYRINGE (ML) INJECTION
Status: DISCONTINUED | OUTPATIENT
Start: 2018-03-18 | End: 2018-03-20

## 2018-03-18 RX ORDER — ASPIRIN 81 MG/1
81 TABLET ORAL DAILY
Status: DISCONTINUED | OUTPATIENT
Start: 2018-03-19 | End: 2018-03-23 | Stop reason: HOSPADM

## 2018-03-18 RX ORDER — ENOXAPARIN SODIUM 100 MG/ML
40 INJECTION SUBCUTANEOUS EVERY 24 HOURS
Status: DISCONTINUED | OUTPATIENT
Start: 2018-03-19 | End: 2018-03-23 | Stop reason: HOSPADM

## 2018-03-18 RX ORDER — TAMSULOSIN HYDROCHLORIDE 0.4 MG/1
0.4 CAPSULE ORAL DAILY
Status: DISCONTINUED | OUTPATIENT
Start: 2018-03-19 | End: 2018-03-23 | Stop reason: HOSPADM

## 2018-03-18 RX ORDER — IPRATROPIUM BROMIDE AND ALBUTEROL SULFATE 2.5; .5 MG/3ML; MG/3ML
3 SOLUTION RESPIRATORY (INHALATION) EVERY 4 HOURS
Status: DISCONTINUED | OUTPATIENT
Start: 2018-03-19 | End: 2018-03-19

## 2018-03-18 RX ORDER — SODIUM CHLORIDE 9 MG/ML
INJECTION, SOLUTION INTRAVENOUS CONTINUOUS
Status: DISCONTINUED | OUTPATIENT
Start: 2018-03-18 | End: 2018-03-19

## 2018-03-18 RX ORDER — ONDANSETRON 8 MG/1
8 TABLET, ORALLY DISINTEGRATING ORAL EVERY 8 HOURS PRN
Status: DISCONTINUED | OUTPATIENT
Start: 2018-03-18 | End: 2018-03-23 | Stop reason: HOSPADM

## 2018-03-18 RX ORDER — BACLOFEN 10 MG/1
10 TABLET ORAL 3 TIMES DAILY PRN
Status: DISCONTINUED | OUTPATIENT
Start: 2018-03-18 | End: 2018-03-23 | Stop reason: HOSPADM

## 2018-03-18 RX ORDER — HYDROCODONE BITARTRATE AND ACETAMINOPHEN 5; 325 MG/1; MG/1
1 TABLET ORAL EVERY 6 HOURS PRN
Status: DISCONTINUED | OUTPATIENT
Start: 2018-03-18 | End: 2018-03-23 | Stop reason: HOSPADM

## 2018-03-18 RX ORDER — LISINOPRIL 5 MG/1
5 TABLET ORAL DAILY
Status: DISCONTINUED | OUTPATIENT
Start: 2018-03-19 | End: 2018-03-23 | Stop reason: HOSPADM

## 2018-03-18 RX ORDER — CILOSTAZOL 100 MG/1
100 TABLET ORAL 2 TIMES DAILY
COMMUNITY

## 2018-03-18 RX ADMIN — IPRATROPIUM BROMIDE AND ALBUTEROL SULFATE 3 ML: .5; 3 SOLUTION RESPIRATORY (INHALATION) at 11:03

## 2018-03-18 RX ADMIN — SODIUM CHLORIDE: 0.9 INJECTION, SOLUTION INTRAVENOUS at 09:03

## 2018-03-18 RX ADMIN — CILOSTAZOL 50 MG: 50 TABLET ORAL at 10:03

## 2018-03-18 RX ADMIN — SODIUM CHLORIDE 500 ML: 0.9 INJECTION, SOLUTION INTRAVENOUS at 02:03

## 2018-03-18 NOTE — ED NOTES
"Pt arrival per EMS from [facility], pt found on the floor, unknown amount of time, pt last seen not on floor was yesterday. Pt oriented to his name, pt's son said this is his normal orientation. Pt's son states pt walks at his assisted living home with a walker. Pt's son states "I am not sure when or how much of his medicine he has taken"   "

## 2018-03-18 NOTE — ED NOTES
Pt accepted for admission per Dr Carrero to [facility], informed pt and pt's family that the wait is on the room to be planned , should be within the hour.

## 2018-03-18 NOTE — ED TRIAGE NOTES
fell out of bed today unknown how long he was on the floor. complaint of back pain. from [facility]

## 2018-03-18 NOTE — ED NOTES
Per MD request, pt escorted out of the bed per RN and Tech, to bedside with assistance to visualize pt's attempt to walk. Pt unable to stand with assistance on each side. Reported to MD.
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
## 2018-03-18 NOTE — ED NOTES
Pt with wet clothes with urine smell, pt cleaned with warm wipes and soap, rinsed and dried with clean dry gown placed on pt with warm blanket applied.

## 2018-03-19 LAB
ALBUMIN SERPL BCP-MCNC: 3.1 G/DL
ALP SERPL-CCNC: 79 U/L
ALT SERPL W/O P-5'-P-CCNC: 21 U/L
ANION GAP SERPL CALC-SCNC: 9 MMOL/L
AST SERPL-CCNC: 32 U/L
BILIRUB SERPL-MCNC: 0.8 MG/DL
BUN SERPL-MCNC: 33 MG/DL
CALCIUM SERPL-MCNC: 9.1 MG/DL
CHLORIDE SERPL-SCNC: 107 MMOL/L
CK SERPL-CCNC: 1109 U/L
CO2 SERPL-SCNC: 24 MMOL/L
CREAT SERPL-MCNC: 1 MG/DL
EST. GFR  (AFRICAN AMERICAN): >60 ML/MIN/1.73 M^2
EST. GFR  (NON AFRICAN AMERICAN): >60 ML/MIN/1.73 M^2
GLUCOSE SERPL-MCNC: 94 MG/DL
MAGNESIUM SERPL-MCNC: 2 MG/DL
PHOSPHATE SERPL-MCNC: 3.1 MG/DL
POTASSIUM SERPL-SCNC: 3.7 MMOL/L
PROT SERPL-MCNC: 7.3 G/DL
SODIUM SERPL-SCNC: 140 MMOL/L

## 2018-03-19 PROCEDURE — 94640 AIRWAY INHALATION TREATMENT: CPT

## 2018-03-19 PROCEDURE — 25000242 PHARM REV CODE 250 ALT 637 W/ HCPCS: Performed by: NURSE PRACTITIONER

## 2018-03-19 PROCEDURE — 27000221 HC OXYGEN, UP TO 24 HOURS

## 2018-03-19 PROCEDURE — 25000003 PHARM REV CODE 250: Performed by: HOSPITALIST

## 2018-03-19 PROCEDURE — 36415 COLL VENOUS BLD VENIPUNCTURE: CPT

## 2018-03-19 PROCEDURE — 94761 N-INVAS EAR/PLS OXIMETRY MLT: CPT

## 2018-03-19 PROCEDURE — 11000001 HC ACUTE MED/SURG PRIVATE ROOM

## 2018-03-19 PROCEDURE — G8978 MOBILITY CURRENT STATUS: HCPCS | Mod: CL

## 2018-03-19 PROCEDURE — 97530 THERAPEUTIC ACTIVITIES: CPT

## 2018-03-19 PROCEDURE — 97161 PT EVAL LOW COMPLEX 20 MIN: CPT

## 2018-03-19 PROCEDURE — 97165 OT EVAL LOW COMPLEX 30 MIN: CPT

## 2018-03-19 PROCEDURE — 63600175 PHARM REV CODE 636 W HCPCS: Performed by: HOSPITALIST

## 2018-03-19 PROCEDURE — 84100 ASSAY OF PHOSPHORUS: CPT

## 2018-03-19 PROCEDURE — 86580 TB INTRADERMAL TEST: CPT | Performed by: HOSPITALIST

## 2018-03-19 PROCEDURE — 25000003 PHARM REV CODE 250: Performed by: NURSE PRACTITIONER

## 2018-03-19 PROCEDURE — 25000242 PHARM REV CODE 250 ALT 637 W/ HCPCS: Performed by: HOSPITALIST

## 2018-03-19 PROCEDURE — 83735 ASSAY OF MAGNESIUM: CPT

## 2018-03-19 PROCEDURE — 82550 ASSAY OF CK (CPK): CPT

## 2018-03-19 PROCEDURE — G8979 MOBILITY GOAL STATUS: HCPCS | Mod: CK

## 2018-03-19 PROCEDURE — 80053 COMPREHEN METABOLIC PANEL: CPT

## 2018-03-19 RX ORDER — IPRATROPIUM BROMIDE AND ALBUTEROL SULFATE 2.5; .5 MG/3ML; MG/3ML
3 SOLUTION RESPIRATORY (INHALATION)
Status: DISCONTINUED | OUTPATIENT
Start: 2018-03-19 | End: 2018-03-20

## 2018-03-19 RX ADMIN — IPRATROPIUM BROMIDE AND ALBUTEROL SULFATE 3 ML: .5; 3 SOLUTION RESPIRATORY (INHALATION) at 08:03

## 2018-03-19 RX ADMIN — IPRATROPIUM BROMIDE AND ALBUTEROL SULFATE 3 ML: .5; 3 SOLUTION RESPIRATORY (INHALATION) at 07:03

## 2018-03-19 RX ADMIN — IPRATROPIUM BROMIDE AND ALBUTEROL SULFATE 3 ML: .5; 3 SOLUTION RESPIRATORY (INHALATION) at 04:03

## 2018-03-19 RX ADMIN — CILOSTAZOL 50 MG: 50 TABLET ORAL at 09:03

## 2018-03-19 RX ADMIN — CILOSTAZOL 50 MG: 50 TABLET ORAL at 08:03

## 2018-03-19 RX ADMIN — ATORVASTATIN CALCIUM 20 MG: 20 TABLET, FILM COATED ORAL at 08:03

## 2018-03-19 RX ADMIN — LISINOPRIL 5 MG: 5 TABLET ORAL at 08:03

## 2018-03-19 RX ADMIN — ASPIRIN 81 MG: 81 TABLET, COATED ORAL at 08:03

## 2018-03-19 RX ADMIN — SODIUM CHLORIDE: 0.9 INJECTION, SOLUTION INTRAVENOUS at 02:03

## 2018-03-19 RX ADMIN — TAMSULOSIN HYDROCHLORIDE 0.4 MG: 0.4 CAPSULE ORAL at 08:03

## 2018-03-19 RX ADMIN — TUBERCULIN PURIFIED PROTEIN DERIVATIVE 5 UNITS: 5 INJECTION INTRADERMAL at 05:03

## 2018-03-19 RX ADMIN — ENOXAPARIN SODIUM 40 MG: 100 INJECTION SUBCUTANEOUS at 08:03

## 2018-03-19 RX ADMIN — IPRATROPIUM BROMIDE AND ALBUTEROL SULFATE 3 ML: .5; 3 SOLUTION RESPIRATORY (INHALATION) at 11:03

## 2018-03-19 NOTE — PLAN OF CARE
Problem: Patient Care Overview  Goal: Plan of Care Review  Outcome: Ongoing (interventions implemented as appropriate)  Plan of care reviewed with patient. Call light within reach, fall precautions maintained, bed alarm set. Pt turned Q2 hrs to prevent furhter skin breakdown. S2 to coccyx covered in mepilex. Patient aware. Nurse instructed patient to call if needs assistance. Patient verbalized complete understanding. Telemetry monitor SR throughout shift. NAD noted. Will continue to monitor and continue plan of care.
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
## 2018-03-19 NOTE — PLAN OF CARE
Problem: Patient Care Overview  Goal: Plan of Care Review  Outcome: Outcome(s) achieved Date Met: 03/19/18  Cont to monitor resp needs

## 2018-03-19 NOTE — PLAN OF CARE
Problem: Physical Therapy Goal  Goal: Physical Therapy Goal  Goals to be met by: 18     Patient will increase functional independence with mobility by performin. Supine <> sit with MInimal Assistance  2. Sit to stand transfer with Minimal Assistance  3. Bed to chair transfer with Minimal Assistance using Rolling Walker  4. Gait  x 25 feet with Minimal Assistance using Rolling Walker.   5. Sitting at edge of bed x 15 minutes with Stand-by Assistance    Outcome: Ongoing (interventions implemented as appropriate)  PT evaluation completed with note to follow. Pt required max A to stand and was only able to assume squatted position. Pt's BP decreased to 77/53 mmHg in sitting. Recommending SNF placement upon d/c.

## 2018-03-19 NOTE — ASSESSMENT & PLAN NOTE
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  His niece loads his medication in pill bottle. He does not know his medication and is having some dementia (Has no idea what year it is).  He can barely lift any extremity.  Will likely need nursing home placement,  --PT/OT Consult

## 2018-03-19 NOTE — PROGRESS NOTES
The Sw called [facility]([phone])spoke to Charlene in admissions but she states she doesn't have a bed now but states she may have one in about 2 days. The Sw faxed the info to her via [employer]. The Sw called [facility]([phone]) spoke to Pablo who states they are in net work with [facility] and she does have snf beds. The Sw faxed her the pt's info via [employer].

## 2018-03-19 NOTE — PLAN OF CARE
TN spoke with patient and was given permission to contact his son Joe to get SNF choices as patient is amicable to SNF.  Joe Craig Son     [phone]     1. [facility]  2. [facility] [place]    These are facilities that are close to where Joe lives in [place].  Patient is currently IP status, pending 3 MN .       03/19/18 1117   Discharge Assessment   Assessment Type Discharge Planning Assessment   Confirmed/corrected address and phone number on facesheet? Yes   Assessment information obtained from? Patient   Communicated expected length of stay with patient/caregiver yes   Prior to hospitilization cognitive status: Alert/Oriented   Prior to hospitalization functional status: Assistive Equipment   Current cognitive status: Alert/Oriented   Current Functional Status: Assistive Equipment   Facility Arrived From: [facility]   Lives With alone   Able to Return to Prior Arrangements unable to determine at this time (comments)   Is patient able to care for self after discharge? Unable to determine at this time (comments)   Who are your caregiver(s) and their phone number(s)? Joe Craig Son     [phone]    Patient's perception of discharge disposition skilled nursing facility   Readmission Within The Last 30 Days no previous admission in last 30 days   Patient currently being followed by outpatient case management? No   Patient currently receives any other outside agency services? No   Equipment Currently Used at Home rollator   Do you have any problems affording any of your prescribed medications? No   Is the patient taking medications as prescribed? (undetermined)   Does the patient have transportation home? Yes   Transportation Available family or friend will provide   Dialysis Name and Scheduled days n/a   Does the patient receive services at the [facility]? No   Discharge Plan A Skilled Nursing Facility   Discharge Plan B Assisted Living;Home Health   Patient/Family In Agreement  With Plan yes
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
## 2018-03-19 NOTE — ASSESSMENT & PLAN NOTE
Essential Hypertension  Telemetry Monitoring, Continue home Aspirin 81 mg daily, Atorvastatin 20 mg daily, Cilostazol 50 mg BID,

## 2018-03-19 NOTE — ASSESSMENT & PLAN NOTE
"Fall  Patient fell sometime in the past 2 days d/t legs getting "stiff" and slept on the floor all weakened.  Denies suncope. CPK 1409.  Continue IV Fluids. Monitor Labs.  Neuro Checks    "

## 2018-03-19 NOTE — HPI
"Goyo Craig Jr. is a 72 y.o. black man with hypertension, history of myocardial infarction status post coronary stent placement to LAD on 7/20/07, left shoulder weakness since neck surgery, right lower extremity weakness requiring use of a walker, old lacunar strokes, bidirectional atrial shunt, diastolic dysfunction, bradycardia, history of vasovagal syncope, and benign prostatic hyperplasia.  He lives in [facility] living [facility] in [place], [place].  His primary care physician is Dr. Chrissy Hayes.     He was taken to [facility] Emergency Department on Pk 3/18/18 after he fell on Friday 3/16/18 and no one had checked on him until the afternoon of presentation.  He had been lying on the ground, unable to get up.  He had no injuries found but had rhabdomyolysis with CPK of 1409.  He was given 500 mL of normal saline in the ED and admitted to [facility].    Assessment on admission:  "Open arrival to the unit, patient is oriented to person and place, but not time.  He states that he has been falling because his legs (and arms) stiffen and get week. He states that he just slept on the floor this weekend. He says 3 ladies were there when he fell once.  It sounds like this is a recurring thing. He can barely lift his extremities off the bed (all 4).   His is visibly dyspneic. He denies chest pain, fevers, abdominal pain, nausea or vomiting.  He will likely need more then assisted living upon discharge."  "

## 2018-03-20 PROBLEM — R29.6 FREQUENT FALLS: Status: ACTIVE | Noted: 2018-03-18

## 2018-03-20 PROBLEM — N40.0 BPH (BENIGN PROSTATIC HYPERPLASIA): Chronic | Status: ACTIVE | Noted: 2018-03-18

## 2018-03-20 PROBLEM — T79.6XXA TRAUMATIC RHABDOMYOLYSIS: Status: RESOLVED | Noted: 2018-03-18 | Resolved: 2018-03-20

## 2018-03-20 LAB
ALBUMIN SERPL BCP-MCNC: 2.8 G/DL
ANION GAP SERPL CALC-SCNC: 8 MMOL/L
BUN SERPL-MCNC: 42 MG/DL
CALCIUM SERPL-MCNC: 9 MG/DL
CHLORIDE SERPL-SCNC: 108 MMOL/L
CK SERPL-CCNC: 521 U/L
CO2 SERPL-SCNC: 23 MMOL/L
CREAT SERPL-MCNC: 1.2 MG/DL
EST. GFR  (AFRICAN AMERICAN): >60 ML/MIN/1.73 M^2
EST. GFR  (NON AFRICAN AMERICAN): 60 ML/MIN/1.73 M^2
GLUCOSE SERPL-MCNC: 107 MG/DL
MAGNESIUM SERPL-MCNC: 2 MG/DL
PHOSPHATE SERPL-MCNC: 3.9 MG/DL
POTASSIUM SERPL-SCNC: 3.4 MMOL/L
SODIUM SERPL-SCNC: 139 MMOL/L

## 2018-03-20 PROCEDURE — 83735 ASSAY OF MAGNESIUM: CPT

## 2018-03-20 PROCEDURE — 11000001 HC ACUTE MED/SURG PRIVATE ROOM

## 2018-03-20 PROCEDURE — 27000221 HC OXYGEN, UP TO 24 HOURS

## 2018-03-20 PROCEDURE — 25000003 PHARM REV CODE 250: Performed by: NURSE PRACTITIONER

## 2018-03-20 PROCEDURE — 97530 THERAPEUTIC ACTIVITIES: CPT

## 2018-03-20 PROCEDURE — 36415 COLL VENOUS BLD VENIPUNCTURE: CPT

## 2018-03-20 PROCEDURE — 94761 N-INVAS EAR/PLS OXIMETRY MLT: CPT

## 2018-03-20 PROCEDURE — 63600175 PHARM REV CODE 636 W HCPCS: Performed by: HOSPITALIST

## 2018-03-20 PROCEDURE — 94640 AIRWAY INHALATION TREATMENT: CPT

## 2018-03-20 PROCEDURE — 25000242 PHARM REV CODE 250 ALT 637 W/ HCPCS: Performed by: HOSPITALIST

## 2018-03-20 PROCEDURE — 80069 RENAL FUNCTION PANEL: CPT

## 2018-03-20 PROCEDURE — 82550 ASSAY OF CK (CPK): CPT

## 2018-03-20 RX ORDER — IPRATROPIUM BROMIDE AND ALBUTEROL SULFATE 2.5; .5 MG/3ML; MG/3ML
3 SOLUTION RESPIRATORY (INHALATION) EVERY 4 HOURS PRN
Status: DISCONTINUED | OUTPATIENT
Start: 2018-03-20 | End: 2018-03-23 | Stop reason: HOSPADM

## 2018-03-20 RX ORDER — MELOXICAM 7.5 MG/1
7.5 TABLET ORAL DAILY
COMMUNITY

## 2018-03-20 RX ORDER — LISINOPRIL 10 MG/1
10 TABLET ORAL DAILY
COMMUNITY

## 2018-03-20 RX ADMIN — CILOSTAZOL 50 MG: 50 TABLET ORAL at 08:03

## 2018-03-20 RX ADMIN — CILOSTAZOL 50 MG: 50 TABLET ORAL at 09:03

## 2018-03-20 RX ADMIN — LISINOPRIL 5 MG: 5 TABLET ORAL at 09:03

## 2018-03-20 RX ADMIN — ASPIRIN 81 MG: 81 TABLET, COATED ORAL at 09:03

## 2018-03-20 RX ADMIN — TAMSULOSIN HYDROCHLORIDE 0.4 MG: 0.4 CAPSULE ORAL at 09:03

## 2018-03-20 RX ADMIN — ATORVASTATIN CALCIUM 20 MG: 20 TABLET, FILM COATED ORAL at 09:03

## 2018-03-20 RX ADMIN — ENOXAPARIN SODIUM 40 MG: 100 INJECTION SUBCUTANEOUS at 09:03

## 2018-03-20 RX ADMIN — IPRATROPIUM BROMIDE AND ALBUTEROL SULFATE 3 ML: .5; 3 SOLUTION RESPIRATORY (INHALATION) at 08:03

## 2018-03-20 NOTE — ASSESSMENT & PLAN NOTE
Fall  CK down to 1109 today. Will stop IV fluids to avoid volume overload. Continue to monitor. Renal function is okay.

## 2018-03-20 NOTE — PLAN OF CARE
Plan of care reviewed with patient. Fall precautions maintained. Bed in lowest position, locked, call light within reach and bed alarm is on. Side rails up x's 2 with slip resistant socks on.  Patient repositioned to prevent further skin breakdown. Neuro checks performed,nurse assessed the patient throughout the shift for needsPatient on telemetry throughout shift with no ectopy noted. Will continue to monitor.

## 2018-03-20 NOTE — PLAN OF CARE
Problem: Occupational Therapy Goal  Goal: Occupational Therapy Goal  Goals to be met by: 3/29/2018     Patient will increase functional independence with ADLs by performing:    Grooming while seated with Contact Guard Assistance.  Sitting at edge of bed x20 minutes with Stand-by Assistance.  Rolling to Bilateral with Supervision.   Supine to sit with Contact Guard Assistance.  Toilet transfer to bedside commode  Assessment without distress.   Increased functional strength to 3/5 for B shlds.  Upper extremity exercise program 10 reps per handout, with assistance as needed.     Outcome: Ongoing (interventions implemented as appropriate)  OT initial eval completed. BP 77/53 seated EOB; HR 70. Poor tolerance for OOB.  Nurse notified. Recommend SNF at discharge from acute.

## 2018-03-20 NOTE — PROGRESS NOTES
[facility] Hospital Medicine  Progress Note    Patient Name: Goyo Craig Jr.  MRN: [id number]  Patient Class: IP- Inpatient   Admission Date: 3/18/2018  Length of Stay: 1 days  Attending Physician: Georges Montenegro MD  Primary Care Provider: Shawna Parry MD        Subjective:     Principal Problem:Traumatic rhabdomyolysis    HPI:  Goyo Craig Jr is a 72 year old male with a PMHx of coronary artery disease w/ coronary stents, hypertension, Lacunar Stroke, Syncope, and bradycardia.  He lives at [facility].  He uses a walker to ambulate. He is participating in a smoking cessation program.  He presented to [facility] Emergency Department  with complaint of a fall Friday morning (2 days ago).   No one checked on him and until this afternoon where he was found laying on the ground. No real injuries noted, but is unable to ambulate with 2 person assist.    He is alert and oriented ×3 .  His CBC is normal. CPK is elevated @ 1409. Tibili 1.2 (up from 0.7), AST 75 (up from 15) with normal ALT. He was treated in the ED with 500 ml Normal saline.  He is admitted to [facility], observation status for rhabdomyolysis.    Open arrival to the unit, patient is oriented to person and place, but not time.  He states that he has been falling because his legs (and arms) stiffen and get week. He states that he just slept on the floor this weekend. He says 3 ladies were there when he fell once.  It sounds like this is a recurring thing. He can barely lift his extremities off the bed (all 4).   His is visibly dyspneic. He denies chest pain, fevers, abdominal pain, nausea or vomiting.  He will likely need more then assisted living upon discharge.       Hospital Course:  No notes on file    Interval History: Feeling a little better, but still with significant weakness.     Review of Systems   Constitutional: Negative for chills and fever.   Respiratory: Negative for cough and  shortness of breath.    Cardiovascular: Negative for chest pain and palpitations.   Gastrointestinal: Negative for nausea and vomiting.     Objective:     Vital Signs (Most Recent):  Temp: 96.8 °F (36 °C) (03/19/18 1908)  Pulse: 85 (03/19/18 1908)  Resp: 18 (03/19/18 1908)  BP: (!) 107/59 (03/19/18 1908)  SpO2: 96 % (03/19/18 1630) Vital Signs (24h Range):  Temp:  [96 °F (35.6 °C)-97.8 °F (36.6 °C)] 96.8 °F (36 °C)  Pulse:  [] 85  Resp:  [12-18] 18  SpO2:  [92 %-97 %] 96 %  BP: (100-126)/(59-88) 107/59     Weight: 81.2 kg (179 lb)  Body mass index is 28.04 kg/m².    Intake/Output Summary (Last 24 hours) at 03/19/18 1913  Last data filed at 03/19/18 0830   Gross per 24 hour   Intake              125 ml   Output              440 ml   Net             -315 ml      Physical Exam   Constitutional: He is oriented to person, place, and time. He appears well-developed and well-nourished. No distress.   Cardiovascular: Normal rate and regular rhythm.    Pulmonary/Chest: Effort normal and breath sounds normal. No respiratory distress.   Abdominal: Soft. Bowel sounds are normal. He exhibits no distension. There is no tenderness.   Musculoskeletal: He exhibits edema and tenderness.   Neurological: He is alert and oriented to person, place, and time.   Skin: Skin is warm and dry.   Psychiatric: He has a normal mood and affect. His behavior is normal.   Nursing note and vitals reviewed.      Significant Labs:   CBC:   Recent Labs  Lab 03/18/18  1416   WBC 9.12   HGB 17.8   HCT 52.1        CMP:   Recent Labs  Lab 03/18/18  1416 03/19/18  0738    140   K 4.4 3.7    107   CO2 26 24   GLU 97 94   BUN 19 33*   CREATININE 0.74 1.0   CALCIUM 10.1 9.1   PROT 9.8* 7.3   ALBUMIN 4.7 3.1*   BILITOT 1.2* 0.8   ALKPHOS 122 79   AST 75* 32   ALT 25 21   ANIONGAP 17* 9   EGFRNONAA >60.0 >60     Magnesium:   Recent Labs  Lab 03/19/18  0738   MG 2.0       Significant Imaging: I have reviewed all pertinent imaging  results/findings within the past 24 hours.    Assessment/Plan:      * Traumatic rhabdomyolysis    Fall  CK down to 1109 today. Will stop IV fluids to avoid volume overload. Continue to monitor. Renal function is okay.        BPH (benign prostatic hyperplasia)    Continue Tamsulosin 0.4 mg Daily. Intake and Output. Bladder Scan PRN          Debility    Old Lacunar Stroke  Generalized Muscle Weakness  Will likely need SNF placement. PT/OT Consult. His 3rd midnight will be complete on 3/22.           Coronary artery disease involving native coronary artery of native heart without angina pectoris    Telemetry Monitoring, Continue home Aspirin 81 mg daily, Atorvastatin 20 mg daily, Cilostazol 50 mg BID,           Benign essential hypertension    SBP ranged 110 to 165. Continue lisinopril.             VTE Risk Mitigation         Ordered     enoxaparin injection 40 mg  Daily     Route:  Subcutaneous        03/18/18 2046     IP VTE HIGH RISK PATIENT  Once      03/18/18 2046              Georges Montenegro MD  Department of Hospital Medicine   [facility]

## 2018-03-20 NOTE — PLAN OF CARE
[facility] HOME HEALTH ORDERS  FACE TO FACE ENCOUNTER    Patient Name: Goyo Craig Jr.  YOB: 1945    PCP: Chrissy Hayes MD   PCP Address: [address] [place] [address] / [facility]  PCP Phone Number: [phone]  PCP Fax: [phone]    Encounter Date: 03/20/2018    Admit to Home Health    Diagnoses:  Active Hospital Problems    Diagnosis  POA    Frequent falls [R29.6]  Not Applicable    BPH (benign prostatic hyperplasia) [N40.0]  Yes     Chronic    Debility [R53.81]  Yes    Coronary artery disease involving native coronary artery of native heart without angina pectoris [I25.10]  Yes     Chronic    Generalized muscle weakness [M62.81]  Yes    Vasovagal syncope [R55]  Yes     Chronic    Right leg weakness [R29.898]  Yes     Chronic    Weakness of left upper extremity [R29.898]  Yes     Chronic     After left neck surgery      Benign essential hypertension [I10]  Yes     Chronic    Old lacunar stroke without late effect [Z86.73]  Not Applicable     Chronic    CAD S/P percutaneous coronary angioplasty [I25.10, Z98.61]  Not Applicable     Chronic      Resolved Hospital Problems    Diagnosis Date Resolved POA    *Traumatic rhabdomyolysis [T79.6XXA] 03/20/2018 Yes       No future appointments.  Follow-up Information     Chrissy Hayes MD.    Specialty:  Internal Medicine  Contact information:  [address] [place] [id number]  [phone]                     I have seen and examined this patient face to face today. My clinical findings that support the need for the home health skilled services and home bound status are the following:  Weakness/numbness causing balance and gait disturbance due to Weakness/Debility making it taxing to leave home.    Allergies:Review of patient's allergies indicates:  No Known Allergies    Diet: cardiac diet    Activities: activity as tolerated    Nursing:   SN to complete  comprehensive assessment including routine vital signs. Instruct on disease process and s/s of complications to report to MD. Review/verify medication list sent home with the patient at time of discharge  and instruct patient/caregiver as needed. Frequency may be adjusted depending on start of care date.    Notify MD if SBP > 160 or < 90; DBP > 90 or < 50; HR > 120 or < 50; Temp > 101      CONSULTS:    Physical Therapy to evaluate and treat. Evaluate for home safety and equipment needs; Establish/upgrade home exercise program. Perform / instruct on therapeutic exercises, gait training, transfer training, and Range of Motion.  Occupational Therapy to evaluate and treat. Evaluate home environment for safety and equipment needs. Perform/Instruct on transfers, ADL training, ROM, and therapeutic exercises.    MISCELLANEOUS CARE:  N/A    WOUND CARE ORDERS  n/a      Medications: Review discharge medications with patient and family and provide education.      Current Discharge Medication List      CONTINUE these medications which have NOT CHANGED    Details   aspirin (ECOTRIN) 81 MG EC tablet Take 81 mg by mouth once daily.      baclofen (LIORESAL) 10 MG tablet Take 10 mg by mouth 3 (three) times daily as needed (muscle spasm).      cilostazol (PLETAL) 100 MG Tab Take 100 mg by mouth 2 (two) times daily.       lisinopril 10 MG tablet Take 10 mg by mouth once daily.      tamsulosin (FLOMAX) 0.4 mg Cp24 Take 0.4 mg by mouth once daily.         STOP taking these medications       atorvastatin (LIPITOR) 20 MG tablet Comments:   Reason for Stopping:         nicotine polacrilex 2 MG Lozg Comments:   Reason for Stopping:               I certify that this patient is confined to his home and needs physical therapy and occupational therapy.      Manjeet Rangel MD  [facility]  03/20/2018
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
## 2018-03-20 NOTE — SUBJECTIVE & OBJECTIVE
Interval History: Feeling a little better, but still with significant weakness.     Review of Systems   Constitutional: Negative for chills and fever.   Respiratory: Negative for cough and shortness of breath.    Cardiovascular: Negative for chest pain and palpitations.   Gastrointestinal: Negative for nausea and vomiting.     Objective:     Vital Signs (Most Recent):  Temp: 96.8 °F (36 °C) (03/19/18 1908)  Pulse: 85 (03/19/18 1908)  Resp: 18 (03/19/18 1908)  BP: (!) 107/59 (03/19/18 1908)  SpO2: 96 % (03/19/18 1630) Vital Signs (24h Range):  Temp:  [96 °F (35.6 °C)-97.8 °F (36.6 °C)] 96.8 °F (36 °C)  Pulse:  [] 85  Resp:  [12-18] 18  SpO2:  [92 %-97 %] 96 %  BP: (100-126)/(59-88) 107/59     Weight: 81.2 kg (179 lb)  Body mass index is 28.04 kg/m².    Intake/Output Summary (Last 24 hours) at 03/19/18 1913  Last data filed at 03/19/18 0830   Gross per 24 hour   Intake              125 ml   Output              440 ml   Net             -315 ml      Physical Exam   Constitutional: He is oriented to person, place, and time. He appears well-developed and well-nourished. No distress.   Cardiovascular: Normal rate and regular rhythm.    Pulmonary/Chest: Effort normal and breath sounds normal. No respiratory distress.   Abdominal: Soft. Bowel sounds are normal. He exhibits no distension. There is no tenderness.   Musculoskeletal: He exhibits edema and tenderness.   Neurological: He is alert and oriented to person, place, and time.   Skin: Skin is warm and dry.   Psychiatric: He has a normal mood and affect. His behavior is normal.   Nursing note and vitals reviewed.      Significant Labs:   CBC:   Recent Labs  Lab 03/18/18  1416   WBC 9.12   HGB 17.8   HCT 52.1        CMP:   Recent Labs  Lab 03/18/18  1416 03/19/18  0738    140   K 4.4 3.7    107   CO2 26 24   GLU 97 94   BUN 19 33*   CREATININE 0.74 1.0   CALCIUM 10.1 9.1   PROT 9.8* 7.3   ALBUMIN 4.7 3.1*   BILITOT 1.2* 0.8   ALKPHOS 122 79   AST  75* 32   ALT 25 21   ANIONGAP 17* 9   EGFRNONAA >60.0 >60     Magnesium:   Recent Labs  Lab 03/19/18  0738   MG 2.0       Significant Imaging: I have reviewed all pertinent imaging results/findings within the past 24 hours.

## 2018-03-20 NOTE — PT/OT/SLP PROGRESS
"Physical Therapy Treatment    Patient Name:  Goyo Craig Jr.   MRN:  [id number]    Recommendations:     Discharge Recommendations:  nursing facility, skilled   Discharge Equipment Recommendations: wheelchair, hospital bed   Barriers to discharge: Inaccessible home and Decreased caregiver support    Assessment:     Goyo Craig Jr. is a 72 y.o. male admitted with a medical diagnosis of Traumatic rhabdomyolysis.  He presents with the following impairments/functional limitations:  weakness, impaired endurance, impaired self care skills, impaired functional mobilty, gait instability, impaired balance, impaired cognition, decreased lower extremity function, decreased upper extremity function, decreased safety awareness, decreased ROM, impaired coordination. Pt completed 2 stands with mod A to assume standing and max A required to improve upright posture. Pt tolerated 1 side step with RW and max A. Recommending SNF placement upon d/c as pt is unsafe to return to live in AL facility alone. Pt reports he was mod I with gait, toileting, and bathing PTA.    Rehab Prognosis: Good; patient would benefit from acute skilled PT services to address these deficits and reach maximum level of function.      Recent Surgery: * No surgery found *      Plan:     During this hospitalization, patient to be seen 6 x/week to address the above listed problems via gait training, therapeutic activities, therapeutic exercises, neuromuscular re-education  · Plan of Care Expires:  04/19/18   Plan of Care Reviewed with: patient, family    Subjective     Communicated with MIRA Valdes prior to session.  Patient found supine with HOB elevated upon PT entry to room, agreeable to treatment.      Chief Complaint: "i'm trying to figure out why it's so hard"  Pain/Comfort:  · Pain Rating 1: 0/10  · Pain Rating Post-Intervention 1: 0/10    Patients cultural, spiritual, Mormon conflicts given the current situation: none reported to PT    Objective: "     Patient found with: bed alarm, telemetry     General Precautions: Standard, fall   Orthopedic Precautions:N/A   Braces: N/A     Functional Mobility:  · Bed Mobility:     · Rolling Left:  moderate assistance   · Scooting: maximal assistance  · Supine to Sit: moderate assistance  · Sit to Supine: left sitting EOB with OT  · Transfers:     · Sit to Stand:  moderate assistance with rolling walker  · Gait: 1 side step left towards HOB with RW and max A- pt stands and steps with significantly flexed posture      AM-PAC 6 CLICK MOBILITY  Turning over in bed (including adjusting bedclothes, sheets and blankets)?: 2  Sitting down on and standing up from a chair with arms (e.g., wheelchair, bedside commode, etc.): 2  Moving from lying on back to sitting on the side of the bed?: 2  Moving to and from a bed to a chair (including a wheelchair)?: 2  Need to walk in hospital room?: 1  Climbing 3-5 steps with a railing?: 1  Total Score: 10       Therapeutic Activities and Exercises:  Vitals assessed throughout session, pt with no reports of dizziness/lightheadedness today.   Supine: 124/73 mmHg, 85 bpm  Sittin/68 mmHg, 94 bpm  Stand: 99/62 mmHg, 103 bpm  Pt sat EOB with SBA but with significantly flexed posture and downward gaze and does not correct with max cues. Pt instructed in 2 stands, pt requires mod A to assume standing then max A to correct posture but pt does not maintain corrected upright posture. Pt left sitting EOB with OT.    Patient left sitting EOB with OT present and RN notified..    GOALS:    Physical Therapy Goals        Problem: Physical Therapy Goal    Goal Priority Disciplines Outcome Goal Variances Interventions   Physical Therapy Goal     PT/OT, PT Ongoing (interventions implemented as appropriate)     Description:  Goals to be met by: 18     Patient will increase functional independence with mobility by performin. Supine <> sit with MInimal Assistance  2. Sit to stand transfer with  Minimal Assistance  3. Bed to chair transfer with Minimal Assistance using Rolling Walker  4. Gait  x 25 feet with Minimal Assistance using Rolling Walker.   5. Sitting at edge of bed x 15 minutes with Stand-by Assistance                      Time Tracking:     PT Received On: 03/20/18  PT Start Time: 1133     PT Stop Time: 1156  PT Total Time (min): 23 min some overlap with OT    Billable Minutes: Therapeutic Activity 10    Treatment Type: Treatment  PT/PTA: PT     PTA Visit Number: 0     Fariha Shah, PT  03/20/2018

## 2018-03-20 NOTE — PLAN OF CARE
Problem: Physical Therapy Goal  Goal: Physical Therapy Goal  Goals to be met by: 18     Patient will increase functional independence with mobility by performin. Supine <> sit with MInimal Assistance  2. Sit to stand transfer with Minimal Assistance  3. Bed to chair transfer with Minimal Assistance using Rolling Walker  4. Gait  x 25 feet with Minimal Assistance using Rolling Walker.   5. Sitting at edge of bed x 15 minutes with Stand-by Assistance     Outcome: Ongoing (interventions implemented as appropriate)  Pt tolerated 2 stands from EOB today, requiring mod A to assume standing and max A to improve standing posture as pt standing with significantly flexed posture. Pt took 1 side step left with RW and max A. Recommending SNF placement at this time.

## 2018-03-20 NOTE — PROGRESS NOTES
Update:  TN left message with patient's brother Tyler asking to speak about DC plan as per the patient's directive.    TN spoke with patient during his PT/OT sessions, noted he can stand with Max assist of 2 people and taking two sideways steps, no longer orthostatic.  Amicable to let TN speak with his brother for DC plan at this time.  TN encouraged niece to talk to patient more about going to SNF.  TN has asked that patient's DC be held as [facility] is independent living and while he has caregiver support, it is spotty at times and likely part of the reason the patient was on the floor at home for so long.      Nijuwan Cameron sets up his medications and she states they do plan on getting more caregiver support into his home.

## 2018-03-20 NOTE — HOSPITAL COURSE
He was given IV fluids until rhabdomyolysis resolved, then awaited 3 midnights in the hospital to qualify for skilled nursing facility placement.  On 3/20/18 he and his niece stated that he wanted to go home with home health instead, but wanted to stay another day in the hospital for physical therapy because he was still too weak.  At 1:05 am on 3/21/18, he began having fever.  Repeat chest x-ray showed a developing left lower lobe pneumonia.  He had no symptomatic complaints.  Procalcitonin was only 0.07, but he was put on an antibiotic anyway for the pneumonia.  Fever resolved.  He and his family preferred a trial of home health with therapy and durable medical equipment.  He already had a bedside commode and shower chair.  He was prescribed a wheelchair.  He will take amoxicillin-clavulanate to complete a 5-day course.  He changed his mind and was discharged to [facility] swing bed for skilled nursing and rehab, because his primary care physician is the director there.

## 2018-03-20 NOTE — DISCHARGE SUMMARY
"[facility] Hospital Medicine  Discharge Summary      Patient Name: Goyo Craig Jr.  MRN: [id number]  Admission Date: 3/18/2018  Hospital Length of Stay: 3 days  Discharge Date and Time:  03/22/2018 5:29 PM  Attending Physician: Manjeet Rangel MD   Discharging Provider: Manjeet Rangel MD  Primary Care Provider: Chrissy Hayes MD      HPI:   Goyo Craig Jr. is a 72 y.o. black man with hypertension, history of myocardial infarction status post coronary stent placement to LAD on 7/20/07, left shoulder weakness since neck surgery, right lower extremity weakness requiring use of a walker, old lacunar strokes, bidirectional atrial shunt, diastolic dysfunction, bradycardia, history of vasovagal syncope, and benign prostatic hyperplasia.  He lives in [facility] in [place], [place].  His primary care physician is Dr. Chrissy Hayes.     He was taken to [facility] on Pk 3/18/18 after he fell on Friday 3/16/18 and no one had checked on him until the afternoon of presentation.  He had been lying on the ground, unable to get up.  He had no injuries found but had rhabdomyolysis with CPK of 1409.  He was given 500 mL of normal saline in the ED and admitted to [facility].    Assessment on admission:  "Open arrival to the unit, patient is oriented to person and place, but not time.  He states that he has been falling because his legs (and arms) stiffen and get week. He states that he just slept on the floor this weekend. He says 3 ladies were there when he fell once.  It sounds like this is a recurring thing. He can barely lift his extremities off the bed (all 4).   His is visibly dyspneic. He denies chest pain, fevers, abdominal pain, nausea or vomiting.  He will likely need more then assisted living upon discharge."    Hospital Course:   He was given IV fluids until " rhabdomyolysis resolved, then awaited 3 midnights in the hospital to qualify for skilled nursing facility placement.  On 3/20/18 he and his niece stated that he wanted to go home with home health instead, but wanted to stay another day in the hospital for physical therapy because he was still too weak.  At 1:05 am on 3/21/18, he began having fever.  Repeat chest x-ray showed a developing left lower lobe pneumonia.  He had no symptomatic complaints.  Procalcitonin was only 0.07, but he was put on an antibiotic anyway for the pneumonia.  Fever resolved.  He and his family preferred a trial of home health with therapy and durable medical equipment.  He already had a bedside commode and shower chair.  He was prescribed a wheelchair.  He will take amoxicillin-clavulanate to complete a 5-day course.  He changed his mind and was discharged to [facility] swing bed for skilled nursing and rehab, because his primary care physician is the director there.     Consults:   Consults         Status Ordering Provider     Inpatient consult to Social Work  Once     Provider:  (Not yet assigned)    Acknowledged SHANTANU GORDON      PT, OT    Final Active Diagnoses:    Diagnosis Date Noted POA    Pneumonia of left lower lobe due to infectious organism [J18.1] 03/21/2018 Yes    Frequent falls [R29.6] 03/18/2018 Not Applicable    BPH (benign prostatic hyperplasia) [N40.0] 03/18/2018 Yes     Chronic    Debility [R53.81] 06/28/2017 Yes    Coronary artery disease involving native coronary artery of native heart without angina pectoris [I25.10] 02/07/2017 Yes     Chronic    Generalized muscle weakness [M62.81] 06/10/2016 Yes    Vasovagal syncope [R55] 03/22/2015 Yes     Chronic    Right leg weakness [R29.898] 03/05/2015 Yes     Chronic    Weakness of left upper extremity [R29.898] 03/05/2015 Yes     Chronic    Benign essential hypertension [I10] 03/04/2015 Yes     Chronic    Old lacunar stroke without late effect  "[Z86.73] 03/04/2015 Not Applicable     Chronic    CAD S/P percutaneous coronary angioplasty [I25.10, Z98.61] 03/04/2015 Not Applicable     Chronic      Problems Resolved During this Admission:    Diagnosis Date Noted Date Resolved POA    PRINCIPAL PROBLEM:  Traumatic rhabdomyolysis [T79.6XXA] 03/18/2018 03/20/2018 Yes    Fever [R50.9] 03/21/2018 03/22/2018 No       Discharged Condition: good    Disposition: Skilled Nursing Facility    Follow Up:  Follow-up Information     Chrsisy Hayes MD.    Specialty:  Internal Medicine  Contact information:  [address]  [address]  [facility] [place] [address]  [phone]                 Patient Instructions:     WHEELCHAIR FOR HOME USE   Order Specific Question Answer Comments   Hours in W/C per day: 12    Type of Wheelchair: Standard    Size(Width): 18"(STD adult)    Leg Support: STD footrests    Lap Belt: Velcro    Cushion: Basic    Height: average    Weight: 81.2 kg (179 lb)    Does patient have medical equipment at home? bedside commode    Does patient have medical equipment at home? rollator    Does patient have medical equipment at home? shower chair    Length of need (1-99 months): 99    Please check all that apply: Caregiver is capable and willing to operate wheelchair safely.      Diet Cardiac     Diet Cardiac     Activity as tolerated     Other restrictions (specify):   Order Comments: Use your cane and have someone around who can help you     Notify your health care provider if you experience any of the following:  increased confusion or weakness     Notify your health care provider if you experience any of the following:  persistent dizziness, light-headedness, or visual disturbances     Activity as tolerated     Notify your health care provider if you experience any of the following:  increased confusion or weakness         Significant Diagnostic Studies:   X-Ray Chest AP Portable 3/18/18: Low lung volumes.  Chronic interstitial coarsening. "  No acute infiltrates.  Aortic atherosclerosis. The cardiac silhouette is within normal limits.  Anterior and posterior fusion hardware at the cervical spine.  X-Ray Chest 1 View 3/21/18:  Heart size and pulmonary vascularity are similar.  There is diffuse increase in interstitial markings with some patchy increased consolidation at the left base obscuring the left hemidiaphragm.  No pneumothorax.  Advanced degenerative changes at the shoulders.    Medications:  Reconciled Home Medications:   Current Discharge Medication List      START taking these medications    Details   amoxicillin-clavulanate 875-125mg (AUGMENTIN) 875-125 mg per tablet Take 1 tablet by mouth every 12 (twelve) hours.  Qty: 7 tablet, Refills: 0         CONTINUE these medications which have NOT CHANGED    Details   aspirin (ECOTRIN) 81 MG EC tablet Take 81 mg by mouth once daily.      baclofen (LIORESAL) 10 MG tablet Take 10 mg by mouth 3 (three) times daily as needed (muscle spasm).      cilostazol (PLETAL) 100 MG Tab Take 100 mg by mouth 2 (two) times daily.       lisinopril 10 MG tablet Take 10 mg by mouth once daily.      meloxicam (MOBIC) 7.5 MG tablet Take 7.5 mg by mouth 2 (two) times daily. And as needed for arthritis pain      tamsulosin (FLOMAX) 0.4 mg Cp24 Take 0.4 mg by mouth once daily.         STOP taking these medications       atorvastatin (LIPITOR) 20 MG tablet Comments:   Reason for Stopping:         nicotine polacrilex 2 MG Lozg Comments:   Reason for Stopping:               Indwelling Lines/Drains at time of discharge:   Lines/Drains/Airways     Pressure Ulcer                 Pressure Injury 03/18/18 2100 Coccyx Stage 2 1 day                Time spent on the discharge of patient: 35 minutes  Patient was seen and examined on the date of discharge and determined to be suitable for discharge.         Manjeet Rangel MD  Department of Hospital Medicine  [facility]

## 2018-03-20 NOTE — PT/OT/SLP PROGRESS
Occupational Therapy   Treatment    Name: Goyo Craig Jr.  MRN: [id number]  Admitting Diagnosis:  Traumatic rhabdomyolysis       Recommendations:     Discharge Recommendations: nursing facility, skilled  Discharge Equipment Recommendations:  wheelchair, hospital bed  Barriers to discharge:  Decreased caregiver support    Subjective     Communicated with: nurse prior to session.  Pain/Comfort:  · Pain Rating 1: 0/10  · Pain Rating Post-Intervention 1: 0/10    Patients cultural, spiritual, Mu-ism conflicts given the current situation: na    Objective:     Patient found with: bed alarm, telemetry    General Precautions: Standard, fall   Orthopedic Precautions:N/A   Braces: N/A     Occupational Performance:    Bed Mobility:    · Patient completed Rolling/Turning to Left with  moderate assistance  · Patient completed Scooting/Bridging with moderate assistance  · Patient completed Supine to Sit with moderate assistance  · Patient completed Sit to Supine with moderate assistance     Functional Mobility/Transfers:  · Patient completed Sit <> Stand Transfer with moderate assistance  with  no assistive device   · Patient completed Bed <> Chair Transfer using Step Transfer technique with maximal assistance with no assistive device  · Functional Mobility: NA    Activities of Daily Living:  · Feeding:  supervision setup seated BS chair ; min repositioning 2/2 leaning to L side    Patient left HOB elevated with all lines intact, call button in reach, bed alarm on, nurse notified and sister present    [facility] 6 Click:  [facility] Total Score: 11    Treatment & Education:  Sitting balance EOB CGA-SBA 20 min with BUE supported on bed; forward trunk and neck posture; max vc for increase extension.  Sit<>stand from EOB and BS chair mod assist; forward trunk flexion desipte max vc to stand erect. Up in BS chair 1 hr for lunch meal with setup for feeding; min assist repositioning to midline 2/2 leaning to L side.     Education:    Assessment:     Goyo Craig Jr. is a 72 y.o. male with a medical diagnosis of Traumatic rhabdomyolysis.  He presents with improvement toward goals but not  Safe to live alone; high fall risk.  Performance deficits affecting function are weakness, impaired functional mobilty, decreased upper extremity function, decreased lower extremity function, decreased coordination, gait instability, impaired self care skills, impaired endurance, decreased safety awareness, decreased ROM, impaired joint extensibility.      Rehab Prognosis:  good patient would benefit from acute skilled OT services to address these deficits and reach maximum level of function.       Plan:     Patient to be seen 5 x/week to address the above listed problems via self-care/home management, therapeutic activities, therapeutic exercises  · Plan of Care Expires: 04/19/18  · Plan of Care Reviewed with: patient, sibling    This Plan of care has been discussed with the patient who was involved in its development and understands and is in agreement with the identified goals and treatment plan    GOALS:    Occupational Therapy Goals        Problem: Occupational Therapy Goal    Goal Priority Disciplines Outcome Interventions   Occupational Therapy Goal     OT, PT/OT Ongoing (interventions implemented as appropriate)    Description:  Goals to be met by: 3/29/2018     Patient will increase functional independence with ADLs by performing:    Grooming while seated with Contact Guard Assistance.  Sitting at edge of bed x20 minutes with Stand-by Assistance.  Rolling to Bilateral with Supervision.   Supine to sit with Contact Guard Assistance.  Toilet transfer to bedside commode  Assessment without distress.   Increased functional strength to 3/5 for B shlds.  Upper extremity exercise program 10 reps per handout, with assistance as needed.                      Time Tracking:     OT Date of Treatment: 03/20/18  OT Start Time: 1140 (1317 to 1312 got  pt back to bed after being up in chair for 1 hr)  OT Stop Time: 1218  OT Total Time (min): 38 min    Billable Minutes:Therapeutic Activity 38 plus 4= 42 minutes  Total Time cotx with PT 42 min    Jessica Gonsalves OT  3/20/2018

## 2018-03-20 NOTE — PLAN OF CARE
03/20/18 1119   Final Note   Assessment Type Final Discharge Note   Discharge Disposition Home-Health   What phone number can be called within the next 1-3 days to see how you are doing after discharge? [phone]   Hospital Follow Up  Appt(s) scheduled? Yes   Discharge plans and expectations educations in teach back method with documentation complete? Yes   Right Care Referral Info   Post Acute Recommendation Home-care

## 2018-03-20 NOTE — PLAN OF CARE
Problem: Occupational Therapy Goal  Goal: Occupational Therapy Goal  Goals to be met by: 3/29/2018     Patient will increase functional independence with ADLs by performing:    Grooming while seated with Contact Guard Assistance.  Sitting at edge of bed x20 minutes with Stand-by Assistance.  Rolling to Bilateral with Supervision.   Supine to sit with Contact Guard Assistance.  Toilet transfer to bedside commode  Assessment without distress.   Increased functional strength to 3/5 for B shlds.  Upper extremity exercise program 10 reps per handout, with assistance as needed.     Outcome: Ongoing (interventions implemented as appropriate)  Pt. making slow but steady gains toward goals.  SNF recommended.. high fall risk.  Continue with OT POC.

## 2018-03-20 NOTE — ASSESSMENT & PLAN NOTE
Telemetry Monitoring, Continue home Aspirin 81 mg daily, Atorvastatin 20 mg daily, Cilostazol 50 mg BID,

## 2018-03-20 NOTE — SUBJECTIVE & OBJECTIVE
Interval History: Still weak.    Review of Systems   Constitutional: Negative for chills and fever.   Respiratory: Negative for cough and shortness of breath.    Cardiovascular: Negative for chest pain and palpitations.   Gastrointestinal: Negative for nausea and vomiting.     Objective:     Vital Signs (Most Recent):  Temp: 98.2 °F (36.8 °C) (03/20/18 0743)  Pulse: 97 (03/20/18 1200)  Resp: 18 (03/20/18 0855)  BP: 99/62 (03/20/18 1150)  SpO2: 95 % (03/20/18 0855) Vital Signs (24h Range):  Temp:  [96.8 °F (36 °C)-98.3 °F (36.8 °C)] 98.2 °F (36.8 °C)  Pulse:  [] 97  Resp:  [15-18] 18  SpO2:  [95 %-96 %] 95 %  BP: ()/(59-75) 99/62     Weight: 81.2 kg (179 lb)  Body mass index is 28.04 kg/m².    Intake/Output Summary (Last 24 hours) at 03/20/18 1220  Last data filed at 03/20/18 0830   Gross per 24 hour   Intake              125 ml   Output             1110 ml   Net             -985 ml      Physical Exam   Constitutional: He appears well-developed and well-nourished. No distress.   Cardiovascular: Normal rate and regular rhythm.    Pulmonary/Chest: Effort normal and breath sounds normal. No respiratory distress.   Neurological: He is alert.   Skin: Skin is warm and dry.   Psychiatric: He has a normal mood and affect.   Nursing note and vitals reviewed.      Significant Labs:   CBC:     Recent Labs  Lab 03/18/18  1416   WBC 9.12   HGB 17.8   HCT 52.1        CMP:     Recent Labs  Lab 03/18/18  1416 03/19/18  0738 03/20/18  0514    140 139   K 4.4 3.7 3.4*    107 108   CO2 26 24 23   GLU 97 94 107   BUN 19 33* 42*   CREATININE 0.74 1.0 1.2   CALCIUM 10.1 9.1 9.0   PROT 9.8* 7.3  --    ALBUMIN 4.7 3.1* 2.8*   BILITOT 1.2* 0.8  --    ALKPHOS 122 79  --    AST 75* 32  --    ALT 25 21  --    ANIONGAP 17* 9 8   EGFRNONAA >60.0 >60 60     Magnesium:     Recent Labs  Lab 03/19/18  0738 03/20/18  0514   MG 2.0 2.0       Significant Imaging: I have reviewed all pertinent imaging results/findings  within the past 24 hours.

## 2018-03-20 NOTE — PLAN OF CARE
Problem: Patient Care Overview  Goal: Plan of Care Review  Outcome: Ongoing (interventions implemented as appropriate)  Pt on RA with sats of 95%. The proper method of use, as well as anticipated side effects, of this aerosol treatment are discussed and demonstrated to the patient. Will continue to monitor.

## 2018-03-20 NOTE — ASSESSMENT & PLAN NOTE
Old Lacunar Stroke  Generalized Muscle Weakness  Will likely need SNF placement. PT/OT Consult. His 3rd midnight will be complete on 3/22.

## 2018-03-20 NOTE — ASSESSMENT & PLAN NOTE
Generalized muscle weakness  Frequent falls  Weakness of left upper weakness  Right leg weakness  Chronic.  Continue PT/OT.

## 2018-03-20 NOTE — PLAN OF CARE
DC to SNF orders noted, patient still requires two more MN to qualify for SNF.       03/20/18 1113   Discharge Reassessment   Assessment Type Discharge Planning Reassessment   Discharge Plan A Skilled Nursing Facility

## 2018-03-20 NOTE — PROGRESS NOTES
"[facility]  Progress Note    Patient Name: Goyo Craig Jr.  MRN: [id number]  Patient Class: IP- Inpatient   Admission Date: 3/18/2018  Length of Stay: 1 days  Attending Physician: Manjeet Rangel MD  Primary Care Provider: Chrissy Hayes MD        Subjective:     Principal Problem:Traumatic rhabdomyolysis    HPI:  Goyo Craig Jr. is a 72 y.o. black man with hypertension, history of myocardial infarction status post coronary stent placement to [facility] on 7/20/07, left shoulder weakness since neck surgery, right lower extremity weakness requiring use of a walker, old lacunar strokes, bradycardia, history of vasovagal syncope, and benign prostatic hyperplasia.  He lives in [facility] in [place], [place].  His primary care physician is Dr. Chrissy Hayes.     He was taken to [facility] Emergency [facility] on Pk 3/18/18 after he fell on Friday 3/16/18 and no one had checked on him until the afternoon of presentation.  He had been lying on the ground, unable to get up.  He had no injuries found but had rhabdomyolysis with CPK of 1409.  He was given 500 mL of normal saline in the ED and admitted to [facility].    Assessment on admission:  "Open arrival to the unit, patient is oriented to person and place, but not time.  He states that he has been falling because his legs (and arms) stiffen and get week. He states that he just slept on the floor this weekend. He says 3 ladies were there when he fell once.  It sounds like this is a recurring thing. He can barely lift his extremities off the bed (all 4).   His is visibly dyspneic. He denies chest pain, fevers, abdominal pain, nausea or vomiting.  He will likely need more then assisted living upon discharge."    Hospital Course:  He was given IV fluids until rhabdomyolysis resolved, then awaited 3 midnights in the hospital to " qualify for skilled nursing facility placement.  On 3/20/18 he and his niece stated that he wanted to go home with home health instead, but wanted to stay another day in the hospital for physical therapy because he was still too weak.    Interval History: Still weak.    Review of Systems   Constitutional: Negative for chills and fever.   Respiratory: Negative for cough and shortness of breath.    Cardiovascular: Negative for chest pain and palpitations.   Gastrointestinal: Negative for nausea and vomiting.     Objective:     Vital Signs (Most Recent):  Temp: 98.2 °F (36.8 °C) (03/20/18 0743)  Pulse: 97 (03/20/18 1200)  Resp: 18 (03/20/18 0855)  BP: 99/62 (03/20/18 1150)  SpO2: 95 % (03/20/18 0855) Vital Signs (24h Range):  Temp:  [96.8 °F (36 °C)-98.3 °F (36.8 °C)] 98.2 °F (36.8 °C)  Pulse:  [] 97  Resp:  [15-18] 18  SpO2:  [95 %-96 %] 95 %  BP: ()/(59-75) 99/62     Weight: 81.2 kg (179 lb)  Body mass index is 28.04 kg/m².    Intake/Output Summary (Last 24 hours) at 03/20/18 1220  Last data filed at 03/20/18 0830   Gross per 24 hour   Intake              125 ml   Output             1110 ml   Net             -985 ml      Physical Exam   Constitutional: He appears well-developed and well-nourished. No distress.   Cardiovascular: Normal rate and regular rhythm.    Pulmonary/Chest: Effort normal and breath sounds normal. No respiratory distress.   Neurological: He is alert.   Skin: Skin is warm and dry.   Psychiatric: He has a normal mood and affect.   Nursing note and vitals reviewed.      Significant Labs:   CBC:     Recent Labs  Lab 03/18/18  1416   WBC 9.12   HGB 17.8   HCT 52.1        CMP:     Recent Labs  Lab 03/18/18  1416 03/19/18  0738 03/20/18  0514    140 139   K 4.4 3.7 3.4*    107 108   CO2 26 24 23   GLU 97 94 107   BUN 19 33* 42*   CREATININE 0.74 1.0 1.2   CALCIUM 10.1 9.1 9.0   PROT 9.8* 7.3  --    ALBUMIN 4.7 3.1* 2.8*   BILITOT 1.2* 0.8  --    ALKPHOS 122 79  --    AST  75* 32  --    ALT 25 21  --    ANIONGAP 17* 9 8   EGFRNONAA >60.0 >60 60     Magnesium:     Recent Labs  Lab 03/19/18  0738 03/20/18  0514   MG 2.0 2.0       Significant Imaging: I have reviewed all pertinent imaging results/findings within the past 24 hours.    Assessment/Plan:      BPH (benign prostatic hyperplasia)    Continue home tamsulosin 0.4 mg.          Debility    Generalized muscle weakness  Frequent falls  Weakness of left upper weakness  Right leg weakness  Chronic.  Continue PT/OT.          Coronary artery disease involving native coronary artery of native heart without angina pectoris    CAD S/P percutaneous coronary angioplasty  Continue home aspirin 81 mg daily.        Benign essential hypertension    Continue lisinopril.             VTE Risk Mitigation         Ordered     enoxaparin injection 40 mg  Daily     Route:  Subcutaneous        03/18/18 2046     IP VTE HIGH RISK PATIENT  Once      03/18/18 2046              Manjeet Rangel MD  Department of Hospital Medicine   [facility]

## 2018-03-20 NOTE — PLAN OF CARE
03/20/18 1119   Final Note   Assessment Type Final Discharge Note   Discharge Disposition Home-Health   What phone number can be called within the next 1-3 days to see how you are doing after discharge? [phone]   Hospital Follow Up  Appt(s) scheduled? Yes   Discharge plans and expectations educations in teach back method with documentation complete? Yes   Right Care Referral Info   Post Acute Recommendation Home-care

## 2018-03-21 PROBLEM — J18.9 PNEUMONIA OF LEFT LOWER LOBE DUE TO INFECTIOUS ORGANISM: Status: ACTIVE | Noted: 2018-03-21

## 2018-03-21 PROBLEM — R50.9 FEVER: Status: ACTIVE | Noted: 2018-03-21

## 2018-03-21 PROBLEM — R41.3 MEMORY LOSS: Chronic | Status: ACTIVE | Noted: 2018-03-21

## 2018-03-21 LAB
BASOPHILS # BLD AUTO: 0.01 K/UL
BASOPHILS NFR BLD: 0.1 %
DIFFERENTIAL METHOD: ABNORMAL
EOSINOPHIL # BLD AUTO: 0.1 K/UL
EOSINOPHIL NFR BLD: 2 %
ERYTHROCYTE [DISTWIDTH] IN BLOOD BY AUTOMATED COUNT: 14.5 %
HCT VFR BLD AUTO: 37.8 %
HGB BLD-MCNC: 12.3 G/DL
LYMPHOCYTES # BLD AUTO: 1.5 K/UL
LYMPHOCYTES NFR BLD: 20.5 %
MCH RBC QN AUTO: 27.6 PG
MCHC RBC AUTO-ENTMCNC: 32.5 G/DL
MCV RBC AUTO: 85 FL
MONOCYTES # BLD AUTO: 0.8 K/UL
MONOCYTES NFR BLD: 11.4 %
NEUTROPHILS # BLD AUTO: 4.7 K/UL
NEUTROPHILS NFR BLD: 65.6 %
PLATELET # BLD AUTO: 165 K/UL
PMV BLD AUTO: 10 FL
POCT GLUCOSE: 110 MG/DL (ref 70–110)
POCT GLUCOSE: 126 MG/DL (ref 70–110)
POCT GLUCOSE: 87 MG/DL (ref 70–110)
POCT GLUCOSE: 88 MG/DL (ref 70–110)
POCT GLUCOSE: 93 MG/DL (ref 70–110)
PROCALCITONIN SERPL IA-MCNC: 0.07 NG/ML
RBC # BLD AUTO: 4.46 M/UL
WBC # BLD AUTO: 7.11 K/UL

## 2018-03-21 PROCEDURE — 25000003 PHARM REV CODE 250: Performed by: HOSPITALIST

## 2018-03-21 PROCEDURE — 27000221 HC OXYGEN, UP TO 24 HOURS

## 2018-03-21 PROCEDURE — 97535 SELF CARE MNGMENT TRAINING: CPT

## 2018-03-21 PROCEDURE — 25000003 PHARM REV CODE 250: Performed by: NURSE PRACTITIONER

## 2018-03-21 PROCEDURE — 85025 COMPLETE CBC W/AUTO DIFF WBC: CPT

## 2018-03-21 PROCEDURE — 84145 PROCALCITONIN (PCT): CPT

## 2018-03-21 PROCEDURE — 87040 BLOOD CULTURE FOR BACTERIA: CPT

## 2018-03-21 PROCEDURE — 94761 N-INVAS EAR/PLS OXIMETRY MLT: CPT

## 2018-03-21 PROCEDURE — S4991 NICOTINE PATCH NONLEGEND: HCPCS | Performed by: NURSE PRACTITIONER

## 2018-03-21 PROCEDURE — 97530 THERAPEUTIC ACTIVITIES: CPT

## 2018-03-21 PROCEDURE — 11000001 HC ACUTE MED/SURG PRIVATE ROOM

## 2018-03-21 PROCEDURE — 36415 COLL VENOUS BLD VENIPUNCTURE: CPT

## 2018-03-21 PROCEDURE — 63600175 PHARM REV CODE 636 W HCPCS: Performed by: HOSPITALIST

## 2018-03-21 RX ORDER — AMOXICILLIN AND CLAVULANATE POTASSIUM 875; 125 MG/1; MG/1
1 TABLET, FILM COATED ORAL EVERY 12 HOURS
Status: DISCONTINUED | OUTPATIENT
Start: 2018-03-21 | End: 2018-03-23 | Stop reason: HOSPADM

## 2018-03-21 RX ADMIN — LISINOPRIL 5 MG: 5 TABLET ORAL at 10:03

## 2018-03-21 RX ADMIN — CILOSTAZOL 50 MG: 50 TABLET ORAL at 09:03

## 2018-03-21 RX ADMIN — AMOXICILLIN AND CLAVULANATE POTASSIUM 1 TABLET: 875; 125 TABLET, FILM COATED ORAL at 10:03

## 2018-03-21 RX ADMIN — ENOXAPARIN SODIUM 40 MG: 100 INJECTION SUBCUTANEOUS at 10:03

## 2018-03-21 RX ADMIN — NICOTINE 1 PATCH: 21 PATCH, EXTENDED RELEASE TRANSDERMAL at 10:03

## 2018-03-21 RX ADMIN — ASPIRIN 81 MG: 81 TABLET, COATED ORAL at 10:03

## 2018-03-21 RX ADMIN — TAMSULOSIN HYDROCHLORIDE 0.4 MG: 0.4 CAPSULE ORAL at 10:03

## 2018-03-21 RX ADMIN — AMOXICILLIN AND CLAVULANATE POTASSIUM 1 TABLET: 875; 125 TABLET, FILM COATED ORAL at 09:03

## 2018-03-21 RX ADMIN — ACETAMINOPHEN 650 MG: 325 TABLET ORAL at 01:03

## 2018-03-21 RX ADMIN — ATORVASTATIN CALCIUM 20 MG: 20 TABLET, FILM COATED ORAL at 10:03

## 2018-03-21 NOTE — PROGRESS NOTES
"[facility] Hospital Medicine  Progress Note    Patient Name: Goyo Craig Jr.  MRN: [id number]  Patient Class: IP- Inpatient   Admission Date: 3/18/2018  Length of Stay: 2 days  Attending Physician: Manjeet Rangel MD  Primary Care Provider: Chrissy Hayes MD        Subjective:     Principal Problem:Traumatic rhabdomyolysis    HPI:  Goyo Craig Jr. is a 72 y.o. black man with hypertension, history of myocardial infarction status post coronary stent placement to [facility] on 7/20/07, left shoulder weakness since neck surgery, right lower extremity weakness requiring use of a walker, old lacunar strokes, bidirectional atrial shunt, diastolic dysfunction, bradycardia, history of vasovagal syncope, and benign prostatic hyperplasia.  He lives in [facility] in [place], [place].  His primary care physician is Dr. Chrissy Hayes.     He was taken to [facility] on Pk 3/18/18 after he fell on Friday 3/16/18 and no one had checked on him until the afternoon of presentation.  He had been lying on the ground, unable to get up.  He had no injuries found but had rhabdomyolysis with CPK of 1409.  He was given 500 mL of normal saline in the ED and admitted to [facility].    Assessment on admission:  "Open arrival to the unit, patient is oriented to person and place, but not time.  He states that he has been falling because his legs (and arms) stiffen and get week. He states that he just slept on the floor this weekend. He says 3 ladies were there when he fell once.  It sounds like this is a recurring thing. He can barely lift his extremities off the bed (all 4).   His is visibly dyspneic. He denies chest pain, fevers, abdominal pain, nausea or vomiting.  He will likely need more then assisted living upon discharge."    Hospital Course:  He was given IV fluids until rhabdomyolysis " resolved, then awaited 3 midnights in the hospital to qualify for skilled nursing facility placement.  On 3/20/18 he and his niece stated that he wanted to go home with home health instead, but wanted to stay another day in the hospital for physical therapy because he was still too weak.  At 1:05 am on 3/21/18, he began having fever.  Repeat chest x-ray showed a developing left lower lobe pneumonia.  He displayed poor memory, which did not concern his family members as it is apparently his baseline.      Interval History: Does not remember anyone or anything from the day before.    Review of Systems   Constitutional: Negative for chills and fever.   Respiratory: Negative for cough and shortness of breath.    Cardiovascular: Negative for chest pain and palpitations.   Gastrointestinal: Negative for nausea and vomiting.     Objective:     Vital Signs (Most Recent):  Temp: 98.4 °F (36.9 °C) (03/21/18 0751)  Pulse: 95 (03/21/18 0800)  Resp: 16 (03/21/18 0751)  BP: (!) 149/86 (03/21/18 0751)  SpO2: 96 % (03/21/18 0612) Vital Signs (24h Range):  Temp:  [97.3 °F (36.3 °C)-101.8 °F (38.8 °C)] 98.4 °F (36.9 °C)  Pulse:  [] 95  Resp:  [16-18] 16  SpO2:  [85 %-98 %] 96 %  BP: ()/(62-96) 149/86     Weight: 81.2 kg (179 lb)  Body mass index is 28.04 kg/m².    Intake/Output Summary (Last 24 hours) at 03/21/18 1019  Last data filed at 03/21/18 0700   Gross per 24 hour   Intake             1990 ml   Output             1317 ml   Net              673 ml      Physical Exam   Constitutional: He appears well-developed and well-nourished. No distress.   Cardiovascular: Normal rate and regular rhythm.    Pulmonary/Chest: Effort normal and breath sounds normal. No respiratory distress.   Neurological: He is alert.   Skin: Skin is warm and dry.   Psychiatric: He has a normal mood and affect. Cognition and memory are impaired.   Nursing note and vitals reviewed.      Significant Labs:   CBC:     Recent Labs  Lab 03/21/18  0633    WBC 7.11   HGB 12.3*   HCT 37.8*        CMP:     Recent Labs  Lab 03/20/18  0514      K 3.4*      CO2 23      BUN 42*   CREATININE 1.2   CALCIUM 9.0   ALBUMIN 2.8*   ANIONGAP 8   EGFRNONAA 60     Magnesium:     Recent Labs  Lab 03/20/18  0514   MG 2.0       Significant Imaging: I have reviewed all pertinent imaging results/findings within the past 24 hours.   X-Ray Chest 1 View 3/21/18: Heart size and pulmonary vascularity are similar.  There is diffuse increase in interstitial markings with some patchy increased consolidation at the left base obscuring the left hemidiaphragm.  No pneumothorax.  Advanced degenerative changes at the shoulders.  Impression: Findings most consistent with developing left lower lobe pneumonic infiltrate.  There is diffuse abnormal interstitial pattern as well.    Assessment/Plan:      Memory loss    Delirium precautions.          Pneumonia of left lower lobe due to infectious organism    Fever  Give moxifloxacin.          BPH (benign prostatic hyperplasia)    Continue home tamsulosin 0.4 mg.          Debility    Generalized muscle weakness  Frequent falls  Weakness of left upper weakness  Right leg weakness  Chronic.  Continue PT/OT.          Coronary artery disease involving native coronary artery of native heart without angina pectoris    CAD S/P percutaneous coronary angioplasty  Continue home aspirin 81 mg daily.        Benign essential hypertension    Continue lisinopril.             VTE Risk Mitigation         Ordered     enoxaparin injection 40 mg  Daily     Route:  Subcutaneous        03/18/18 2046     IP VTE HIGH RISK PATIENT  Once      03/18/18 2046        Disposition: Wants to go back to assisted living with home health, but would do better at SNF because he is chronically unsafe to be at home without 24-hour care.      Manjeet Rangel MD  Department of Hospital Medicine   [facility]

## 2018-03-21 NOTE — PLAN OF CARE
TN spoke with patient who gave permission to speak with brother about DC plan, TN called brother:  Tyler Craig Brother     [phone]     Tyler states that he has positioned all the help for this patient at [facility] and supports his brother's decision to DC home.  TN explained that it is important that he come and see his brother for himself, see what he is capable of and what he is not doing.  TN explained SNF to the brother and he verbalized understanding.  He agreed to come meet with TN, SAQIB and patient tomorrow after 11am after CM meeting.    TN notified KOKI Staley, may need to get more choices if the brother agrees to encourage SNF placement.       03/21/18 1534   Discharge Reassessment   Assessment Type Discharge Planning Reassessment   Discharge Plan A Skilled Nursing Facility   Discharge Plan B Independent living facility;Home Health

## 2018-03-21 NOTE — PT/OT/SLP PROGRESS
"Physical Therapy Treatment    Patient Name:  Goyo Craig Jr.   MRN:  [id number]    Recommendations:     Discharge Recommendations:  nursing facility, skilled   Discharge Equipment Recommendations: wheelchair, hospital bed   Barriers to discharge: Inaccessible home and Decreased caregiver support    Assessment:     Goyo Craig Jr. is a 72 y.o. male admitted with a medical diagnosis of Traumatic rhabdomyolysis.  He presents with the following impairments/functional limitations:  weakness, impaired endurance, impaired self care skills, impaired functional mobilty, gait instability, impaired balance, decreased lower extremity function, decreased upper extremity function, decreased safety awareness, decreased ROM, impaired coordination, impaired cognition. Pt leaning R or L in sitting requiring up to mod A to maintain midline positioning. Pt follows cues minimally and required max A to stand and dependent assist for squat pivot transfers from bed<>chair.    Rehab Prognosis: Good; patient would benefit from acute skilled PT services to address these deficits and reach maximum level of function.      Recent Surgery: * No surgery found *      Plan:     During this hospitalization, patient to be seen 6 x/week to address the above listed problems via gait training, therapeutic activities, therapeutic exercises, neuromuscular re-education  · Plan of Care Expires:  04/19/18   Plan of Care Reviewed with: patient    Subjective     Communicated with MIRA Rene prior to session.  Patient found supine with HOB elevated upon PT entry to room, agreeable to treatment.      Chief Complaint: pt is confused and blaming therapist for his leaning in sitting and inability to assist with mobility- pt says this in sort of a joking manor, reporting "i'm not going to blame myself, I'm the patient."  Pain/Comfort:  · Pain Rating 1: 0/10  · Pain Rating Post-Intervention 1: 0/10    Patients cultural, spiritual, Scientology conflicts given the current " situation: none reported to PT    Objective:     Patient found with: bed alarm, telemetry     General Precautions: Standard, fall   Orthopedic Precautions:N/A   Braces: N/A     Functional Mobility:  · Bed Mobility:     · Rolling Right: moderate assistance  · Scooting: maximal assistance  · Supine to Sit: maximal assistance  · Sit to Supine: moderate assistance  · Transfers:     · Sit to Stand:  maximal assistance with no AD and rolling walker  · Bed to Chair: dependent assistance with  no AD  using  Squat Pivot      AM-PAC 6 CLICK MOBILITY  Turning over in bed (including adjusting bedclothes, sheets and blankets)?: 2  Sitting down on and standing up from a chair with arms (e.g., wheelchair, bedside commode, etc.): 2  Moving from lying on back to sitting on the side of the bed?: 2  Moving to and from a bed to a chair (including a wheelchair)?: 2  Need to walk in hospital room?: 2  Climbing 3-5 steps with a railing?: 1  Total Score: 11       Therapeutic Activities and Exercises:  Pt leaning right initially upon sitting EOB, instructed in leaning left onto forearm then to sit in midline positioning and then pt leaning to the left. Pt with significant flexed posture and requires max cues but does not correct.   Pt completed 1st stand with RW and attempts SPT to sit in chair but pt unable to raise feet off the floor to take a step and does not follow cues to slide R foot, only able to slide L foot minimally. RW removed and provided pt with B HHA with no improvements. Pt returned to sit EOB. Completed next stand with Pt positioned in front of pt and pt required dependent assist for SqPT. Upon sitting in chair pt incontinent of bladder. Completed another squat stand from chair with max A to change pads and for hygiene. Pt left sitting up in recliner chair.  Pt tolerated ~2 hours of sitting up in chair. PT returned to assist pt with transfer back to bed.    Patient left HOB elevated with all lines intact, call button in  reach, bed alarm on and RN notified..    GOALS:    Physical Therapy Goals        Problem: Physical Therapy Goal    Goal Priority Disciplines Outcome Goal Variances Interventions   Physical Therapy Goal     PT/OT, PT Ongoing (interventions implemented as appropriate)     Description:  Goals to be met by: 18     Patient will increase functional independence with mobility by performin. Supine <> sit with MInimal Assistance  2. Sit to stand transfer with Minimal Assistance  3. Bed to chair transfer with Minimal Assistance using Rolling Walker  4. Gait  x 25 feet with Minimal Assistance using Rolling Walker.   5. Sitting at edge of bed x 15 minutes with Stand-by Assistance                       Time Tracking:     PT Received On: 18  PT Start Time: 0955   PM Start Session: 1241  PT Stop Time: 1033   PM Stop Session: 1250  PT Total Time (min): 47 min (first session portion with Ot) - 32 mins PT time    Billable Minutes: Therapeutic Activity 32    Treatment Type: Treatment  PT/PTA: PT     PTA Visit Number: 0     Fariha Shah, PT  2018

## 2018-03-21 NOTE — PT/OT/SLP PROGRESS
Occupational Therapy   Treatment    Name: Goyo Craig Jr.  MRN: [id number]  Admitting Diagnosis:  Traumatic rhabdomyolysis       Recommendations:     Discharge Recommendations: nursing facility, skilled  Discharge Equipment Recommendations:  wheelchair, hospital bed  Barriers to discharge:  Decreased caregiver support    Subjective     Communicated with: nurseAnju prior to session.  Pain/Comfort:  · Pain Rating 1: 0/10  · Pain Rating Post-Intervention 1: 0/10    Patients cultural, spiritual, Restorationist conflicts given the current situation:  (none reported)    Objective:     Patient found with: bed alarm, telemetry    General Precautions: Standard, fall   Orthopedic Precautions:N/A   Braces: N/A     Bed Mobility:    · Seated EOB with PT on arrival    Functional Mobility/Transfers:  · Patient completed Sit <> Stand Transfer with maximal assistance  with  rolling walker   · Patient completed Bed <> Chair Transfer using Stand Pivot technique with maximal assistance with no assistive device    Activities of Daily Living:  · Toileting: total assistance patient incontinent while seated in chair; urinated on floor    Patient left up in chair with all lines intact, call button in reach and RN notified    [facility] 6 Click:  [facility] Total Score: 11    Treatment & Education:  Patient with poor static sitting balance - demonstrating B lean at times, more prominent to L. Patient with WBing onto L forearm to reduce lean, but with little improvement noted. Sit <> stand using RW with max (A), 2nd person for safety. Max t/f to chair 2* patient not assisting with t/f or attempt to step to chair. Patient incontinent of urine on self and floor; total (A) for hygiene.  Education:    Assessment:    Patient with poor sitting balance this date. Will benefit from continued skilled OT to address functional deficits.     Goyo Craig Jr. is a 72 y.o. male with a medical diagnosis of Traumatic rhabdomyolysis.  Performance deficits affecting  function are weakness, impaired endurance, impaired self care skills, impaired functional mobilty, gait instability, impaired balance, decreased upper extremity function, decreased lower extremity function, impaired cognition, decreased safety awareness, decreased ROM.      Rehab Prognosis:  Fair; patient would benefit from acute skilled OT services to address these deficits and reach maximum level of function.       Plan:     Patient to be seen 5 x/week to address the above listed problems via self-care/home management, therapeutic activities, therapeutic exercises  · Plan of Care Expires: 04/19/18  · Plan of Care Reviewed with: patient    This Plan of care has been discussed with the patient who was involved in its development and understands and is in agreement with the identified goals and treatment plan    GOALS:    Occupational Therapy Goals        Problem: Occupational Therapy Goal    Goal Priority Disciplines Outcome Interventions   Occupational Therapy Goal     OT, PT/OT Ongoing (interventions implemented as appropriate)    Description:  Goals to be met by: 3/29/2018     Patient will increase functional independence with ADLs by performing:    Grooming while seated with Contact Guard Assistance.  Sitting at edge of bed x20 minutes with Stand-by Assistance.  Rolling to Bilateral with Supervision.   Supine to sit with Contact Guard Assistance.  Toilet transfer to bedside commode  Assessment without distress.   Increased functional strength to 3/5 for B shlds.  Upper extremity exercise program 10 reps per handout, with assistance as needed.                       Time Tracking:     OT Date of Treatment: 03/21/18  OT Start Time: 1006  OT Stop Time: 1033  OT Total Time (min): 27 min co-tx with PT    Billable Minutes:Self Care/Home Management 15    PANCHO Hendrix  3/21/2018

## 2018-03-21 NOTE — PLAN OF CARE
Problem: Patient Care Overview  Goal: Plan of Care Review  Outcome: Revised  Pt stable. NO distress noted. POC reviewed with pt. Pt verbalized understanding. Pt remains SR on the monitor. NO true red alarms noted. Pt denied pain. Tylenol administered for 101.8 temp. Blood cultures drawn. Pt turned throughout the night. Pt encourage to use the urinal. Fall precautions maintained. Bed in lowest position, call light in reach and bed alarm on.

## 2018-03-21 NOTE — PLAN OF CARE
Continued on telemetry an d fall risk monitoring,No true red alarm ectopy.bed alarm on. Seen by Dr Rangel.Varun forgetful ,re-oriented with limited success.Asking to go home.

## 2018-03-21 NOTE — PLAN OF CARE
Problem: Occupational Therapy Goal  Goal: Occupational Therapy Goal  Goals to be met by: 3/29/2018     Patient will increase functional independence with ADLs by performing:    Grooming while seated with Contact Guard Assistance.  Sitting at edge of bed x20 minutes with Stand-by Assistance.  Rolling to Bilateral with Supervision.   Supine to sit with Contact Guard Assistance.  Toilet transfer to bedside commode  Assessment without distress.   Increased functional strength to 3/5 for B shlds.  Upper extremity exercise program 10 reps per handout, with assistance as needed.     Outcome: Ongoing (interventions implemented as appropriate)  Patient with poor sitting balance this date. Will benefit from continued skilled OT to address functional deficits.

## 2018-03-21 NOTE — PLAN OF CARE
Nurse spoke with Dr. Rangel regarding pts temp of 101.8. Blood cultures will be order. Nurse will continue to assess.

## 2018-03-21 NOTE — SUBJECTIVE & OBJECTIVE
Interval History: Does not remember anyone or anything from the day before.    Review of Systems   Constitutional: Negative for chills and fever.   Respiratory: Negative for cough and shortness of breath.    Cardiovascular: Negative for chest pain and palpitations.   Gastrointestinal: Negative for nausea and vomiting.     Objective:     Vital Signs (Most Recent):  Temp: 98.4 °F (36.9 °C) (03/21/18 0751)  Pulse: 95 (03/21/18 0800)  Resp: 16 (03/21/18 0751)  BP: (!) 149/86 (03/21/18 0751)  SpO2: 96 % (03/21/18 0612) Vital Signs (24h Range):  Temp:  [97.3 °F (36.3 °C)-101.8 °F (38.8 °C)] 98.4 °F (36.9 °C)  Pulse:  [] 95  Resp:  [16-18] 16  SpO2:  [85 %-98 %] 96 %  BP: ()/(62-96) 149/86     Weight: 81.2 kg (179 lb)  Body mass index is 28.04 kg/m².    Intake/Output Summary (Last 24 hours) at 03/21/18 1019  Last data filed at 03/21/18 0700   Gross per 24 hour   Intake             1990 ml   Output             1317 ml   Net              673 ml      Physical Exam   Constitutional: He appears well-developed and well-nourished. No distress.   Cardiovascular: Normal rate and regular rhythm.    Pulmonary/Chest: Effort normal and breath sounds normal. No respiratory distress.   Neurological: He is alert.   Skin: Skin is warm and dry.   Psychiatric: He has a normal mood and affect. Cognition and memory are impaired.   Nursing note and vitals reviewed.      Significant Labs:   CBC:     Recent Labs  Lab 03/21/18  0633   WBC 7.11   HGB 12.3*   HCT 37.8*        CMP:     Recent Labs  Lab 03/20/18  0514      K 3.4*      CO2 23      BUN 42*   CREATININE 1.2   CALCIUM 9.0   ALBUMIN 2.8*   ANIONGAP 8   EGFRNONAA 60     Magnesium:     Recent Labs  Lab 03/20/18  0514   MG 2.0       Significant Imaging: I have reviewed all pertinent imaging results/findings within the past 24 hours.   X-Ray Chest 1 View 3/21/18: Heart size and pulmonary vascularity are similar.  There is diffuse increase in interstitial  markings with some patchy increased consolidation at the left base obscuring the left hemidiaphragm.  No pneumothorax.  Advanced degenerative changes at the shoulders.  Impression: Findings most consistent with developing left lower lobe pneumonic infiltrate.  There is diffuse abnormal interstitial pattern as well.

## 2018-03-21 NOTE — PLAN OF CARE
Seen by PT.Took max assist to transfer to chair .Patient not following instructions or able to stand on his own. Coughing productively but swallowing own sputum. Container for sputum collection at bedside. Mepilex in place over coccyx. Pox on RA 95-96%Hob elevated Incontinent of urine.

## 2018-03-21 NOTE — PLAN OF CARE
Problem: Physical Therapy Goal  Goal: Physical Therapy Goal  Goals to be met by: 18     Patient will increase functional independence with mobility by performin. Supine <> sit with MInimal Assistance  2. Sit to stand transfer with Minimal Assistance  3. Bed to chair transfer with Minimal Assistance using Rolling Walker  4. Gait  x 25 feet with Minimal Assistance using Rolling Walker.   5. Sitting at edge of bed x 15 minutes with Stand-by Assistance     Outcome: Ongoing (interventions implemented as appropriate)  Pt continues to present with impaired cognition, safety concerns, and provides minimal assistance with mobility. Pt requires max A to stand and dependent assist to complete squat pivot transfers. Recommending SNF placement upon d/c as pt is unsafe to d/c back to AL facility alone.

## 2018-03-21 NOTE — PROGRESS NOTES
The Sw received a call from Pablo at [facility] [place]([phone])inquiring about the pt and the snf referral. The Sw informed her the pt wants to go home but has been encouraged to consider snf. The Sw asked her to still keep the pt's info and she will call her back after speaking with the pt and his family.

## 2018-03-22 VITALS
HEART RATE: 68 BPM | BODY MASS INDEX: 25.06 KG/M2 | HEIGHT: 71 IN | TEMPERATURE: 99 F | SYSTOLIC BLOOD PRESSURE: 171 MMHG | RESPIRATION RATE: 16 BRPM | OXYGEN SATURATION: 94 % | WEIGHT: 179 LBS | DIASTOLIC BLOOD PRESSURE: 89 MMHG

## 2018-03-22 PROBLEM — R50.9 FEVER: Status: RESOLVED | Noted: 2018-03-21 | Resolved: 2018-03-22

## 2018-03-22 LAB — POCT GLUCOSE: 103 MG/DL (ref 70–110)

## 2018-03-22 PROCEDURE — 94761 N-INVAS EAR/PLS OXIMETRY MLT: CPT

## 2018-03-22 PROCEDURE — 63600175 PHARM REV CODE 636 W HCPCS: Performed by: HOSPITALIST

## 2018-03-22 PROCEDURE — 97530 THERAPEUTIC ACTIVITIES: CPT

## 2018-03-22 PROCEDURE — 25000003 PHARM REV CODE 250: Performed by: NURSE PRACTITIONER

## 2018-03-22 PROCEDURE — 11000001 HC ACUTE MED/SURG PRIVATE ROOM

## 2018-03-22 PROCEDURE — 25000003 PHARM REV CODE 250: Performed by: HOSPITALIST

## 2018-03-22 PROCEDURE — 27000221 HC OXYGEN, UP TO 24 HOURS

## 2018-03-22 RX ORDER — BISACODYL 10 MG
10 SUPPOSITORY, RECTAL RECTAL ONCE
Status: DISCONTINUED | OUTPATIENT
Start: 2018-03-22 | End: 2018-03-23 | Stop reason: HOSPADM

## 2018-03-22 RX ORDER — POLYETHYLENE GLYCOL 3350 17 G/17G
17 POWDER, FOR SOLUTION ORAL ONCE
Status: COMPLETED | OUTPATIENT
Start: 2018-03-22 | End: 2018-03-22

## 2018-03-22 RX ORDER — LACTULOSE 10 G/15ML
15 SOLUTION ORAL ONCE
Status: COMPLETED | OUTPATIENT
Start: 2018-03-22 | End: 2018-03-22

## 2018-03-22 RX ORDER — AMOXICILLIN AND CLAVULANATE POTASSIUM 875; 125 MG/1; MG/1
1 TABLET, FILM COATED ORAL EVERY 12 HOURS
Qty: 7 TABLET | Refills: 0 | Status: SHIPPED | OUTPATIENT
Start: 2018-03-22 | End: 2018-03-26

## 2018-03-22 RX ADMIN — AMOXICILLIN AND CLAVULANATE POTASSIUM 1 TABLET: 875; 125 TABLET, FILM COATED ORAL at 08:03

## 2018-03-22 RX ADMIN — LISINOPRIL 5 MG: 5 TABLET ORAL at 08:03

## 2018-03-22 RX ADMIN — ASPIRIN 81 MG: 81 TABLET, COATED ORAL at 08:03

## 2018-03-22 RX ADMIN — ENOXAPARIN SODIUM 40 MG: 100 INJECTION SUBCUTANEOUS at 08:03

## 2018-03-22 RX ADMIN — CILOSTAZOL 50 MG: 50 TABLET ORAL at 08:03

## 2018-03-22 RX ADMIN — TAMSULOSIN HYDROCHLORIDE 0.4 MG: 0.4 CAPSULE ORAL at 08:03

## 2018-03-22 RX ADMIN — CILOSTAZOL 50 MG: 50 TABLET ORAL at 09:03

## 2018-03-22 RX ADMIN — LACTULOSE 15 G: 20 SOLUTION ORAL at 05:03

## 2018-03-22 RX ADMIN — ATORVASTATIN CALCIUM 20 MG: 20 TABLET, FILM COATED ORAL at 08:03

## 2018-03-22 RX ADMIN — AMOXICILLIN AND CLAVULANATE POTASSIUM 1 TABLET: 875; 125 TABLET, FILM COATED ORAL at 09:03

## 2018-03-22 RX ADMIN — POLYETHYLENE GLYCOL 3350 17 G: 17 POWDER, FOR SOLUTION ORAL at 05:03

## 2018-03-22 NOTE — PLAN OF CARE
Problem: Patient Care Overview  Goal: Plan of Care Review  Outcome: Ongoing (interventions implemented as appropriate)  Received pt on 1L NC; no distress noted

## 2018-03-22 NOTE — PLAN OF CARE
"[facility]  Georges Montenegro MD, [facility]     MD David Dunne FNP Renee Melancon, PA-C Rosanne Zeringue, NP  [address] [place] [id number]  Office: [phone]  Fax: [phone]       NURSING HOME ORDERS    Patient Name: Goyo Craig Jr.  YOB: [birth year]    Admit to Nursing Home:   Skilled Bed                                         Diagnoses:  Active Hospital Problems    Diagnosis  POA    Pneumonia of left lower lobe due to infectious organism [J18.1]  Yes    Frequent falls [R29.6]  Not Applicable    BPH (benign prostatic hyperplasia) [N40.0]  Yes     Chronic    Debility [R53.81]  Yes    Coronary artery disease involving native coronary artery of native heart without angina pectoris [I25.10]  Yes     Chronic    Generalized muscle weakness [M62.81]  Yes    Vasovagal syncope [R55]  Yes     Chronic    Right leg weakness [R29.898]  Yes     Chronic    Weakness of left upper extremity [R29.898]  Yes     Chronic     After left neck surgery      Benign essential hypertension [I10]  Yes     Chronic    Old lacunar stroke without late effect [Z86.73]  Not Applicable     Chronic    CAD S/P percutaneous coronary angioplasty [I25.10, Z98.61]  Not Applicable     Chronic      Resolved Hospital Problems    Diagnosis Date Resolved POA    *Traumatic rhabdomyolysis [T79.6XXA] 03/20/2018 Yes    Fever [R50.9] 03/22/2018 No       Allergies:Review of patient's allergies indicates:  No Known Allergies      Discharge Procedure Orders  WHEELCHAIR FOR HOME USE   Order Specific Question Answer Comments   Hours in W/C per day: 12    Type of Wheelchair: Standard    Size(Width): 18"(STD adult)    Leg Support: STD footrests    Lap Belt: Velcro    Cushion: Basic    Height: average    Weight: 81.2 kg (179 lb)    Does patient have medical equipment at home? bedside commode    Does patient have medical equipment at home? rollator  "   Does patient have medical equipment at home? shower chair    Length of need (1-99 months): 99    Please check all that apply: Caregiver is capable and willing to operate wheelchair safely.      Diet Cardiac     Diet Cardiac     Activity as tolerated     Other restrictions (specify):   Order Comments: Use your cane and have someone around who can help you     Notify your health care provider if you experience any of the following:  increased confusion or weakness     Notify your health care provider if you experience any of the following:  persistent dizziness, light-headedness, or visual disturbances     Activity as tolerated     Notify your health care provider if you experience any of the following:  increased confusion or weakness         Acitivities:    - Up in a chair each morning as tolerated  - Ambulate with assistance to bathroom  - Scheduled walks once each shift (every 8 hours)  - May use walker, cane, or self-propelled wheelchair       - Weight bearing: full    LABS:  Per facility protocol    Nursing Precautions:        - Fall precautions per nursing home protocol    CONSULTS:      Physical Therapy to evaluate and treat     Occupational Therapy to evaluate and treat        Medications: Discontinue all previous medication orders, if any. See new list below.   Goyo Craig Jr.   Home Medication Instructions EDWAR:[id number]    Printed on:03/22/18 1432   Medication Information                      amoxicillin-clavulanate 875-125mg (AUGMENTIN) 875-125 mg per tablet  Take 1 tablet by mouth every 12 (twelve) hours until 3/25/17.             aspirin (ECOTRIN) 81 MG EC tablet  Take 81 mg by mouth once daily.             baclofen (LIORESAL) 10 MG tablet  Take 10 mg by mouth 3 (three) times daily as needed (muscle spasm).             cilostazol (PLETAL) 100 MG Tab  Take 100 mg by mouth 2 (two) times daily.              lisinopril 10 MG tablet  Take 10 mg by mouth once daily.             meloxicam (MOBIC) 7.5 MG  tablet  Take 7.5 mg by mouth 2 (two) times daily. And as needed for arthritis pain             tamsulosin (FLOMAX) 0.4 mg Cp24  Take 0.4 mg by mouth once daily.                       _________________________________  Manjeet Rangel MD  03/22/2018

## 2018-03-22 NOTE — PROGRESS NOTES
The Sw was just informed via [facility] they are waiting on Dr. Redd to see if he will accept the pt at [facility] for swing bed.

## 2018-03-22 NOTE — PROGRESS NOTES
The pt has been accepted to [facility] swing bed per [facility]. The Sw informed the pt's niece Nisha and she's in agreement with the d/c plan to [facility] today and states she will inform the rest of the family. The SW spoke to Dr. Rangel and he will write the d/c orders.    2:45pm The Sw faxed the d/c orders to Xu at [facility] via [facility].

## 2018-03-22 NOTE — PLAN OF CARE
03/22/18 1628   Final Note   Assessment Type Final Discharge Note   Discharge Disposition Swing Bed   [facility] Referral Info   Post Acute Recommendation SNF / Sub-Acute Rehab   Referral Type snf/swing bed   Facility Name [facility]

## 2018-03-22 NOTE — PROGRESS NOTES
The Sw spoke to the pt's niece Nisha in reference to the Life Alert b/c the pt has one in his apartment at [facility] but it was broken during the time of his fall. The office stated they knew someone required assistance but they weren't able to locate where it was coming from. The family reported this to management who was very upset and assured the pt and the family the problem will be corrected. The Sw referred the pt to [facility]([phone])spoke to Bryanna who states the pt will be assigned a  who will assist with community resources and the Life Alert with [employer] on call($22 monthly monitoring fee). Bryanna was given the contact info for Nisha and Amarilys as the contacts.

## 2018-03-22 NOTE — PLAN OF CARE
"Problem: Occupational Therapy Goal  Goal: Occupational Therapy Goal  Goals to be met by: 3/29/2018     Patient will increase functional independence with ADLs by performing:    Grooming while seated with Contact Guard Assistance.  Sitting at edge of bed x20 minutes with Stand-by Assistance.  Rolling to Bilateral with Supervision.   Supine to sit with Contact Guard Assistance.  Toilet transfer to bedside commode  Assessment without distress.   Increased functional strength to 3/5 for B shlds.  Upper extremity exercise program 10 reps per handout, with assistance as needed.      Outcome: Ongoing (interventions implemented as appropriate)  Pt agreeable to OT this date w/ fly in room. Perf sup-->L EOB w/ Max A; CGA at EOB 2/2 signif forward flex; standing via RW w/ EOB elevated x 4 attempts for 30< sec per to 1/2-3/4 standing. Total A for sidestepping L via RW; EOB-->sup w/ Max-total A; AAROM 1x5 reps ea B UEs in supine. Pt then stated,   "I'm done.". Edu/tx re: general safety techs & HEP. Pt/fly verbalized understanding.    Pt w/ limited progress to date & cont w/ decreased overall endurance/conditioning, balance/mobility & coordination w/ subsequent decline in (I)/safety w/ BADLs, fxnl mobility & fxnl t/f's. Cont w/ OT per POC to increase phys/fxnl status & maximize potential to achieve established goals for d/c-->SNF w/ DME recs deferred.      "

## 2018-03-22 NOTE — PROGRESS NOTES
The Sw left a message for the pt's brother Tyler Craig([phone])to return the call in reference to the pt's d/c plan. The Sw informed him the pt's being d/c'd today and the Sw and Nehemiah just wanted to meet with him to make sure the caregivers are in place in the home so a readmit can be prevented.

## 2018-03-22 NOTE — PLAN OF CARE
Pt is being discharged to [facility]. Called and gave report to Katrin. He is waiting for transport to facility.

## 2018-03-22 NOTE — PROGRESS NOTES
The Sw spoke to Xu at [facility] who states the pt's clear to arrive. The pt's going to room 11. The Sw gave the pt's nurse the contact info to call report along with the copied chart. The Sw arranged the transport with [facility] w/c [place] [phone] and Patti stated she will arrange a  for 7pm. The Sw called to inform the pt's niece Nisha of the above mentioned info and she's in agreement with the d/c plan for [facility] today. The pt choice form has been completed and placed in the pt's chart. The pt's going to room 11.

## 2018-03-22 NOTE — NURSING
Pt rested comfortable but many times when the nurse passed the room pt was awake.  Once he asked if we were going to the grocery and if so could we get some tooth paste.  Pt had no other requests.     Tele monitoring: NSR, HR 90 with out alarms.     Bed in lowest potion, wheels locked, call light with in reach, on turn 2 Q schedule, arm band and non skid sock on.

## 2018-03-22 NOTE — PT/OT/SLP PROGRESS
Physical Therapy      Patient Name:  Goyo Craig Jr.   MRN:  [id number]    Two attempts made to see pt today.  First attempt, CNA in room assisting with hygiene, and second, nsg in room giving meds and pt getting ready to eat.  Pt is supposed to be discharged this afternoon.  Rocio Brady, PTA

## 2018-03-22 NOTE — PT/OT/SLP PROGRESS
"Occupational Therapy   Treatment    Name: Goyo Craig Jr.  MRN: [id number]  Admitting Diagnosis:  Traumatic rhabdomyolysis       Recommendations:     Discharge Recommendations: nursing facility, skilled  Discharge Equipment Recommendations:  wheelchair, hospital bed  Barriers to discharge:  Decreased caregiver support    Subjective     Communicated with: nsg prior to session.  Pain/Comfort:  · Pain Rating 1: 0/10  · Pain Rating Post-Intervention 1: 0/10    Patients cultural, spiritual, Yazidi conflicts given the current situation:  (none reported)    Objective:     Patient found with: bed alarm, telemetry    General Precautions: Standard, fall   Orthopedic Precautions:N/A   Braces:       Occupational Performance:    Bed Mobility:    · Patient completed Supine to Sit with maximal assistance  · Patient completed Sit to Supine with maximal assistance and total assistance     Functional Mobility/Transfers:  · Patient completed Sit <> Stand Transfer with minimum assistance and moderate assistance  with  rolling walker   · Functional Mobility:     Activities of Daily Living:  ·     Patient left HOB elevated with all lines intact, call button in reach, bed alarm on and fly present    [facility] 6 Click:  [facility] Total Score: 11    Treatment & Education:    Education:  Pt agreeable to OT this date w/ fly in room. Perf sup-->L EOB w/ Max A; CGA at EOB 2/2 signif forward flex; standing via RW w/ EOB elevated x 4 attempts for 30< sec per to 1/2-3/4 standing. Total A for sidestepping L via RW; EOB-->sup w/ Max-total A; AAROM 1x5 reps ea B UEs in supine. Pt then stated,   "I'm done.". Edu/tx re: general safety techs & HEP. Pt/fly verbalized understanding.      Assessment:   Pt w/ limited progress to date & cont w/ decreased overall endurance/conditioning, balance/mobility & coordination w/ subsequent decline in (I)/safety w/ BADLs, fxnl mobility & fxnl t/f's. Cont w/ OT per POC to increase phys/fxnl status & maximize potential to " achieve established goals for d/c-->SNF w/ DME recs deferred.    Goyo Craig Jr. is a 72 y.o. male with a medical diagnosis of Traumatic rhabdomyolysis.  He presents with ..  Performance deficits affecting function are weakness, impaired endurance, impaired functional mobilty, gait instability, impaired self care skills, impaired balance, impaired cognition, decreased coordination, decreased upper extremity function, decreased lower extremity function, decreased safety awareness, decreased ROM.      Rehab Prognosis:  fair; patient would benefit from acute skilled OT services to address these deficits and reach maximum level of function.       Plan:     Patient to be seen 5 x/week to address the above listed problems via self-care/home management, therapeutic activities, therapeutic exercises  · Plan of Care Expires: 04/19/18  · Plan of Care Reviewed with: patient    This Plan of care has been discussed with the patient who was involved in its development and understands and is in agreement with the identified goals and treatment plan    GOALS:    Occupational Therapy Goals        Problem: Occupational Therapy Goal    Goal Priority Disciplines Outcome Interventions   Occupational Therapy Goal     OT, PT/OT Ongoing (interventions implemented as appropriate)    Description:  Goals to be met by: 3/29/2018     Patient will increase functional independence with ADLs by performing:    Grooming while seated with Contact Guard Assistance.  Sitting at edge of bed x20 minutes with Stand-by Assistance.  Rolling to Bilateral with Supervision.   Supine to sit with Contact Guard Assistance.  Toilet transfer to bedside commode  Assessment without distress.   Increased functional strength to 3/5 for B shlds.  Upper extremity exercise program 10 reps per handout, with assistance as needed.                       Time Tracking:     OT Date of Treatment: 03/22/18  OT Start Time: 1115  OT Stop Time: 1140  OT Total Time (min): 25  min    Billable Minutes:Therapeutic Activity 25    MIRIAN More  3/22/2018

## 2018-03-22 NOTE — PROGRESS NOTES
The Sw met with the pt's 2 nieces Amarilys and Nisha to discuss his d/c plan. They both state the pt doesn't want to go to a nh snf. The pt used to have a PCA paid for by Medicaid but the agency closed down and the pt never got switched to a new one. The Sw gave Amarilys the contact info for the states to resume the pt's PCA or to restart the process. She states she will falguni. The family prefers [facility]. The Sw learned Dr. Hayes is the pt's PCP and he's the medical director at [facility]. The Sw called [facility] and spoke to Xu who states they do have beds so the Sw faxed them the requested info via [employer]. She states if the pt's medically accepted and she receives all the info today they will be able to admit the pt today. The Sw will go and speak with the pt along with Nehemiah and his family to see if he would be willing to go to [facility] swing bed b/c it's not a nh snf and the medical director's his PCP.    12:18pm The Sw spoke to the pt's niece Amarilys who states the pt's agreeable to go to [facility] but not a nh snf. The Sw explained to her the pt must meet criteria but it's worth the try b/c the pt is resistant to a nh snf. The family understands if the pt's not accepted to [facility] he will go home with [place] [facility](PT/OT/Aide) and the family will call to get the pt on the waiting list to resume his PCA with Medicaid. The pt and his family are in agreement with the d/c plan for [facility] or home with hh. The pt continues to refuse nh snf even though snf was recommended by therapy.

## 2018-03-22 NOTE — PLAN OF CARE
[facility] HOME HEALTH ORDERS  FACE TO FACE ENCOUNTER    Patient Name: Goyo Craig Jr.  YOB: 1945    PCP: Chrissy aHyes MD   PCP Address: [address] [place] [address] / [facility]  PCP Phone Number: [phone]  PCP Fax: [phone]    Encounter Date: 03/22/2018    Admit to Home Health    Diagnoses:  Active Hospital Problems    Diagnosis  POA    Pneumonia of left lower lobe due to infectious organism [J18.1]  Yes    Frequent falls [R29.6]  Not Applicable    BPH (benign prostatic hyperplasia) [N40.0]  Yes     Chronic    Debility [R53.81]  Yes    Coronary artery disease involving native coronary artery of native heart without angina pectoris [I25.10]  Yes     Chronic    Generalized muscle weakness [M62.81]  Yes    Vasovagal syncope [R55]  Yes     Chronic    Right leg weakness [R29.898]  Yes     Chronic    Weakness of left upper extremity [R29.898]  Yes     Chronic     After left neck surgery      Benign essential hypertension [I10]  Yes     Chronic    Old lacunar stroke without late effect [Z86.73]  Not Applicable     Chronic    CAD S/P percutaneous coronary angioplasty [I25.10, Z98.61]  Not Applicable     Chronic      Resolved Hospital Problems    Diagnosis Date Resolved POA    *Traumatic rhabdomyolysis [T79.6XXA] 03/20/2018 Yes    Fever [R50.9] 03/22/2018 No       No future appointments.  Follow-up Information     Chrissy Hayes MD.    Specialty:  Internal Medicine  Contact information:  [address] [place], [place] [address] [address]  [phone]                     I have seen and examined this patient face to face today. My clinical findings that support the need for the home health skilled services and home bound status are the following:  Weakness/numbness causing balance and gait disturbance due to Weakness/Debility making it taxing to leave home.    Allergies:Review of patient's allergies indicates:  No Known  Allergies    Diet: cardiac diet    Activities: activity as tolerated and ambulate in house with assistance    Nursing:   SN to complete comprehensive assessment including routine vital signs. Instruct on disease process and s/s of complications to report to MD. Review/verify medication list sent home with the patient at time of discharge  and instruct patient/caregiver as needed. Frequency may be adjusted depending on start of care date.    Notify MD if SBP > 160 or < 90; DBP > 90 or < 50; HR > 120 or < 50; Temp > 101      CONSULTS:    Physical Therapy to evaluate and treat. Evaluate for home safety and equipment needs; Establish/upgrade home exercise program. Perform / instruct on therapeutic exercises, gait training, transfer training, and Range of Motion.  Occupational Therapy to evaluate and treat. Evaluate home environment for safety and equipment needs. Perform/Instruct on transfers, ADL training, ROM, and therapeutic exercises.  Aide to provide assistance with personal care, ADLs, and vital signs.    MISCELLANEOUS CARE:  N/A    WOUND CARE ORDERS  n/a      Medications: Review discharge medications with patient and family and provide education.      Current Discharge Medication List      START taking these medications    Details   amoxicillin-clavulanate 875-125mg (AUGMENTIN) 875-125 mg per tablet Take 1 tablet by mouth every 12 (twelve) hours.  Qty: 7 tablet, Refills: 0         CONTINUE these medications which have NOT CHANGED    Details   aspirin (ECOTRIN) 81 MG EC tablet Take 81 mg by mouth once daily.      baclofen (LIORESAL) 10 MG tablet Take 10 mg by mouth 3 (three) times daily as needed (muscle spasm).      cilostazol (PLETAL) 100 MG Tab Take 100 mg by mouth 2 (two) times daily.       lisinopril 10 MG tablet Take 10 mg by mouth once daily.      meloxicam (MOBIC) 7.5 MG tablet Take 7.5 mg by mouth 2 (two) times daily. And as needed for arthritis pain      tamsulosin (FLOMAX) 0.4 mg Cp24 Take 0.4 mg by  mouth once daily.         STOP taking these medications       atorvastatin (LIPITOR) 20 MG tablet Comments:   Reason for Stopping:         nicotine polacrilex 2 MG Lozg Comments:   Reason for Stopping:               I certify that this patient is confined to his home and needs intermittent skilled nursing care, physical therapy and occupational therapy.      Manjeet Rangel MD  [facility]  03/22/2018

## 2018-03-23 ENCOUNTER — PATIENT OUTREACH (OUTPATIENT)
Dept: ADMINISTRATIVE | Facility: CLINIC | Age: 73
End: 2018-03-23

## 2018-03-23 NOTE — PT/OT/SLP DISCHARGE
Physical Therapy Discharge Summary    Name: Goyo Craig Jr.  MRN: [id number]   Principal Problem: Traumatic rhabdomyolysis     Patient Discharged from acute Physical Therapy on 3/22/18.  Please refer to prior PT noted date on 3/21/18 for functional status.     Assessment:     Patient was discharged unexpectedly.  Information required to complete an accurate discharge summary is unknown.  Refer to therapy initial evaluation and last progress note for initial and most recent functional status and goal achievement.  Recommendations made may be found in medical record.    Objective:     GOALS:    Physical Therapy Goals     Not on file          Multidisciplinary Problems (Resolved)        Problem: Physical Therapy Goal    Goal Priority Disciplines Outcome Goal Variances Interventions   Physical Therapy Goal   (Resolved)     PT/OT, PT Outcome(s) achieved     Description:  Goals to be met by: 18     Patient will increase functional independence with mobility by performin. Supine <> sit with MInimal Assistance  2. Sit to stand transfer with Minimal Assistance  3. Bed to chair transfer with Minimal Assistance using Rolling Walker  4. Gait  x 25 feet with Minimal Assistance using Rolling Walker.   5. Sitting at edge of bed x 15 minutes with Stand-by Assistance                       Reasons for Discontinuation of Therapy Services  Transfer to alternate level of care.      Plan:     Patient Discharged to: [place] swing bed.    Roberth De La Rosa, PT  3/23/2018

## 2018-03-23 NOTE — NURSING
Pt discharged but ambulance has not arrived. They were rerouted 2 or 3 tiems during the evening. Spoke with Cynthia, advised that we would call her as soon as he is transferred to EMS.    Tele monitoring stopped.     Bed in lowest position, wheels locked, call light with in reach, non skid socks worn.  ID band worn.

## 2018-03-26 LAB
BACTERIA BLD CULT: NORMAL
BACTERIA BLD CULT: NORMAL

## 2018-03-29 NOTE — PHYSICIAN QUERY
"PT Name: Goyo Craig Jr.  MR #: [id number]    Physician Query Form -Present on Admission (POA) Diagnosis Clarification     CDS/: Juliana Del Valle               Contact information:[email or web address]    This form is a permanent document in the medical record.     Query Date: March 29, 2018    By submitting this query, we are merely seeking further clarification of documentation. Please utilize your independent clinical judgment when addressing the question(s) below.       The Medical record contains the following:    Diagnosis      Supporting Clinical Information   Location in Medical Record   Pneumonia of left lower lobe due to infectious organism                Traumatic rhabdomyolysis  Patient fell sometime in the past 2 days d/t legs getting "stiff" and slept on the floor all weakened    Positive for shortness of breath     At 1:05 am on 3/21/18, he began having fever.  Repeat chest x-ray showed a developing left lower lobe pneumonia.  He had no symptomatic complaints    Amoxicillin Po    Findings most consistent with developing left lower lobe pneumonic infiltrate.  There is diffuse abnormal interstitial pattern as well.         H&P            H&P      Discharge Summary              MAR    Xray 3/21         Doctor, Please specify Present On Admission (POA) status of Pneumonia of left lower lober.    [  ] Present on Admission   [  ] Not Present on Admission  [x  ] Clinically undetermined            "

## 2018-06-12 ENCOUNTER — HOSPITAL ENCOUNTER (EMERGENCY)
Facility: HOSPITAL | Age: 73
Discharge: OTHER INSTITUTION | End: 2018-06-12
Attending: FAMILY MEDICINE
Payer: MEDICARE

## 2018-06-12 VITALS
HEART RATE: 84 BPM | WEIGHT: 174 LBS | SYSTOLIC BLOOD PRESSURE: 158 MMHG | HEIGHT: 73 IN | TEMPERATURE: 98 F | RESPIRATION RATE: 18 BRPM | BODY MASS INDEX: 23.06 KG/M2 | OXYGEN SATURATION: 97 % | DIASTOLIC BLOOD PRESSURE: 102 MMHG

## 2018-06-12 DIAGNOSIS — R53.1 GENERALIZED WEAKNESS: Primary | ICD-10-CM

## 2018-06-12 DIAGNOSIS — R41.82 ALTERED MENTAL STATUS: ICD-10-CM

## 2018-06-12 LAB
ALBUMIN SERPL BCP-MCNC: 4.1 G/DL
ALP SERPL-CCNC: 100 U/L
ALT SERPL W/O P-5'-P-CCNC: 15 U/L
AMPHET+METHAMPHET UR QL: NEGATIVE
ANION GAP SERPL CALC-SCNC: 13 MMOL/L
APTT BLDCRRT: 27.3 SEC
AST SERPL-CCNC: 25 U/L
BACTERIA #/AREA URNS AUTO: NORMAL /HPF
BARBITURATES UR QL SCN>200 NG/ML: NEGATIVE
BASOPHILS # BLD AUTO: 0.02 K/UL
BASOPHILS NFR BLD: 0.3 %
BENZODIAZ UR QL SCN>200 NG/ML: NEGATIVE
BILIRUB SERPL-MCNC: 0.8 MG/DL
BILIRUB UR QL STRIP: NEGATIVE
BUN SERPL-MCNC: 7 MG/DL
BZE UR QL SCN: NEGATIVE
CALCIUM SERPL-MCNC: 9.4 MG/DL
CANNABINOIDS UR QL SCN: NEGATIVE
CHLORIDE SERPL-SCNC: 105 MMOL/L
CK SERPL-CCNC: 247 U/L
CLARITY UR REFRACT.AUTO: CLEAR
CO2 SERPL-SCNC: 25 MMOL/L
COLOR UR AUTO: YELLOW
CREAT SERPL-MCNC: 0.62 MG/DL
CREAT UR-MCNC: 93.4 MG/DL
DIFFERENTIAL METHOD: ABNORMAL
EOSINOPHIL # BLD AUTO: 0.1 K/UL
EOSINOPHIL NFR BLD: 1 %
ERYTHROCYTE [DISTWIDTH] IN BLOOD BY AUTOMATED COUNT: 15.2 %
EST. GFR  (AFRICAN AMERICAN): >60 ML/MIN/1.73 M^2
EST. GFR  (NON AFRICAN AMERICAN): >60 ML/MIN/1.73 M^2
GLUCOSE SERPL-MCNC: 101 MG/DL
GLUCOSE UR QL STRIP: NEGATIVE
HCT VFR BLD AUTO: 43.2 %
HGB BLD-MCNC: 14.3 G/DL
HGB UR QL STRIP: NEGATIVE
HYALINE CASTS UR QL AUTO: 0 /LPF
INR PPP: 1.2
KETONES UR QL STRIP: ABNORMAL
LACTATE SERPL-SCNC: 1.6 MMOL/L
LEUKOCYTE ESTERASE UR QL STRIP: NEGATIVE
LYMPHOCYTES # BLD AUTO: 1.7 K/UL
LYMPHOCYTES NFR BLD: 23.2 %
MAGNESIUM SERPL-MCNC: 1.7 MG/DL
MCH RBC QN AUTO: 27.8 PG
MCHC RBC AUTO-ENTMCNC: 33.1 G/DL
MCV RBC AUTO: 84 FL
METHADONE UR QL SCN>300 NG/ML: NEGATIVE
MICROSCOPIC COMMENT: NORMAL
MONOCYTES # BLD AUTO: 0.8 K/UL
MONOCYTES NFR BLD: 10.3 %
NEUTROPHILS # BLD AUTO: 4.8 K/UL
NEUTROPHILS NFR BLD: 64.9 %
NITRITE UR QL STRIP: NEGATIVE
OPIATES UR QL SCN: NEGATIVE
PCP UR QL SCN>25 NG/ML: NEGATIVE
PH UR STRIP: 7 [PH] (ref 5–8)
PLATELET # BLD AUTO: 230 K/UL
PMV BLD AUTO: 10.9 FL
POTASSIUM SERPL-SCNC: 3.6 MMOL/L
PROT SERPL-MCNC: 8.3 G/DL
PROT UR QL STRIP: ABNORMAL
PROTHROMBIN TIME: 12.7 SEC
RBC # BLD AUTO: 5.15 M/UL
RBC #/AREA URNS AUTO: 1 /HPF (ref 0–4)
SODIUM SERPL-SCNC: 143 MMOL/L
SP GR UR STRIP: 1.01 (ref 1–1.03)
TOXICOLOGY INFORMATION: NORMAL
TROPONIN I SERPL DL<=0.01 NG/ML-MCNC: <0.012 NG/ML
URN SPEC COLLECT METH UR: ABNORMAL
UROBILINOGEN UR STRIP-ACNC: 1 EU/DL
WBC # BLD AUTO: 7.3 K/UL
WBC #/AREA URNS AUTO: 3 /HPF (ref 0–5)

## 2018-06-12 PROCEDURE — 82550 ASSAY OF CK (CPK): CPT

## 2018-06-12 PROCEDURE — 80053 COMPREHEN METABOLIC PANEL: CPT

## 2018-06-12 PROCEDURE — 83605 ASSAY OF LACTIC ACID: CPT

## 2018-06-12 PROCEDURE — 85610 PROTHROMBIN TIME: CPT

## 2018-06-12 PROCEDURE — 85025 COMPLETE CBC W/AUTO DIFF WBC: CPT

## 2018-06-12 PROCEDURE — 81000 URINALYSIS NONAUTO W/SCOPE: CPT | Mod: 59

## 2018-06-12 PROCEDURE — 99285 EMERGENCY DEPT VISIT HI MDM: CPT | Mod: 25

## 2018-06-12 PROCEDURE — 85730 THROMBOPLASTIN TIME PARTIAL: CPT

## 2018-06-12 PROCEDURE — 83735 ASSAY OF MAGNESIUM: CPT

## 2018-06-12 PROCEDURE — 80307 DRUG TEST PRSMV CHEM ANLYZR: CPT

## 2018-06-12 PROCEDURE — 84484 ASSAY OF TROPONIN QUANT: CPT

## 2018-06-12 PROCEDURE — 93005 ELECTROCARDIOGRAM TRACING: CPT

## 2018-06-12 PROCEDURE — 93010 ELECTROCARDIOGRAM REPORT: CPT | Mod: ,,, | Performed by: INTERNAL MEDICINE

## 2018-06-12 RX ORDER — CLOTRIMAZOLE AND BETAMETHASONE DIPROPIONATE 10; .64 MG/G; MG/G
CREAM TOPICAL 2 TIMES DAILY
COMMUNITY

## 2018-06-12 RX ORDER — FINASTERIDE 5 MG/1
5 TABLET, FILM COATED ORAL DAILY
COMMUNITY

## 2018-06-12 RX ORDER — UBIDECARENONE 75 MG
500 CAPSULE ORAL DAILY
COMMUNITY

## 2018-06-12 NOTE — ED NOTES
"Pt is smiling with symmetrical smile, conversing with staff, but was pulling against nurse and being somewhat uncooperative with placement of iv and lab draw. Pt tongue is midline, will not cooperate with holding legs or arms in the air. States "not today". Tongue is pale. Skin w/d, resp nonlabored.   "

## 2018-06-12 NOTE — ED NOTES
"Pt unsure of his medicines "they give them to me, I don't do that." States to contact Nisha Chapman at [facility].   "
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
## 2018-06-12 NOTE — ED NOTES
Spoke to  at [facility], he states after 4:30 the residents take care of themselves so there is no one there to contact to get that information.  states he knows a family member and will call them for more information and return call here.

## 2018-06-13 NOTE — ED NOTES
Dr. Davis notified of pt's BP; no new orders given at this time; pt resting quietly in bed, cardiac monitoring continue, NSR noted; pt denies any CP, HA, blurry vision or SOB; will continue to monitor; awaiting EMS transport

## 2018-06-13 NOTE — ED NOTES
Pt accepted by Dr. Hayes to [facility]; [place] called and notified of acceptance; awaiting return call for room assignment

## 2018-06-13 NOTE — ED NOTES
Pt's family requesting admission to [facility].  [facility] called for consult.  Awaiting return call

## 2018-06-13 NOTE — ED NOTES
"Report received from MIRA Chang and care assumed; pt sitting up in bed, no distress noted; pt denies any pain or discomfort at this time, reports "I'm feeling good"; will monitor closely.  "

## 2018-06-13 NOTE — ED NOTES
"Road test completed with walker; pt expressing "my muscles are really tight"; pt ambulated from bed to door, extremely slow gait noted, requiring stand by assist.  Family expressing concerns regarding pt being discharged home and living by himself.  Dr. Davis notified   "

## 2018-06-21 ENCOUNTER — CLINICAL SUPPORT (OUTPATIENT)
Dept: SMOKING CESSATION | Facility: CLINIC | Age: 73
End: 2018-06-21
Payer: COMMERCIAL

## 2018-06-21 DIAGNOSIS — F17.200 NICOTINE DEPENDENCE: Primary | ICD-10-CM

## 2018-06-21 PROCEDURE — 99407 BEHAV CHNG SMOKING > 10 MIN: CPT | Mod: S$GLB,,, | Performed by: INTERNAL MEDICINE
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
## 2018-07-03 ENCOUNTER — HOSPITAL ENCOUNTER (OUTPATIENT)
Dept: RADIOLOGY | Facility: HOSPITAL | Age: 73
Discharge: HOME | End: 2018-07-03
Attending: INTERNAL MEDICINE
Payer: MEDICARE

## 2018-07-03 DIAGNOSIS — M54.50 LOW BACK PAIN: ICD-10-CM

## 2018-07-03 DIAGNOSIS — M54.50 LOW BACK PAIN: Primary | ICD-10-CM

## 2018-07-03 PROCEDURE — 72100 X-RAY EXAM L-S SPINE 2/3 VWS: CPT | Mod: TC,FY,PO

## 2018-07-03 PROCEDURE — 72220 X-RAY EXAM SACRUM TAILBONE: CPT | Mod: TC,FY,PO

## 2018-08-16 DIAGNOSIS — I71.40 ABDOMINAL AORTIC ANEURYSM WITHOUT RUPTURE: Primary | ICD-10-CM

## 2018-10-19 ENCOUNTER — OUTSIDE PLACE OF SERVICE (OUTPATIENT)
Dept: CARDIOLOGY | Facility: CLINIC | Age: 73
End: 2018-10-19
Payer: MEDICARE

## 2018-10-19 PROCEDURE — 93010 ELECTROCARDIOGRAM REPORT: CPT | Mod: S$GLB,,, | Performed by: INTERNAL MEDICINE

## 2018-11-02 ENCOUNTER — TELEPHONE (OUTPATIENT)
Dept: SMOKING CESSATION | Facility: CLINIC | Age: 73
End: 2018-11-02

## 2018-11-06 DIAGNOSIS — M54.9 BACK PAIN: Primary | ICD-10-CM

## 2018-11-13 ENCOUNTER — CLINICAL SUPPORT (OUTPATIENT)
Dept: REHABILITATION | Facility: HOSPITAL | Age: 73
End: 2018-11-13
Payer: MEDICARE

## 2018-11-13 DIAGNOSIS — R26.2 DIFFICULTY WALKING: ICD-10-CM

## 2018-11-13 DIAGNOSIS — M62.81 MUSCLE WEAKNESS OF LOWER EXTREMITY: ICD-10-CM

## 2018-11-13 DIAGNOSIS — G89.29 CHRONIC BACK PAIN, UNSPECIFIED BACK LOCATION, UNSPECIFIED BACK PAIN LATERALITY: ICD-10-CM

## 2018-11-13 DIAGNOSIS — M54.9 CHRONIC BACK PAIN, UNSPECIFIED BACK LOCATION, UNSPECIFIED BACK PAIN LATERALITY: ICD-10-CM

## 2018-11-13 PROCEDURE — 97162 PT EVAL MOD COMPLEX 30 MIN: CPT | Mod: PO

## 2018-11-13 PROCEDURE — G8979 MOBILITY GOAL STATUS: HCPCS | Mod: CL,PO

## 2018-11-13 PROCEDURE — 97116 GAIT TRAINING THERAPY: CPT | Mod: PO

## 2018-11-13 PROCEDURE — G8978 MOBILITY CURRENT STATUS: HCPCS | Mod: CM,PO

## 2018-11-13 PROCEDURE — G8980 MOBILITY D/C STATUS: HCPCS | Mod: CM,PO

## 2018-11-13 NOTE — PLAN OF CARE
[facility]  Physical Therapy Initial Evaluation    Name: Goyo Craig Jr.  Clinic Number: [id number]    Therapy Diagnosis:   Encounter Diagnoses   Name Primary?    Chronic back pain, unspecified back location, unspecified back pain laterality     Difficulty walking     Muscle weakness of lower extremity      Physician: Chrissy Hayes MD    Physician Orders: PT Eval and Treat , Gait training  Medical Diagnosis from Referral: Back Pain  Evaluation Date: 11/13/2018  Authorization Period Expiration: 12/31/18  Plan of Care Expiration: 12/31/18  Visit # / Visits authorized: 1/10    Time In: 0913  Time Out: 1000  Total Billable Time: 30 minutes    Precautions: Standard    Subjective   Pt  Arrived in a wheel chair. Eval     Date of onset:   History of current condition - Goyo reports: pt reports that he had an operation on his neck previously and that everything has gone downhill ever since. Pt has hx of stroke. The majority of his day he is in a wc. Pt may stand up once or twice if he goes to the restroom within a day/shower. No hx of pressure sores. Pt admits that he has fear of falling and that he does not stand much at any point in the day.       Imaging, bone scan films:     FINDINGS:  The bones are osteopenic.  No definite fracture is identified.  There may be fusion involving the superior left sacroiliac joint.  The right sacroiliac joint appears patent.  Atherosclerotic abdominal aorta measuring 3.4 cm in AP dimension.    Prior Therapy: here at [facility]  Social History:  lives with their sister  Occupation: disabled, prev was a .  Prior Level of Function: needs assistance  Current Level of Function: needs assistance    Pain:  Current 6/10, pt did not describe pain. Pt discussed his fear of falling and casually mentioned pain at a 6 (but also stated that pain was not his focus).    Pts goals:To not have a fear of falling.    Past Medical History:   Diagnosis Date     Acute anterior myocardial infarction 7/10/2007    s/p PTCA    CVA (cerebral infarction)     no deficits    Hypertension     Osteoarthritis     Stroke      Goyo Craig Jr.  has a past surgical history that includes Coronary stent placement (7/10/2007); Neck surgery (Left); and Abdominal surgery.    Goyo has a current medication list which includes the following prescription(s): aspirin, baclofen, cilostazol, clotrimazole-betamethasone 1-0.05%, cyanocobalamin, finasteride, lisinopril, meloxicam, and tamsulosin.    Review of patient's allergies indicates:  No Known Allergies       Objective     Postural examination in standing and sitting:   forward head  - forward shoulders  -Thoracic kyphosis, slouched in chair with left pelvic obliquity,        Functional assessment:   - walking/gait:Pt could take 3 steps at the parallel bars  - sit to stand: contact guard assist  Pt primarily in wc during the eval    Flexibility testing:  - hamstrings:  Tightness due to sedentary nature  -   Lumbar ROM: (measured in degrees)    Degrees Quality   Flexion 20   Poor, limited by patient will/volition   Extension 3    poor   Left Side Bending recheck    Right Side Bending recheck    Left rotation 10 deg    Right Rotation       Active/Passive Hip ROM: (measured in degrees)    RLE LLE   Flexion 95 AROM in wc/ 95 in wc AROM/   Abduction 15/ 15/     Dermatomes: (impaired/normal)     RLE LLE   L2 Intact Intact   L3 Intact Intact   L4 Intact Intact   L5 Intact Intact   S1 Intact Intact     Muscle Strength  MMT R L   Hip flexion 3/5 3/5   Hip abduction 3/5 3/5   Hip extension 3-/5 3-/5   Knee extension 3/5 3/5   Knee flexion 3/5 3/5   Ankle dorsiflexion 3-/5 3-/5   Ankle plantar flexion 3/5 3/5     Reflexes:   Special Tests: ((+): pos.; (-): neg.)   · Quadrant test:  Pt unable to perform today   · Slump Test: Pt unable to perform today  · SLR Test:Pt unable to perform today       Palpation for condition:       Joint Mobility:  Passive  can move through range at LE     Endurance is POOR    PT Evaluation Completed? Yes  Discussed Plan of Care with patient: Yes          CMS Impairment/Limitation/Restriction for FOTO Lumbar Spine Survey  Status Limitation G-Code CMS Severity Modifier  Intake 11% 89% Current Status CM - At least 80 percent but less than 100 percent  Predicted 31% 69% Goal Status+ CL - At least 60 percent but less than 80 percent  D/C Status CM **only report if this is a one time visit  +Based on FOTO predicted change score  * Mean, Risk Adjusted, Intake Composite FS measures from FOTO aggregate database.  ** As indicated by the ICF assignments to the survey items in the FOTO survey used.  [facility] [place]  INTAKE FUNCTIONAL STATUS SUMMARY (11/13/2018)  Patient: GOYO CRAIG ([id number]) Primary Body Part: Lumbar Spine Initial DOS: 11/13/2018  Produced and © [id number] by  [employer]  CMS Impairment/Limitation/Restriction for FOTO Lumbar Survey    Therapist reviewed FOTO scores for Goyo Craig Jr. on 11/13/2018.   FOTO documents entered into [employer] - see Media section.    Limitation Score: 89%  Category: Mobility    Current : CM = at least 80% but < 100% impaired limited or restricted  Goal: CL = least 60% but < 80% impaired, limited or restricted  Discharge: CM = at least 80% but < 100% impaired limited or restricted         TREATMENT   Treatment Time In: 0941  Treatment Time Out: 1000  Total Treatment time separate from Evaluation: 12 minutes      Goyo participated in gait training to improve functional mobility and safety for 12  minutes, including:  Standing at parallel bars, weight shifting on each foot, steps at bars 4 x 4 ft,      Home Exercises and Patient Education Provided    Education provided:   - For patient to stand up at the walker with help at home. Pt educated on building strength with just standing at the walker with help.     Written Home Exercises Provided: HEP  educated in terms of patient to simply stand up more than twice a day at the house with help.  Goyo demonstrated poor understanding of the education provided.     See EMR under Patient Instructions for exercises provided 11/13/2018.    Assessment   Goyo is a 73 y.o. male referred to outpatient Physical Therapy with a medical diagnosis of back pain. Pt presents with weakness in bilat LE, general debility from being primarily in a wc, and poor gait as pt does not ambulate much. Eval started late at 930 due to pt arrival and paperwork. Pt has difficulty with staying on task and being focused. Pt is primarily sedentary in his everyday life so motivation is not high. Pt can stand and reported that back pain is not the main problem. Pt can stand but presents with minimal movements of LE. Pt stood for about 4 min and reported that was the most he has stood all year.    Pt prognosis is Fair.   Pt will benefit from skilled outpatient Physical Therapy to address the deficits stated above and in the chart below, provide pt/family education, and to maximize pt's level of independence.     Plan of care discussed with patient: Yes  Pt's spiritual, cultural and educational needs considered and patient is agreeable to the plan of care and goals as stated below:     Anticipated Barriers for therapy: Patient motivation/complaince.    Medical Necessity is demonstrated by the following  History  Co-morbidities and personal factors that may impact the plan of care Co-morbidities:   advanced age and CAD    Personal Factors:   age     moderate   Examination  Body Structures and Functions, activity limitations and participation restrictions that may impact the plan of care Body Regions:   back  lower extremities    Body Systems:    ROM  strength  balance  gait    Participation Restrictions:   none    Activity limitations:   Learning and applying knowledge  listening    General Tasks and Commands  undertaking a single  task    Communication  no deficits    Mobility  walking  moving around using equipment (WC)    Self care  washing oneself (bathing, drying, washing hands)    Domestic Life  cooking  assisting others    Interactions/Relationships  no deficits    Life Areas  no deficits    Community and Social Life  no deficits         high   Clinical Presentation evolving clinical presentation with changing clinical characteristics moderate   Decision Making/ Complexity Score: moderate     Goals:  Short Term Goals (4 Weeks):   1. This patient will be independent with a basic HEP.   2. This patient will increase B LE strength by 1 grade in order to be able to perform tub transfer without UE usage.  4. This patient will have a pain rating of 7/10 at worst with ADLs.  5. Patient able to score greater than or equal to 20% intake on the FOTO Lumbar Spine Survey placing patient in 80%<% impaired, limited, or restricted category demonstrating overall decreased low back pain with functional activities.   6. Pt will ambulate 4 x 20 ft at the parallel bars with CGA x 1 or less assistance    Long Term Goals (7 Weeks):   1. This patient will be independent with an updated HEP of standing.   2. This patient will increase B LE strength to 4-/5 or better in order to be able to stand,amblulate, and transfer.  3. This patient will have a pain rating of 5/10 at worst with ADLs.  4. Patient able to score greater than or equal to 70% limitation on the FOTO Lumbar Spine Survey placing patient in 70% impaired, limited, or restricted category demonstrating overall decreased low back pain with functional activities.   5. Pt to begin standing with walker for 3 minutes, for 3-4 times a day with assistance as needed.    Plan   Plan of care Certification: 11/13/2018 to 12/31/18.  Pt will be treated by physical therapy 1-3 times a week for 7 weeks to include: Therapeutic exercises to increase ROM, strength and stabilization; joint and soft tissue mobilization  with manual therapy techniques to decrease muscle tightness, pain and improve joint mobility; neuromuscular re-education to improve balance, coordination, kinesthetic sense and proprioception, therapeutic activities to improve coordination, strength and function, therapeutic taping to decrease pain, provide support and improve function of the LE(s); modalities such as moist heat, ice, ultrasound and electrical stimulation to increase circulation, decrease pain and inflammation; dry needling with manual therapy techniques to decrease pain, inflammation and swelling, increase circulation and promote healing process will be considered and utilized as needed.  Pt may be seen by PTA to carry out plan of care as part of the Rehab team.    I certify the need for these services furnished under this plan of treatment and while under my care.    ____________________________________ Physician/Referring Practitioner                                  Date of Signature      Jimmie Jaime DPT

## 2018-11-14 ENCOUNTER — CLINICAL SUPPORT (OUTPATIENT)
Dept: SMOKING CESSATION | Facility: CLINIC | Age: 73
End: 2018-11-14
Payer: COMMERCIAL

## 2018-11-14 DIAGNOSIS — F17.200 NICOTINE DEPENDENCE: Primary | ICD-10-CM

## 2018-11-14 PROCEDURE — 99407 BEHAV CHNG SMOKING > 10 MIN: CPT | Mod: S$GLB,,,

## 2018-11-15 ENCOUNTER — CLINICAL SUPPORT (OUTPATIENT)
Dept: REHABILITATION | Facility: HOSPITAL | Age: 73
End: 2018-11-15
Payer: MEDICARE

## 2018-11-15 DIAGNOSIS — M62.81 MUSCLE WEAKNESS OF LOWER EXTREMITY: ICD-10-CM

## 2018-11-15 DIAGNOSIS — G89.29 CHRONIC BACK PAIN, UNSPECIFIED BACK LOCATION, UNSPECIFIED BACK PAIN LATERALITY: ICD-10-CM

## 2018-11-15 DIAGNOSIS — M54.9 CHRONIC BACK PAIN, UNSPECIFIED BACK LOCATION, UNSPECIFIED BACK PAIN LATERALITY: ICD-10-CM

## 2018-11-15 DIAGNOSIS — R26.2 DIFFICULTY WALKING: ICD-10-CM

## 2018-11-15 PROCEDURE — 97116 GAIT TRAINING THERAPY: CPT | Mod: PO

## 2018-11-15 NOTE — PROGRESS NOTES
Physical Therapy Daily Treatment Note     Name: Goyo Craig Jr.  Clinic Number: [id number]    Therapy Diagnosis:   Encounter Diagnoses   Name Primary?    Chronic back pain, unspecified back location, unspecified back pain laterality     Difficulty walking     Muscle weakness of lower extremity      Physician: Chrissy Hayes MD    Visit Date: 11/15/2018     Physician Orders: PT Eval and Treat , Gait training  Medical Diagnosis from Referral: Back Pain  Evaluation Date: 11/13/2018  Authorization Period Expiration: 12/31/18  Plan of Care Expiration: 12/31/18  Visit # / Visits authorized: 2/10    Time In: 8:08  Time Out: 8:50  Total Billable Time: 42 minutes    Precautions: Standard and Fall    Subjective     Pt reports: his back and legs hurt and feels stiff and weak all over.  Patient states he doesn't do anything all day but sit in his wheelchair.   He was not compliant with home exercise program.  Response to previous treatment: tired and weak.    Pain: 6/10  Location: bilateral back and lower legs     Objective     Patient arrived to therapy in a wheelchair.   Goyo participated in gait training to improve functional mobility and safety for 42 minutes 1:1 with PTA including:    Date  11/15/18 11/13/18   VISIT 2/10 1/10   G CODE VISIT 2/10 1/10   POC EXP. DATE 12/31/18 12/31/18   VISIT AMOUNT  MEDICARE TOTAL 89.94  2204.80 112.86  2114.86   FACE-TO-FACE 12/13/18         Standing in // bars 3 x 1 min    Weight shift each leg 2 x 10    Marching  3 x 10    Walking in // bars 3 x 4 ft 4 x 4 ft             Initials GWA 1/6 WV         Home Exercises Provided and Patient Education Provided     Education provided:   - importance of at least standing 2 or 3 times a day, only when someone is around for safety.     Written Home Exercises Provided: Patient instructed to cont prior HEP.  Exercises were reviewed and Goyo was able to demonstrate them prior to the end of the session.  Goyo demonstrated poor  understanding of the education provided.     See EMR under Patient Instructions for exercises provided prior visit.    Assessment     Patient required min assist with standing from wheelchair.  Patient required cues to reach back for his wheelchair before sitting down.  Patient required short rest after each exercise.  Goyo is progressing well towards his goals.   Pt prognosis is Fair.     Pt will continue to benefit from skilled outpatient physical therapy to address the deficits listed in the problem list box on initial evaluation, provide pt/family education and to maximize pt's level of independence in the home and community environment.     Pt's spiritual, cultural and educational needs considered and pt agreeable to plan of care and goals.    Anticipated barriers to physical therapy: Patient motivation/complaince.    Goals:  Short Term Goals (4 Weeks):   1. This patient will be independent with a basic HEP.   2. This patient will increase B LE strength by 1 grade in order to be able to perform tub transfer without UE usage.  4. This patient will have a pain rating of 7/10 at worst with ADLs.  5. Patient able to score greater than or equal to 20% intake on the FOTO Lumbar Spine Survey placing patient in 80%<% impaired, limited, or restricted category demonstrating overall decreased low back pain with functional activities.   6. Pt will ambulate 4 x 20 ft at the parallel bars with CGA x 1 or less assistance    Long Term Goals (7 Weeks):   1. This patient will be independent with an updated HEP of standing.   2. This patient will increase B LE strength to 4-/5 or better in order to be able to stand,amblulate, and transfer.  3. This patient will have a pain rating of 5/10 at worst with ADLs.  4. Patient able to score greater than or equal to 70% limitation on the FOTO Lumbar Spine Survey placing patient in 70% impaired, limited, or restricted category demonstrating overall decreased low back pain with functional  activities.   5. Pt to begin standing with walker for 3 minutes, for 3-4 times a day with assistance as needed.    Plan     Continue with Plan Of Care and progress toward PT goals.    Rory Boo, PTA

## 2018-11-20 ENCOUNTER — CLINICAL SUPPORT (OUTPATIENT)
Dept: REHABILITATION | Facility: HOSPITAL | Age: 73
End: 2018-11-20
Payer: MEDICARE

## 2018-11-20 DIAGNOSIS — M54.9 CHRONIC BACK PAIN, UNSPECIFIED BACK LOCATION, UNSPECIFIED BACK PAIN LATERALITY: ICD-10-CM

## 2018-11-20 DIAGNOSIS — M62.81 MUSCLE WEAKNESS OF LOWER EXTREMITY: ICD-10-CM

## 2018-11-20 DIAGNOSIS — R26.2 DIFFICULTY WALKING: ICD-10-CM

## 2018-11-20 DIAGNOSIS — G89.29 CHRONIC BACK PAIN, UNSPECIFIED BACK LOCATION, UNSPECIFIED BACK PAIN LATERALITY: ICD-10-CM

## 2018-11-20 PROCEDURE — 97116 GAIT TRAINING THERAPY: CPT | Mod: PO

## 2018-11-20 PROCEDURE — 97110 THERAPEUTIC EXERCISES: CPT | Mod: PO

## 2018-11-20 NOTE — PROGRESS NOTES
Physical Therapy Daily Treatment Note     Name: Goyo Craig Jr.  Clinic Number: [id number]    Therapy Diagnosis:   Encounter Diagnoses   Name Primary?    Chronic back pain, unspecified back location, unspecified back pain laterality     Difficulty walking     Muscle weakness of lower extremity      Physician: Chrissy Hayes MD    Visit Date: 11/20/2018     Physician Orders: PT Eval and Treat , Gait training  Medical Diagnosis from Referral: Back Pain  Evaluation Date: 11/13/2018  Authorization Period Expiration: 12/31/18  Plan of Care Expiration: 12/31/18  Visit # / Visits authorized: 2/10    Time In: 0902  Time Out: 0942  Total Billable Time: 40 minutes    Precautions: Standard and Fall    Subjective     Pt reports: pt reports that he could not get any movement this morning and felt stiff. Pt reports that he does not move much once he is in the wc.    He was not compliant with home exercise program.  Response to previous treatment: tired and weak.    Pain: 6/10  Location: bilateral back and lower legs     Objective     Patient arrived to therapy in a wheelchair.   Goyo participated in gait training/ to improve functional mobility and safety for 30 minutes 1:1 with PT including:    Date  11/20/18 11/15/18 11/13/18   VISIT 3/10 2/10 1/10   G CODE VISIT 3/10 2/10 1/10   POC EXP. DATE 12/31/18 12/31/18 12/31/18   VISIT AMOUNT  MEDICARE TOTAL 90.28   89.94  2204.80 112.86  2114.86   FACE-TO-FACE  12/13/18          Standing in // bars 3 x 1 min 3 x 1 min    Weight shift each leg 3 x 10 2 x 10    Marching  3 x 10 3 x 10    Walking in // bars 3 x 4 ft 3 x 4 ft 4 x 4 ft                           Initials W[place] GWA 1/6 WV     PT participated in Therapeutic exercise for LE strengthening x 10 minutes :  LAQ 1.5#, 2 x10, seated marches 2 x 10, Standing hip abd 2 x 8    Home Exercises Provided and Patient Education Provided     Education provided:   - importance of at least standing 2 or 3 times a day, only when someone is  around for safety.     Written Home Exercises Provided: Patient instructed to cont prior HEP.  Exercises were reviewed and Goyo was able to demonstrate them prior to the end of the session.  Goyo demonstrated poor understanding of the education provided.     See EMR under Patient Instructions for exercises provided prior visit.    Assessment     Patient required min assist with standing from wheelchair. Pt still needs several rest breaks for the session. Pt can tolerate standing for moments up to 4-8 min. Pt compliance is difficult with home exercises/progression at this time.   Goyo is progressing well towards his goals.   Pt prognosis is Fair.     Pt will continue to benefit from skilled outpatient physical therapy to address the deficits listed in the problem list box on initial evaluation, provide pt/family education and to maximize pt's level of independence in the home and community environment.     Pt's spiritual, cultural and educational needs considered and pt agreeable to plan of care and goals.    Anticipated barriers to physical therapy: Patient motivation/complaince.    Goals:  Short Term Goals (4 Weeks):   1. This patient will be independent with a basic HEP.   2. This patient will increase B LE strength by 1 grade in order to be able to perform tub transfer without UE usage.  4. This patient will have a pain rating of 7/10 at worst with ADLs.  5. Patient able to score greater than or equal to 20% intake on the FOTO Lumbar Spine Survey placing patient in 80%<% impaired, limited, or restricted category demonstrating overall decreased low back pain with functional activities.   6. Pt will ambulate 4 x 20 ft at the parallel bars with CGA x 1 or less assistance    Long Term Goals (7 Weeks):   1. This patient will be independent with an updated HEP of standing.   2. This patient will increase B LE strength to 4-/5 or better in order to be able to stand,amblulate, and transfer.  3. This patient will  have a pain rating of 5/10 at worst with ADLs.  4. Patient able to score greater than or equal to 70% limitation on the FOTO Lumbar Spine Survey placing patient in 70% impaired, limited, or restricted category demonstrating overall decreased low back pain with functional activities.   5. Pt to begin standing with walker for 3 minutes, for 3-4 times a day with assistance as needed.    Plan     Continue with Plan Of Care and progress toward PT goals.    Jimmie Sagastume, PT

## 2018-11-27 ENCOUNTER — CLINICAL SUPPORT (OUTPATIENT)
Dept: REHABILITATION | Facility: HOSPITAL | Age: 73
End: 2018-11-27
Payer: MEDICARE

## 2018-11-27 DIAGNOSIS — M54.9 CHRONIC BACK PAIN, UNSPECIFIED BACK LOCATION, UNSPECIFIED BACK PAIN LATERALITY: ICD-10-CM

## 2018-11-27 DIAGNOSIS — G89.29 CHRONIC BACK PAIN, UNSPECIFIED BACK LOCATION, UNSPECIFIED BACK PAIN LATERALITY: ICD-10-CM

## 2018-11-27 DIAGNOSIS — M62.81 MUSCLE WEAKNESS OF LOWER EXTREMITY: ICD-10-CM

## 2018-11-27 DIAGNOSIS — R26.2 DIFFICULTY WALKING: ICD-10-CM

## 2018-11-27 PROCEDURE — 97116 GAIT TRAINING THERAPY: CPT | Mod: PO

## 2018-11-27 PROCEDURE — 97110 THERAPEUTIC EXERCISES: CPT | Mod: PO

## 2018-11-27 NOTE — PROGRESS NOTES
Physical Therapy Daily Treatment Note     Name: Goyo Craig Jr.  Clinic Number: [id number]    Therapy Diagnosis:   Encounter Diagnoses   Name Primary?    Chronic back pain, unspecified back location, unspecified back pain laterality     Difficulty walking     Muscle weakness of lower extremity      Physician: Chrissy Hayes MD    Visit Date: 11/27/2018     Physician Orders: PT Eval and Treat , Gait training  Medical Diagnosis from Referral: Back Pain  Evaluation Date: 11/13/2018  Authorization Period Expiration: 12/31/18  Plan of Care Expiration: 12/31/18  Visit # / Visits authorized: 2/10    Time In: 9:00  Time Out: 9:55  Total Billable Time: 55 minutes    Precautions: Standard and Fall    Subjective     Pt reports: he does not stand at home nor do any of the exercises.  Patient states he just sits all day and he is happy with that.    He was not compliant with home exercise program.  Response to previous treatment: tired and weak.    Pain: 2/10  Location: bilateral back and lower legs     Objective     Patient arrived to therapy in a wheelchair.   Goyo participated in gait training/ to improve functional mobility and safety for 55 minutes 1:1 with PTA including:    Date  11/27/18 11/20/18 11/15/18 11/13/18   VISIT 4/10 3/10 2/10 1/10   G CODE VISIT 4/10 3/10 2/10 1/10   POC EXP. DATE 12/31/18 12/31/18 12/31/18 12/31/18   VISIT AMOUNT  MEDICARE TOTAL 120.26  2415.34 90.28  2295.08 89.94  2204.80 112.86  2114.86   FACE-TO-FACE 12/13/18 12/13/18           Standing in // bars 3 x 1 min 3 x 1 min 3 x 1 min    Weight shift each leg 3 x 10 3 x 10 2 x 10    Marching  3 x 10 3 x 10 3 x 10    Walking in // bars Not today 3 x 4 ft 3 x 4 ft 4 x 4 ft          Initials GWA 1/6 W[place] GWA 1/6 W[place]     PT participated in Therapeutic exercise for LE strengthening x 10 minutes :  LAQ 1.5#, 2 x10, seated marches 2 x 10, Standing hip abd 2 x 8    Home Exercises Provided and Patient Education Provided     Education provided:   -  importance of at least standing 2 or 3 times a day, only when someone is around for safety.     Written Home Exercises Provided: Patient instructed to cont prior HEP.  Exercises were reviewed and Goyo was able to demonstrate them prior to the end of the session.  Goyo demonstrated poor understanding of the education provided.     See EMR under Patient Instructions for exercises provided prior visit.    Assessment     Patient required min assist with standing from wheelchair. Pt still needs several rest breaks for the session.  Did not perform walking in parallel bars today due to patient complaining of his legs were not feeling strong enough today and that he didn't want to.  Pt compliance with home exercises/progression at this time is non-existent .     Goyo is progressing well towards his goals.   Pt prognosis is Fair.     Pt will continue to benefit from skilled outpatient physical therapy to address the deficits listed in the problem list box on initial evaluation, provide pt/family education and to maximize pt's level of independence in the home and community environment.     Pt's spiritual, cultural and educational needs considered and pt agreeable to plan of care and goals.    Anticipated barriers to physical therapy: Patient motivation/complaince.    Goals:  Short Term Goals (4 Weeks):   1. This patient will be independent with a basic HEP.   2. This patient will increase B LE strength by 1 grade in order to be able to perform tub transfer without UE usage.  4. This patient will have a pain rating of 7/10 at worst with ADLs.  5. Patient able to score greater than or equal to 20% intake on the FOTO Lumbar Spine Survey placing patient in 80%<% impaired, limited, or restricted category demonstrating overall decreased low back pain with functional activities.   6. Pt will ambulate 4 x 20 ft at the parallel bars with CGA x 1 or less assistance    Long Term Goals (7 Weeks):   1. This patient will be  independent with an updated HEP of standing.   2. This patient will increase B LE strength to 4-/5 or better in order to be able to stand,amblulate, and transfer.  3. This patient will have a pain rating of 5/10 at worst with ADLs.  4. Patient able to score greater than or equal to 70% limitation on the FOTO Lumbar Spine Survey placing patient in 70% impaired, limited, or restricted category demonstrating overall decreased low back pain with functional activities.   5. Pt to begin standing with walker for 3 minutes, for 3-4 times a day with assistance as needed.    Plan     Continue with Plan Of Care and progress toward PT goals.    Rory Boo, PTA

## 2018-12-04 ENCOUNTER — CLINICAL SUPPORT (OUTPATIENT)
Dept: REHABILITATION | Facility: HOSPITAL | Age: 73
End: 2018-12-04
Payer: MEDICARE

## 2018-12-04 DIAGNOSIS — M62.81 MUSCLE WEAKNESS OF LOWER EXTREMITY: ICD-10-CM

## 2018-12-04 DIAGNOSIS — G89.29 CHRONIC BACK PAIN, UNSPECIFIED BACK LOCATION, UNSPECIFIED BACK PAIN LATERALITY: ICD-10-CM

## 2018-12-04 DIAGNOSIS — M54.9 CHRONIC BACK PAIN, UNSPECIFIED BACK LOCATION, UNSPECIFIED BACK PAIN LATERALITY: ICD-10-CM

## 2018-12-04 DIAGNOSIS — R26.2 DIFFICULTY WALKING: ICD-10-CM

## 2018-12-04 PROCEDURE — 97110 THERAPEUTIC EXERCISES: CPT | Mod: PO

## 2018-12-04 PROCEDURE — 97116 GAIT TRAINING THERAPY: CPT | Mod: PO

## 2018-12-04 NOTE — PROGRESS NOTES
Physical Therapy Daily Treatment Note     Name: Goyo Craig Jr.  Clinic Number: [id number]    Therapy Diagnosis:   Encounter Diagnoses   Name Primary?    Chronic back pain, unspecified back location, unspecified back pain laterality     Difficulty walking     Muscle weakness of lower extremity      Physician: Chrissy Hayes MD    Visit Date: 12/4/2018     Physician Orders: PT Eval and Treat , Gait training  Medical Diagnosis from Referral: Back Pain  Evaluation Date: 11/13/2018  Authorization Period Expiration: 12/31/18  Plan of Care Expiration: 12/31/18  Visit # / Visits authorized: 5/10    Time In: 9:00  Time Out: 950  Total Billable Time: 40  minutes    Precautions: Standard and Fall    Subjective     Pt reports: that he is doing okay. At times that he is afraid of falling at times.    He was not compliant with home exercise program.  Response to previous treatment: tired and weak.    Pain: 2/10  Location: bilateral back and lower legs     Objective     Patient arrived to therapy in a wheelchair.   Goyo participated in gait training/ to improve functional mobility and safety for 28 minutes 1:1 with PT including:    Date  12/4/18 11/27/18 11/20/18 11/15/18 11/13/18   VISIT 5/10 4/10 3/10 2/10 1/10   G CODE VISIT 5/10 4/10 3/10 2/10 1/10   POC EXP. DATE 12/31/18 12/31/18 12/31/18 12/31/18 12/31/18   VISIT AMOUNT  MEDICARE TOTAL 90.28  2505.62 120.26  2415.34 90.28  2295.08 89.94  2204.80 112.86  2114.86   FACE-TO-FACE 12/13/18 12/13/18 12/13/18            Standing in // bars  3 x 1 min 3 x 1 min 3 x 1 min 3 x 1 min    Weight shift each leg 4 x8 3 x 10 3 x 10 2 x 10    Marching  3 x 10 3 x 10 3 x 10 3 x 10    Walking in // bars 3 x 5 ft Not today 3 x 4 ft 3 x 4 ft 4 x 4 ft           Initials [place] GWA 1/6 [place] GWA 1/6 [place]     PT participated in Therapeutic exercise for LE strengthening x 12 minutes :  LAQ 1.5#, 3 x10, seated marches 2 x 10, Standing hip abd 2 x 8    Home Exercises Provided and Patient Education  Provided     Education provided:   - importance of at least standing 2 or 3 times a day, only when someone is around for safety.     Written Home Exercises Provided: Patient instructed to cont prior HEP.  Exercises were reviewed and Goyo was able to demonstrate them prior to the end of the session.  Goyo demonstrated poor understanding of the education provided.     See EMR under Patient Instructions for exercises provided prior visit.    Assessment     Patient required min assist with standing from wheelchair. Pt still needs several rest breaks for the session. Pt motivation is low for compliance with all tasks, but today pt did perform all exercises. Pt has poor leg extension due to weakness on the LE.                                        Goal Status+ CL - At least 60 percent but less than 80 percent  12/4/2018 Limitation 64% Current Status CL - At least 60 percent but less than 80 percent                                    D/C Status CL **only report if this is discharge survey   Goyo is progressing well towards his goals.   Pt prognosis is Fair.     Pt will continue to benefit from skilled outpatient physical therapy to address the deficits listed in the problem list box on initial evaluation, provide pt/family education and to maximize pt's level of independence in the home and community environment.     Pt's spiritual, cultural and educational needs considered and pt agreeable to plan of care and goals.    Anticipated barriers to physical therapy: Patient motivation/complaince.    Goals:  Short Term Goals (4 Weeks):   1. This patient will be independent with a basic HEP.   2. This patient will increase B LE strength by 1 grade in order to be able to perform tub transfer without UE usage.  4. This patient will have a pain rating of 7/10 at worst with ADLs.  5. Patient able to score greater than or equal to 20% intake on the FOTO Lumbar Spine Survey placing patient in 80%<% impaired, limited, or  restricted category demonstrating overall decreased low back pain with functional activities.   6. Pt will ambulate 4 x 20 ft at the parallel bars with CGA x 1 or less assistance    Long Term Goals (7 Weeks):   1. This patient will be independent with an updated HEP of standing.   2. This patient will increase B LE strength to 4-/5 or better in order to be able to stand,amblulate, and transfer.  3. This patient will have a pain rating of 5/10 at worst with ADLs.  4. Patient able to score greater than or equal to 70% limitation on the FOTO Lumbar Spine Survey placing patient in 70% impaired, limited, or restricted category demonstrating overall decreased low back pain with functional activities.   5. Pt to begin standing with walker for 3 minutes, for 3-4 times a day with assistance as needed.    Plan     Continue with Plan Of Care and progress toward PT goals.    Jimmie Sagastume, PT

## 2018-12-13 ENCOUNTER — CLINICAL SUPPORT (OUTPATIENT)
Dept: REHABILITATION | Facility: HOSPITAL | Age: 73
End: 2018-12-13
Payer: MEDICARE

## 2018-12-13 DIAGNOSIS — R26.2 DIFFICULTY WALKING: ICD-10-CM

## 2018-12-13 DIAGNOSIS — M62.81 MUSCLE WEAKNESS OF LOWER EXTREMITY: ICD-10-CM

## 2018-12-13 DIAGNOSIS — G89.29 CHRONIC BACK PAIN, UNSPECIFIED BACK LOCATION, UNSPECIFIED BACK PAIN LATERALITY: ICD-10-CM

## 2018-12-13 DIAGNOSIS — M54.9 CHRONIC BACK PAIN, UNSPECIFIED BACK LOCATION, UNSPECIFIED BACK PAIN LATERALITY: ICD-10-CM

## 2018-12-13 PROCEDURE — 97116 GAIT TRAINING THERAPY: CPT | Mod: PO

## 2018-12-13 PROCEDURE — 97110 THERAPEUTIC EXERCISES: CPT | Mod: PO

## 2018-12-13 NOTE — PROGRESS NOTES
Physical Therapy Daily Treatment Note     Name: Goyo Craig Jr.  Clinic Number: [id number]    Therapy Diagnosis:   Encounter Diagnoses   Name Primary?    Chronic back pain, unspecified back location, unspecified back pain laterality     Difficulty walking     Muscle weakness of lower extremity      Physician: Chrissy Hayes MD    Visit Date: 12/13/2018     Physician Orders: PT Eval and Treat , Gait training  Medical Diagnosis from Referral: Back Pain  Evaluation Date: 11/13/2018  Authorization Period Expiration: 12/31/18  Plan of Care Expiration: 12/31/18  Visit # / Visits authorized: /10    Time In: 9:15  Time Out: 947  Total Billable Time: 32  minutes    Precautions: Standard and Fall    Subjective     Pt reports: that he is doing okay. At times that he is afraid of falling and that he does not get up from the chair often.    He was not compliant with home exercise program.  Response to previous treatment: tired and weak.    Pain: 2/10  Location: bilateral back and lower legs     Objective     Patient arrived to therapy in a wheelchair.   Goyo participated in gait training/ to improve functional mobility and safety for 20 minutes 1:1 with PT including:    Date  12/13/18 12/4/18 11/27/18 11/20/18 11/15/18 11/13/18   VISIT 6/10 5/10 4/10 3/10 2/10 1/10   G CODE VISIT 6/10 5/10 4/10 3/10 2/10 1/10   POC EXP. DATE 12/31/18 12/31/18 12/31/18 12/31/18 12/31/18 12/31/18   VISIT AMOUNT  MEDICARE TOTAL  90.28  2505.62 120.26  2415.34 90.28  2295.08 89.94  2204.80 112.86  2114.86   FACE-TO-FACE Today 12/13/18 12/13/18 12/13/18             Standing in // bars 4 x 1min  3 x 1 min 3 x 1 min 3 x 1 min 3 x 1 min    Weight shift each leg --- 4 x8 3 x 10 3 x 10 2 x 10    Marching  3 x 10,  3 x 10 3 x 10 3 x 10 3 x 10    Walking in // bars 4 x 20 ft 3 x 5 ft Not today 3 x 4 ft 3 x 4 ft 4 x 4 ft            Initials [place] GWA 1/6 [place] GWA 1/6 [place]     PT participated in Therapeutic exercise for LE strengthening x 12 minutes  :  LAQ 3#, 3 x10, seated marches 3 x 10 3#, Standing hip abd 2 x 8    Home Exercises Provided and Patient Education Provided     Education provided:   - importance of at least standing 2 or 3 times a day, only when someone is around for safety.  -encouragement for patient to try and walk more and stand up    Written Home Exercises Provided: Patient instructed to cont prior HEP.  Exercises were reviewed and Goyo was able to demonstrate them prior to the end of the session.  Goyo demonstrated poor understanding of the education provided.     See EMR under Patient Instructions for exercises provided prior visit.    Assessment     Patient did more ambulation today with therapist. Pt still has low motivation and weakness with LE, but did more overall in the session today.                             Goyo is progressing well towards his goals.   Pt prognosis is Fair.     Pt will continue to benefit from skilled outpatient physical therapy to address the deficits listed in the problem list box on initial evaluation, provide pt/family education and to maximize pt's level of independence in the home and community environment.     Pt's spiritual, cultural and educational needs considered and pt agreeable to plan of care and goals.    Anticipated barriers to physical therapy: Patient motivation/complaince.    Goals:  Short Term Goals (4 Weeks):   1. This patient will be independent with a basic HEP.   2. This patient will increase B LE strength by 1 grade in order to be able to perform tub transfer without UE usage.  4. This patient will have a pain rating of 7/10 at worst with ADLs.  5. Patient able to score greater than or equal to 20% intake on the FOTO Lumbar Spine Survey placing patient in 80%<% impaired, limited, or restricted category demonstrating overall decreased low back pain with functional activities.   6. Pt will ambulate 4 x 20 ft at the parallel bars with CGA x 1 or less assistance    Long Term Goals (7  Weeks):   1. This patient will be independent with an updated HEP of standing.   2. This patient will increase B LE strength to 4-/5 or better in order to be able to stand,amblulate, and transfer.  3. This patient will have a pain rating of 5/10 at worst with ADLs.  4. Patient able to score greater than or equal to 70% limitation on the FOTO Lumbar Spine Survey placing patient in 70% impaired, limited, or restricted category demonstrating overall decreased low back pain with functional activities.   5. Pt to begin standing with walker for 3 minutes, for 3-4 times a day with assistance as needed.    Plan     Continue with Plan Of Care and progress toward PT goals.    Jimmie Sagastume, PT

## 2018-12-20 ENCOUNTER — CLINICAL SUPPORT (OUTPATIENT)
Dept: REHABILITATION | Facility: HOSPITAL | Age: 73
End: 2018-12-20
Payer: MEDICARE

## 2018-12-20 DIAGNOSIS — R26.2 DIFFICULTY WALKING: ICD-10-CM

## 2018-12-20 DIAGNOSIS — M54.9 CHRONIC BACK PAIN, UNSPECIFIED BACK LOCATION, UNSPECIFIED BACK PAIN LATERALITY: ICD-10-CM

## 2018-12-20 DIAGNOSIS — M62.81 MUSCLE WEAKNESS OF LOWER EXTREMITY: ICD-10-CM

## 2018-12-20 DIAGNOSIS — G89.29 CHRONIC BACK PAIN, UNSPECIFIED BACK LOCATION, UNSPECIFIED BACK PAIN LATERALITY: ICD-10-CM

## 2018-12-20 PROCEDURE — 97116 GAIT TRAINING THERAPY: CPT | Mod: PO

## 2018-12-20 PROCEDURE — 97110 THERAPEUTIC EXERCISES: CPT | Mod: PO

## 2018-12-20 NOTE — PROGRESS NOTES
Physical Therapy Daily Treatment Note     Name: Goyo Craig Jr.  Clinic Number: [id number]    Therapy Diagnosis:   Encounter Diagnoses   Name Primary?    Chronic back pain, unspecified back location, unspecified back pain laterality     Difficulty walking     Muscle weakness of lower extremity      Physician: Chrissy Hayes MD    Visit Date: 12/20/2018     Physician Orders: PT Eval and Treat , Gait training  Medical Diagnosis from Referral: Back Pain  Evaluation Date: 11/13/2018  Authorization Period Expiration: 12/31/18  Plan of Care Expiration: 12/31/18  Visit # / Visits authorized: /10    Time In: 9:05  Time Out: 945  Total Billable Time: 45  minutes    Precautions: Standard and Fall    Subjective     Pt reports: that he is fearful of falling but that he thought about really trying to move/stand from here on out. Pt reports that he is going to try and stand more.    He was not compliant with home exercise program.  Response to previous treatment: tired and weak.    Pain: 2/10  Location: bilateral back and lower legs     Objective     Patient arrived to therapy in a wheelchair.   Goyo participated in gait training/ to improve functional mobility and safety for 25 minutes 1:1 with PT including:    Date  12/20/18 12/13/18 12/4/18 11/27/18 11/20/18 11/15/18 11/13/18   VISIT 7 6/10 5/10 4/10 3/10 2/10 1/10   G CODE VISIT 7 6/10 5/10 4/10 3/10 2/10 1/10   POC EXP. DATE 12/31/18 12/31/18 12/31/18 12/31/18 12/31/18 12/31/18 12/31/18   VISIT AMOUNT  MEDICARE TOTAL 90.28  2595  90.28  2505.62 120.26  2415.34 90.28  2295.08 89.94  2204.80 112.86  2114.86   FACE-TO-FACE  Today 12/13/18 12/13/18 12/13/18              Standing in // bars 5 x 1 min 4 x 1min  3 x 1 min 3 x 1 min 3 x 1 min 3 x 1 min    Weight shift each leg --- --- 4 x8 3 x 10 3 x 10 2 x 10    Marching  3 x 10 3 x 10,  3 x 10 3 x 10 3 x 10 3 x 10    Walking in // bars  6 x 20 ft 4 x 20 ft 3 x 5 ft Not today 3 x 4 ft 3 x 4 ft 4 x 4 ft              Initials [place] GWA 1/6 [place] GWA 1/6 [place]     PT participated in Therapeutic exercise for LE strengthening x 15 minutes :  LAQ 1.5#, 3 x10, seated marches 3 x 10 1.5#, Standing hip abd 2 x 8, mini squats 3  X 8    Home Exercises Provided and Patient Education Provided     Education provided:   - importance of at least standing 2 or 3 times a day, only when someone is around for safety.  -encouragement for patient to try and walk more and stand up    Written Home Exercises Provided: Patient instructed to cont prior HEP.  Exercises were reviewed and Goyo was able to demonstrate them prior to the end of the session.  Goyo demonstrated poor understanding of the education provided.     See EMR under Patient Instructions for exercises provided prior visit.    Assessment     Patient did more ambulation today with therapist. Pt still has weakness with LE, but did more overall in the session today. Pt still needs cues to safely sit down in the wheelchair.                             Goyo is progressing well towards his goals.   Pt prognosis is Fair.     Pt will continue to benefit from skilled outpatient physical therapy to address the deficits listed in the problem list box on initial evaluation, provide pt/family education and to maximize pt's level of independence in the home and community environment.     Pt's spiritual, cultural and educational needs considered and pt agreeable to plan of care and goals.    Anticipated barriers to physical therapy: Patient motivation/complaince.    Goals:  Short Term Goals (4 Weeks):   1. This patient will be independent with a basic HEP.   2. This patient will increase B LE strength by 1 grade in order to be able to perform tub transfer without UE usage.  4. This patient will have a pain rating of 7/10 at worst with ADLs.  5. Patient able to score greater than or equal to 20% intake on the FOTO Lumbar Spine Survey placing patient in 80%<% impaired, limited, or restricted category  demonstrating overall decreased low back pain with functional activities.   6. Pt will ambulate 4 x 20 ft at the parallel bars with CGA x 1 or less assistance-MET    Long Term Goals (7 Weeks):   1. This patient will be independent with an updated HEP of standing.   2. This patient will increase B LE strength to 4-/5 or better in order to be able to stand,amblulate, and transfer.  3. This patient will have a pain rating of 5/10 at worst with ADLs.  4. Patient able to score greater than or equal to 70% limitation on the FOTO Lumbar Spine Survey placing patient in 70% impaired, limited, or restricted category demonstrating overall decreased low back pain with functional activities.   5. Pt to begin standing with walker for 3 minutes, for 3-4 times a day with assistance as needed.    Plan     Continue with Plan Of Care and progress toward PT goals.    Jimmie Sagastume, PT

## 2018-12-27 ENCOUNTER — TELEPHONE (OUTPATIENT)
Dept: REHABILITATION | Facility: HOSPITAL | Age: 73
End: 2018-12-27

## 2018-12-27 PROCEDURE — 93010 ELECTROCARDIOGRAM REPORT: CPT | Mod: S$GLB,,, | Performed by: STUDENT IN AN ORGANIZED HEALTH CARE EDUCATION/TRAINING PROGRAM

## 2018-12-27 PROCEDURE — 93010 PR ELECTROCARDIOGRAM REPORT: ICD-10-PCS | Mod: S$GLB,,, | Performed by: STUDENT IN AN ORGANIZED HEALTH CARE EDUCATION/TRAINING PROGRAM

## 2018-12-27 NOTE — TELEPHONE ENCOUNTER
Called regarding today's No Show, spoke with patient's wife who said he was in the hospital, that he had had a heart attack and his kidneys were failing.  I told her that I was sorry to hear that and that we would remove his future visits so they would not be disturbed during this time.

## 2018-12-28 ENCOUNTER — OUTSIDE PLACE OF SERVICE (OUTPATIENT)
Dept: CARDIOLOGY | Facility: CLINIC | Age: 73
End: 2018-12-28

## 2018-12-28 ENCOUNTER — OUTSIDE PLACE OF SERVICE (OUTPATIENT)
Dept: CARDIOLOGY | Facility: CLINIC | Age: 73
End: 2018-12-28
Payer: MEDICARE

## 2018-12-28 PROCEDURE — 93306 PR ECHO HEART XTHORACIC,COMPLETE W DOPPLER: ICD-10-PCS | Mod: 26,S$GLB,, | Performed by: INTERNAL MEDICINE

## 2018-12-28 PROCEDURE — 93010 ELECTROCARDIOGRAM REPORT: CPT | Mod: S$GLB,,, | Performed by: STUDENT IN AN ORGANIZED HEALTH CARE EDUCATION/TRAINING PROGRAM

## 2018-12-28 PROCEDURE — 93010 PR ELECTROCARDIOGRAM REPORT: ICD-10-PCS | Mod: S$GLB,,, | Performed by: STUDENT IN AN ORGANIZED HEALTH CARE EDUCATION/TRAINING PROGRAM

## 2018-12-28 PROCEDURE — 93306 TTE W/DOPPLER COMPLETE: CPT | Mod: 26,S$GLB,, | Performed by: INTERNAL MEDICINE

## 2018-12-30 ENCOUNTER — OUTSIDE PLACE OF SERVICE (OUTPATIENT)
Dept: CARDIOLOGY | Facility: CLINIC | Age: 73
End: 2018-12-30
Payer: MEDICARE

## 2018-12-30 PROCEDURE — 93010 PR ELECTROCARDIOGRAM REPORT: ICD-10-PCS | Mod: S$GLB,,, | Performed by: INTERNAL MEDICINE

## 2018-12-30 PROCEDURE — 93010 ELECTROCARDIOGRAM REPORT: CPT | Mod: S$GLB,,, | Performed by: INTERNAL MEDICINE

## 2019-01-27 ENCOUNTER — OUTSIDE PLACE OF SERVICE (OUTPATIENT)
Dept: CARDIOLOGY | Facility: CLINIC | Age: 74
End: 2019-01-27
Payer: MEDICARE

## 2019-11-20 NOTE — PLAN OF CARE
"           Create Note        My Note 8:43 AM   Edit              OUTPATIENT PHYSICAL THERAPY  PHYSICAL THERAPY EVALUATION     Name: Goyo Craig  Clinic Number: [id number]     Diagnosis:        Encounter Diagnoses   Name Primary?    Weakness of both lower extremities Yes    Debility      Difficulty walking        Physician: Chrissy Hayes MD  Treatment Orders: PT Eval and Treat     History           Past Medical History:   Diagnosis Date    Acute anterior myocardial infarction 7/10/2007     s/p PTCA    CVA (cerebral infarction)       no deficits    Hypertension      Osteoarthritis      Stroke             Current Outpatient Prescriptions   Medication Sig    aspirin (ECOTRIN) 81 MG EC tablet Take 81 mg by mouth once daily.    atorvastatin (LIPITOR) 20 MG tablet Take 1 tablet (20 mg total) by mouth once daily.    baclofen (LIORESAL) 10 MG tablet Take 10 mg by mouth 3 (three) times daily as needed (muscle spasm).    cilostazol (PLETAL) 100 MG Tab Take 50 mg by mouth 2 (two) times daily.    lisinopril (PRINIVIL,ZESTRIL) 5 MG tablet Take 5 mg by mouth once daily.    tamsulosin (FLOMAX) 0.4 mg Cp24 Take 0.4 mg by mouth once daily.      No current facility-administered medications for this visit.       Review of patient's allergies indicates:  No Known Allergies  Mental status :alert  None  Precautions: Poor balance     Evaluation Date: 6/28/17  Visit # authorized: 1/12  Authorization period: 12/31/17  Plan of care Expiration: 8/9/17     MD referral: Chrissy Hayes MD      Start time: 9:00 AM  Stop Time: 9:45 AM     Timed       Procedure  Min     Units    TE x 1 15 1                              Untimed       Procedure  Min  Units    IE 30 1                         Subjective      Primary concern/ Chief complaints:     Goyo Kong is a 72 y.o. male that presents to [facility] with complaint of feeling "tight and dragging" and weakness in both legs.      Onset/DAKSHA: several " years     PLOF:Limited with all ADL's  Prior Physical Therapy: January, 2017- February, 2017; also in 2016     Occupation: Pt does not work     Pt. presents with the following co-morbidities and personal factors that directly impact  plan of care: CVA several years ago, HTN, leg cramps and swollen ankle; cervical fusion about 2 years ago             Pain Scale: Goyo Kong rates pain on a scale of 0-10 to be  0/10     ADLs: Pt has a decreased ability to perform ADLs such as cooking, housework--has assistance.        Patient Goals:I want to be able to walk without my walker     Objective      Posture Alignment : forward head and shoulders, decreased lordosis lumbar spine. Stands with trunk flexed forward     Sensation: Light Touch: Intact BUE and BLE                                ROM:   UPPER EXTREMITIES: AROM/PROM: (measured in degrees):   · (R) UE: limited as follows: Right shoulder: 140 deg passive Flexion and 110 deg passive Abduction.  ·   Actively: 20 to 30 degrees flexion and abduction; elbow, forearm, wrist and hand WFL                                           · (L) UE: Shoulder:  130 passive flexion and 110 passive abduction; actively 20 to 30 degrees flexiona and abduction  ·  elbow, forearm, wrist and hand WFL            LOWER EXTREMITIES: AROM: WFL BLE     Upper Extremity Strength: (graded 1-5 out of 5)     RUE LUE   Shoulder flexion: 2/5 2/5   Shoulder Abduction: 2/5 2/5   Elbow flexion: 4/5 4/5   Elbow extension: 4/5 4/5   Wrist flexion: 4/5 4/5   Wrist extension: 4/5 4/5    gross assessment 4/5 4/5         Lower Extremity Strength: (graded 1-5 out of 5)     RLE LLE   Hip flexion:  3/5 3/5   Hip Abduction 3/5 3/5   Hip Extension 3/5 3/5   Knee extension: 4/5 4/5   Ankle DF: 4+/5 4+/5   Ankle IV 4+/5 4+/5   Ankle EV 4+/5 4+/5   PF 4+/5 4+/5   Knee Flexion 4/5 4/5      GAIT: Goyo Kong ambulates using rollator with slow steps and short stride. Patient tends to drag both feet during toe off.     30  "Second Chair Stand: Score: 0 as patient uses both hands to push up from sitting  TU:00 minutes  Patient in high risk for falls category     TREATMENT:  Therapeutic exercise: Goyo Kong received therapeutic exercises to develop strength and endurance, flexibility for 15 minutes including:see exercise sheet      Date 17   Visit    Gcode Visits  1/10   POC Exp Date 17   Medicare Charge $108.00   Medicare Total $1336.00   Face to Face     Seated     Glut Sets  10 x 5"   Marching 1 x 10   Hip Abd  1 x 10 RTB   Hip Add  10 x 3" w/ pillow   LAQ (B)  1 x 10                           Initials VK            Functional Status Measures:  Mobility                                       Intake Score: 20/100                         G-code current status:    CM at least 80%, but < 100% impaired, limited or restricted    G-code goal status:         CK at least 40% but < 60% impaired, limited or restricted  PT reviewed FOTO scores for Goyo Craig on 2017.   FOTO scores were entered into [employer] - see media section.        Pt. Education: Pt/family educational information was provided, including: role of PT, goals for PT, scheduling - pt verbalized understanding. Instructed pt. regarding: proper form/technique with all exercises. Pt demo good understanding of the education provided. Goyo Kong demonstrated good return demonstration of activities.  · Pt has no cultural, educational or language barriers to learning provided.     Medical necessity is demonstrated by the following IMPAIRMENTS/PROBLEM LIST:                        1) Weakness limiting function                        2) Posture dysfunction                        3) BLE weakness                        4) Decreased soft tissue extensibility/fascia restriction                        5) Decreased LE flexibility: bilateral hamstring tightness                        6) Lack of HEP                             GOALS:   Short Term Goals:  3 weeks  1.  " Increased MMT for BLE to 4/5 to increase endurance with walking and standing   2. Pt. to demonstrate increased MMT for gluteus medius to 4/5  to increase stability during community ambulation  3.  Increased MMT for hip flexor to 4/5 to increase ability to perform sit to stand transfer   4. Pt to tolerate HEP to improve ROM and independence with ADL's  5. Patient will ambulate with improved gait pattern using rollator     Long Term Goals: 6 weeks  1. . Pt. to demonstrate increased MMT for gluteus medius to 4+/5  to increase stability during ambulation on uneven surfaces.  2. Increased MMT for hip flexor to 4+/5 to increase tolerance for ADL and work activities.   3. Pt to be independent with HEP to improve ROM and independence with ADL's  4. Patient will ambulate with normal gait pattern using rollator     Assessment      This is a 72 y.o. male referred to outpatient physical therapy who presents with a medical diagnosis of debility, lower extremity weakness and dfificulty walking  and PT diagnosis of debility, BUE and BLE weakness/decreased strength; poor posture, poor endurance, difficulty walking demonstrating joint dysfunction and functional limitation as described above.  The goals were discussed with the patient and Mr. Nance is in agreement with proposed treatment plan. Pt was given a HEP consisting of seated lower extremity strengthening exercies. Pt verbally understood instructions and demonstrated proper form/ technique. Pt was advised to perform these exercises free of pain, and to discontinue use if symptoms persist/worsen. Pt will benefit from physical therapy services in order to maximize pain free functional independence.      History  Co-morbidities and personal factors that may impact the plan of care Examination  Body Structures and Functions, activity limitations and participation restrictions that may impact the plan of care Clinical Presentation   Decision Making/ Complexity Score    Co-morbidities:   CVA, cervical fusion                    Personal Factors:   Age Body Regions: bilateral lower extremities     Body Systems: Musculoskeletal: decreased strength BLE; poor posture and flexibility; Neuromuscular: gait instability/difficulty walking              Activity limitations/Participation Restrictions: Mobility; general tasks and demands                     Evolving clinical presentation with changing clinical characteristics       Moderate     FOTO Score: 20 /100            Plan      Outpatient physical therapy  2 times weekly  to include: pt education, HEP, therapeutic exercises, therapeutic activities, neuromuscular re-education/ balance exercises, soft tissue and joint mobilizations;  modalities prn.      Cont PT for  6 weeks. Pt may be seen by PTA as part of the rehabilitation team.      I certify the need for these services furnished under this plan of treatment and while under my care.  ____________________________________ Physician/Referring Practitioner   Date of Signature     Marlyn Goodman PT                                       Statement Selected

## 2020-02-04 ENCOUNTER — OUTSIDE PLACE OF SERVICE (OUTPATIENT)
Dept: FAMILY MEDICINE | Facility: CLINIC | Age: 75
End: 2020-02-04
Payer: MEDICARE

## 2020-02-04 PROCEDURE — 99305 PR NURSING FACILITY CARE, INIT, MOD SEVERITY: ICD-10-PCS | Mod: ,,, | Performed by: FAMILY MEDICINE

## 2020-02-04 PROCEDURE — 99305 1ST NF CARE MODERATE MDM 35: CPT | Mod: ,,, | Performed by: FAMILY MEDICINE

## 2020-03-03 ENCOUNTER — CLINICAL SUPPORT (OUTPATIENT)
Dept: FAMILY MEDICINE | Facility: CLINIC | Age: 75
End: 2020-03-03
Payer: MEDICARE

## 2020-03-03 DIAGNOSIS — Z23 NEED FOR VACCINATION AGAINST STREPTOCOCCUS PNEUMONIAE: ICD-10-CM

## 2020-03-03 DIAGNOSIS — Z23 NEED FOR PROPHYLACTIC VACCINATION AND INOCULATION AGAINST INFLUENZA: Primary | ICD-10-CM

## 2020-03-06 PROCEDURE — G0009 ADMIN PNEUMOCOCCAL VACCINE: HCPCS | Mod: S$GLB,,, | Performed by: FAMILY MEDICINE

## 2020-03-06 PROCEDURE — 90670 PCV13 VACCINE IM: CPT | Mod: S$GLB,,, | Performed by: FAMILY MEDICINE

## 2020-03-06 PROCEDURE — 90662 IIV NO PRSV INCREASED AG IM: CPT | Mod: S$GLB,,, | Performed by: FAMILY MEDICINE

## 2020-03-06 PROCEDURE — 90662 FLU VACCINE - HIGH DOSE (65+) PRESERVATIVE FREE IM: ICD-10-PCS | Mod: S$GLB,,, | Performed by: FAMILY MEDICINE

## 2020-03-06 PROCEDURE — G0008 ADMIN INFLUENZA VIRUS VAC: HCPCS | Mod: S$GLB,,, | Performed by: FAMILY MEDICINE

## 2020-03-06 PROCEDURE — 90670 PNEUMOCOCCAL CONJUGATE VACCINE 13-VALENT LESS THAN 5YO & GREATER THAN: ICD-10-PCS | Mod: S$GLB,,, | Performed by: FAMILY MEDICINE

## 2020-03-06 PROCEDURE — G0009 PNEUMOCOCCAL CONJUGATE VACCINE 13-VALENT LESS THAN 5YO & GREATER THAN: ICD-10-PCS | Mod: S$GLB,,, | Performed by: FAMILY MEDICINE

## 2020-03-06 PROCEDURE — G0008 FLU VACCINE - HIGH DOSE (65+) PRESERVATIVE FREE IM: ICD-10-PCS | Mod: S$GLB,,, | Performed by: FAMILY MEDICINE

## 2020-04-06 ENCOUNTER — HOSPITAL ENCOUNTER (INPATIENT)
Facility: HOSPITAL | Age: 75
LOS: 3 days | Discharge: HOME | DRG: 195 | End: 2020-04-09
Attending: EMERGENCY MEDICINE | Admitting: EMERGENCY MEDICINE
Payer: MEDICARE

## 2020-04-06 DIAGNOSIS — Z20.822 SUSPECTED COVID-19 VIRUS INFECTION: Primary | ICD-10-CM

## 2020-04-06 LAB
ALBUMIN SERPL BCP-MCNC: 2.8 G/DL (ref 3.5–5.2)
ALP SERPL-CCNC: 77 U/L (ref 55–135)
ALT SERPL W/O P-5'-P-CCNC: 7 U/L (ref 10–44)
ANION GAP SERPL CALC-SCNC: 8 MMOL/L (ref 8–16)
AST SERPL-CCNC: 11 U/L (ref 10–40)
BASOPHILS # BLD AUTO: 0.03 K/UL (ref 0–0.2)
BASOPHILS NFR BLD: 0.3 % (ref 0–1.9)
BILIRUB SERPL-MCNC: 0.4 MG/DL (ref 0.1–1)
BNP SERPL-MCNC: 10 PG/ML (ref 0–99)
BUN SERPL-MCNC: 12 MG/DL (ref 8–23)
CALCIUM SERPL-MCNC: 8.8 MG/DL (ref 8.7–10.5)
CHLORIDE SERPL-SCNC: 108 MMOL/L (ref 95–110)
CK SERPL-CCNC: 484 U/L (ref 20–200)
CO2 SERPL-SCNC: 24 MMOL/L (ref 23–29)
CREAT SERPL-MCNC: 0.8 MG/DL (ref 0.5–1.4)
CRP SERPL-MCNC: 94.5 MG/L (ref 0–8.2)
D DIMER PPP IA.FEU-MCNC: 1.85 MG/L FEU
DIFFERENTIAL METHOD: ABNORMAL
EOSINOPHIL # BLD AUTO: 0.2 K/UL (ref 0–0.5)
EOSINOPHIL NFR BLD: 1.8 % (ref 0–8)
ERYTHROCYTE [DISTWIDTH] IN BLOOD BY AUTOMATED COUNT: 16 % (ref 11.5–14.5)
EST. GFR  (AFRICAN AMERICAN): >60 ML/MIN/1.73 M^2
EST. GFR  (NON AFRICAN AMERICAN): >60 ML/MIN/1.73 M^2
FERRITIN SERPL-MCNC: 673 NG/ML (ref 20–300)
GLUCOSE SERPL-MCNC: 90 MG/DL (ref 70–110)
HCT VFR BLD AUTO: 36.2 % (ref 40–54)
HGB BLD-MCNC: 11.3 G/DL (ref 14–18)
IMM GRANULOCYTES # BLD AUTO: 0.04 K/UL (ref 0–0.04)
IMM GRANULOCYTES NFR BLD AUTO: 0.4 % (ref 0–0.5)
LACTATE SERPL-SCNC: 0.8 MMOL/L (ref 0.5–2.2)
LDH SERPL L TO P-CCNC: 184 U/L (ref 110–260)
LYMPHOCYTES # BLD AUTO: 2.1 K/UL (ref 1–4.8)
LYMPHOCYTES NFR BLD: 18.9 % (ref 18–48)
MCH RBC QN AUTO: 26.7 PG (ref 27–31)
MCHC RBC AUTO-ENTMCNC: 31.2 G/DL (ref 32–36)
MCV RBC AUTO: 85 FL (ref 82–98)
MONOCYTES # BLD AUTO: 0.6 K/UL (ref 0.3–1)
MONOCYTES NFR BLD: 5.5 % (ref 4–15)
NEUTROPHILS # BLD AUTO: 8.3 K/UL (ref 1.8–7.7)
NEUTROPHILS NFR BLD: 73.1 % (ref 38–73)
NRBC BLD-RTO: 0 /100 WBC
PLATELET # BLD AUTO: 263 K/UL (ref 150–350)
PMV BLD AUTO: 9.7 FL (ref 9.2–12.9)
POTASSIUM SERPL-SCNC: 3.8 MMOL/L (ref 3.5–5.1)
PROCALCITONIN SERPL IA-MCNC: 0.03 NG/ML
PROT SERPL-MCNC: 7.9 G/DL (ref 6–8.4)
RBC # BLD AUTO: 4.24 M/UL (ref 4.6–6.2)
SARS-COV-2 RDRP RESP QL NAA+PROBE: NEGATIVE
SODIUM SERPL-SCNC: 140 MMOL/L (ref 136–145)
TROPONIN I SERPL DL<=0.01 NG/ML-MCNC: 0.01 NG/ML (ref 0–0.03)
WBC # BLD AUTO: 11.33 K/UL (ref 3.9–12.7)

## 2020-04-06 PROCEDURE — 93005 ELECTROCARDIOGRAM TRACING: CPT

## 2020-04-06 PROCEDURE — 93010 ELECTROCARDIOGRAM REPORT: CPT | Mod: ,,, | Performed by: INTERNAL MEDICINE

## 2020-04-06 PROCEDURE — 99285 EMERGENCY DEPT VISIT HI MDM: CPT | Mod: 25

## 2020-04-06 PROCEDURE — 85379 FIBRIN DEGRADATION QUANT: CPT

## 2020-04-06 PROCEDURE — 83615 LACTATE (LD) (LDH) ENZYME: CPT

## 2020-04-06 PROCEDURE — 83880 ASSAY OF NATRIURETIC PEPTIDE: CPT

## 2020-04-06 PROCEDURE — 93010 EKG 12-LEAD: ICD-10-PCS | Mod: ,,, | Performed by: INTERNAL MEDICINE

## 2020-04-06 PROCEDURE — 80053 COMPREHEN METABOLIC PANEL: CPT

## 2020-04-06 PROCEDURE — 86140 C-REACTIVE PROTEIN: CPT

## 2020-04-06 PROCEDURE — U0002 COVID-19 LAB TEST NON-CDC: HCPCS

## 2020-04-06 PROCEDURE — 12000002 HC ACUTE/MED SURGE SEMI-PRIVATE ROOM

## 2020-04-06 PROCEDURE — 84484 ASSAY OF TROPONIN QUANT: CPT

## 2020-04-06 PROCEDURE — 99223 1ST HOSP IP/OBS HIGH 75: CPT | Mod: ,,, | Performed by: FAMILY MEDICINE

## 2020-04-06 PROCEDURE — 83605 ASSAY OF LACTIC ACID: CPT

## 2020-04-06 PROCEDURE — 85025 COMPLETE CBC W/AUTO DIFF WBC: CPT

## 2020-04-06 PROCEDURE — 84145 PROCALCITONIN (PCT): CPT

## 2020-04-06 PROCEDURE — 82728 ASSAY OF FERRITIN: CPT

## 2020-04-06 PROCEDURE — 99223 PR INITIAL HOSPITAL CARE,LEVL III: ICD-10-PCS | Mod: ,,, | Performed by: FAMILY MEDICINE

## 2020-04-06 PROCEDURE — 99285 EMERGENCY DEPT VISIT HI MDM: CPT | Mod: ,,, | Performed by: EMERGENCY MEDICINE

## 2020-04-06 PROCEDURE — 63600175 PHARM REV CODE 636 W HCPCS: Performed by: EMERGENCY MEDICINE

## 2020-04-06 PROCEDURE — 99285 PR EMERGENCY DEPT VISIT,LEVEL V: ICD-10-PCS | Mod: ,,, | Performed by: EMERGENCY MEDICINE

## 2020-04-06 PROCEDURE — 82550 ASSAY OF CK (CPK): CPT

## 2020-04-06 PROCEDURE — 87040 BLOOD CULTURE FOR BACTERIA: CPT

## 2020-04-06 RX ORDER — CEFTRIAXONE 1 G/1
1 INJECTION, POWDER, FOR SOLUTION INTRAMUSCULAR; INTRAVENOUS
Status: COMPLETED | OUTPATIENT
Start: 2020-04-06 | End: 2020-04-06

## 2020-04-06 RX ADMIN — CEFTRIAXONE SODIUM 1 G: 1 INJECTION, POWDER, FOR SOLUTION INTRAMUSCULAR; INTRAVENOUS at 11:04

## 2020-04-06 RX ADMIN — AZITHROMYCIN 500 MG: 500 INJECTION, POWDER, LYOPHILIZED, FOR SOLUTION INTRAVENOUS at 11:04

## 2020-04-07 PROBLEM — I10 ESSENTIAL HYPERTENSION: Chronic | Status: ACTIVE | Noted: 2020-04-07

## 2020-04-07 PROBLEM — J18.9 COMMUNITY ACQUIRED PNEUMONIA: Status: ACTIVE | Noted: 2020-04-06

## 2020-04-07 PROBLEM — S31.109A WOUND OF GROIN: Status: ACTIVE | Noted: 2020-04-07

## 2020-04-07 LAB
INFLUENZA A, MOLECULAR: NEGATIVE
INFLUENZA B, MOLECULAR: NEGATIVE
SPECIMEN SOURCE: NORMAL

## 2020-04-07 PROCEDURE — 63600175 PHARM REV CODE 636 W HCPCS: Performed by: PHYSICIAN ASSISTANT

## 2020-04-07 PROCEDURE — 99232 SBSQ HOSP IP/OBS MODERATE 35: CPT | Mod: ,,, | Performed by: STUDENT IN AN ORGANIZED HEALTH CARE EDUCATION/TRAINING PROGRAM

## 2020-04-07 PROCEDURE — 99232 PR SUBSEQUENT HOSPITAL CARE,LEVL II: ICD-10-PCS | Mod: ,,, | Performed by: STUDENT IN AN ORGANIZED HEALTH CARE EDUCATION/TRAINING PROGRAM

## 2020-04-07 PROCEDURE — 87502 INFLUENZA DNA AMP PROBE: CPT

## 2020-04-07 PROCEDURE — 11000001 HC ACUTE MED/SURG PRIVATE ROOM

## 2020-04-07 PROCEDURE — 63700000 PHARM REV CODE 250 ALT 637 W/O HCPCS: Performed by: PHYSICIAN ASSISTANT

## 2020-04-07 PROCEDURE — 25000003 PHARM REV CODE 250: Performed by: PHYSICIAN ASSISTANT

## 2020-04-07 RX ORDER — ACETAMINOPHEN 325 MG/1
650 TABLET ORAL EVERY 4 HOURS PRN
Status: DISCONTINUED | OUTPATIENT
Start: 2020-04-07 | End: 2020-04-09 | Stop reason: HOSPADM

## 2020-04-07 RX ORDER — TAMSULOSIN HYDROCHLORIDE 0.4 MG/1
0.4 CAPSULE ORAL DAILY
Status: DISCONTINUED | OUTPATIENT
Start: 2020-04-07 | End: 2020-04-09 | Stop reason: HOSPADM

## 2020-04-07 RX ORDER — BENZONATATE 100 MG/1
100 CAPSULE ORAL 3 TIMES DAILY PRN
Status: DISCONTINUED | OUTPATIENT
Start: 2020-04-07 | End: 2020-04-09 | Stop reason: HOSPADM

## 2020-04-07 RX ORDER — LISINOPRIL 10 MG/1
10 TABLET ORAL DAILY
Status: DISCONTINUED | OUTPATIENT
Start: 2020-04-07 | End: 2020-04-09 | Stop reason: HOSPADM

## 2020-04-07 RX ORDER — BACLOFEN 10 MG/1
10 TABLET ORAL 3 TIMES DAILY PRN
Status: DISCONTINUED | OUTPATIENT
Start: 2020-04-07 | End: 2020-04-09 | Stop reason: HOSPADM

## 2020-04-07 RX ORDER — CILOSTAZOL 100 MG/1
100 TABLET ORAL 2 TIMES DAILY
Status: DISCONTINUED | OUTPATIENT
Start: 2020-04-07 | End: 2020-04-09 | Stop reason: HOSPADM

## 2020-04-07 RX ORDER — ENOXAPARIN SODIUM 100 MG/ML
40 INJECTION SUBCUTANEOUS EVERY 24 HOURS
Status: DISCONTINUED | OUTPATIENT
Start: 2020-04-07 | End: 2020-04-09 | Stop reason: HOSPADM

## 2020-04-07 RX ORDER — TALC
6 POWDER (GRAM) TOPICAL NIGHTLY PRN
Status: DISCONTINUED | OUTPATIENT
Start: 2020-04-07 | End: 2020-04-09 | Stop reason: HOSPADM

## 2020-04-07 RX ORDER — FINASTERIDE 5 MG/1
5 TABLET, FILM COATED ORAL DAILY
Status: DISCONTINUED | OUTPATIENT
Start: 2020-04-07 | End: 2020-04-09 | Stop reason: HOSPADM

## 2020-04-07 RX ORDER — MELOXICAM 7.5 MG/1
7.5 TABLET ORAL DAILY
Status: DISCONTINUED | OUTPATIENT
Start: 2020-04-07 | End: 2020-04-08

## 2020-04-07 RX ORDER — AZITHROMYCIN 250 MG/1
500 TABLET, FILM COATED ORAL
Status: COMPLETED | OUTPATIENT
Start: 2020-04-07 | End: 2020-04-08

## 2020-04-07 RX ORDER — ONDANSETRON 2 MG/ML
4 INJECTION INTRAMUSCULAR; INTRAVENOUS EVERY 8 HOURS PRN
Status: DISCONTINUED | OUTPATIENT
Start: 2020-04-07 | End: 2020-04-09 | Stop reason: HOSPADM

## 2020-04-07 RX ORDER — GLUCAGON 1 MG
1 KIT INJECTION
Status: DISCONTINUED | OUTPATIENT
Start: 2020-04-07 | End: 2020-04-09 | Stop reason: HOSPADM

## 2020-04-07 RX ORDER — ALBUTEROL SULFATE 90 UG/1
2 AEROSOL, METERED RESPIRATORY (INHALATION) EVERY 6 HOURS PRN
Status: DISCONTINUED | OUTPATIENT
Start: 2020-04-07 | End: 2020-04-09 | Stop reason: HOSPADM

## 2020-04-07 RX ORDER — IBUPROFEN 200 MG
24 TABLET ORAL
Status: DISCONTINUED | OUTPATIENT
Start: 2020-04-07 | End: 2020-04-09 | Stop reason: HOSPADM

## 2020-04-07 RX ORDER — IBUPROFEN 200 MG
16 TABLET ORAL
Status: DISCONTINUED | OUTPATIENT
Start: 2020-04-07 | End: 2020-04-09 | Stop reason: HOSPADM

## 2020-04-07 RX ORDER — LOPERAMIDE HYDROCHLORIDE 2 MG/1
2 CAPSULE ORAL EVERY 6 HOURS PRN
Status: DISCONTINUED | OUTPATIENT
Start: 2020-04-07 | End: 2020-04-09 | Stop reason: HOSPADM

## 2020-04-07 RX ORDER — SODIUM CHLORIDE 0.9 % (FLUSH) 0.9 %
10 SYRINGE (ML) INJECTION
Status: DISCONTINUED | OUTPATIENT
Start: 2020-04-07 | End: 2020-04-09 | Stop reason: HOSPADM

## 2020-04-07 RX ORDER — CEFTRIAXONE 1 G/1
1 INJECTION, POWDER, FOR SOLUTION INTRAMUSCULAR; INTRAVENOUS NIGHTLY
Status: DISCONTINUED | OUTPATIENT
Start: 2020-04-07 | End: 2020-04-09 | Stop reason: HOSPADM

## 2020-04-07 RX ORDER — CYANOCOBALAMIN (VITAMIN B-12) 250 MCG
500 TABLET ORAL DAILY
Status: DISCONTINUED | OUTPATIENT
Start: 2020-04-07 | End: 2020-04-09 | Stop reason: HOSPADM

## 2020-04-07 RX ADMIN — CILOSTAZOL 100 MG: 100 TABLET ORAL at 09:04

## 2020-04-07 RX ADMIN — AZITHROMYCIN MONOHYDRATE 500 MG: 250 TABLET ORAL at 09:04

## 2020-04-07 RX ADMIN — CEFTRIAXONE SODIUM 1 G: 1 INJECTION, POWDER, FOR SOLUTION INTRAMUSCULAR; INTRAVENOUS at 09:04

## 2020-04-07 RX ADMIN — MELOXICAM 7.5 MG: 7.5 TABLET ORAL at 10:04

## 2020-04-07 RX ADMIN — CYANOCOBALAMIN TAB 250 MCG 500 MCG: 250 TAB at 09:04

## 2020-04-07 RX ADMIN — FINASTERIDE 5 MG: 5 TABLET, FILM COATED ORAL at 10:04

## 2020-04-07 RX ADMIN — TAMSULOSIN HYDROCHLORIDE 0.4 MG: 0.4 CAPSULE ORAL at 09:04

## 2020-04-07 RX ADMIN — Medication 6 MG: at 10:04

## 2020-04-07 RX ADMIN — LISINOPRIL 10 MG: 10 TABLET ORAL at 10:04

## 2020-04-07 RX ADMIN — CILOSTAZOL 100 MG: 100 TABLET ORAL at 11:04

## 2020-04-07 RX ADMIN — ENOXAPARIN SODIUM 40 MG: 100 INJECTION SUBCUTANEOUS at 09:04

## 2020-04-07 NOTE — PLAN OF CARE
04/07/20 1051   Post-Acute Status   Post-Acute Authorization Placement   Post-Acute Placement Status Referrals Sent   SW following patient for post acute needs. Patient currently a correction resident at [facility]. Sw sent referral to provider via [employer]. SW will continue to follow. SW in communication with CM and patient care team.  Irma Vizcarra LMSW [facility]     (11:20AM) SW outreached [facility] via phone at [phone] to follow up on patient dc status. Transferred to admission department. No answer. Left voicemail requesting return call.  Irma Vizcarra LMSW [facility]     (11:33AM) SW received call from [place] with [facility]. Provider states patient is able to return back to NH once medically cleared due to testing negative for Covid. Patient will be required to return back to to [facility] to quarantine for 14 days. Provider states patient will need to arrive at facility no later than 3:00PM on day of discharge. Provider reports will send patient referral packet to Medical Director for review.   Irma Vizcarra LMSW [facility]
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
## 2020-04-07 NOTE — ED NOTES
Spoke w Team to clarify tele orders, : tele ordered w NO  continuous pulse ox needs at this time.     Box #[id number] placed, will initiate transport.
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
## 2020-04-07 NOTE — ED NOTES
Patient experienced drop in saturation from 93-95 down to 82% w a good waveform, Placed him on NC at 3 L/M w near immediate improvement noted. Dr. Hayden  aware.

## 2020-04-07 NOTE — PLAN OF CARE
Unable to reach patient on room or cell phone for d/c planning assessment. Spoke with patient's brother Tyler. Brother reports patient is a snf resident at [facility] in [place] for the last 3 months. Anticipate patient will return there. SW informed and will send updated clinicals and referral to facility. Will continue to monitor for discharge needs.     04/07/20 1015   Discharge Assessment   Assessment Type Discharge Planning Assessment   Confirmed/corrected address and phone number on facesheet? Yes   Assessment information obtained from? Caregiver   Expected Length of Stay (days)   (7)   Communicated expected length of stay with patient/caregiver yes   Prior to hospitilization cognitive status: Alert/Oriented   Prior to hospitalization functional status: Assistive Equipment;Needs Assistance   Current cognitive status: Alert/Oriented   Current Functional Status: Assistive Equipment;Needs Assistance   Facility Arrived From: [facility]   Lives With facility resident   Able to Return to Prior Arrangements yes   Is patient able to care for self after discharge? Yes   Who are your caregiver(s) and their phone number(s)?   (Tyler Craig (Brother) [phone])   Patient's perception of discharge disposition nursing home   Readmission Within the Last 30 Days no previous admission in last 30 days   Patient currently being followed by outpatient case management? No   Patient currently receives any other outside agency services? No   Equipment Currently Used at Home bedside commode;rollator;shower chair   Do you have any problems affording any of your prescribed medications? No   Is the patient taking medications as prescribed? yes   Does the patient have transportation home? Yes   Transportation Anticipated health plan transportation   Does the patient receive services at [facility]? No   Discharge Plan A Return to nursing home   Discharge Plan B Return to Nursing Home   DME Needed Upon  Discharge  none   Patient/Family in Agreement with Plan yes

## 2020-04-07 NOTE — ED TRIAGE NOTES
Arrived via EMS from nursing home w CC: mild pneumonia. Dr. Mondragon here aND WORK UP INITIATED.

## 2020-04-07 NOTE — PROGRESS NOTES
Hospital Medicine  Progress Note  [facility]      Patient Name: Goyo Craig Jr.  MRN:  [id number]  Hospital Medicine Team: [facility] R Palak Meng PA-C  Date of Admission:  4/6/2020     Length of Stay:  LOS: 1 day       Principal Problem:  Suspected Covid-19 Virus Infection      Hospital Course:  Patient admitted for evaluation of possible COVID-19.    Interval History:     Patient afebrile and oxygen saturation is at 98% on 3L of nasal cannula.  Found to have right lower lung pneumonia on CXR.  Covid-19 negative and started azithromycin and ceftriaxone for community acquired pneumonia.  Will plan to transfer patient to noncovid team tomorrow         Review of Systems:  Respiratory:negative for SOB, cough  Cardiovascular:no chest pain, palpitations  GI:negative for diarrhea, vomiting  MS:+ stiffness from arthritis     Inpatient Medications:    Current Facility-Administered Medications:     acetaminophen tablet 650 mg, 650 mg, Oral, Q4H PRN, Tracy Smart PA-C    albuterol inhaler 2 puff, 2 puff, Inhalation, Q6H PRN **AND** MDI Q6H PRN, , , Q6H PRN, Tracy Smart PA-C    azithromycin tablet 500 mg, 500 mg, Oral, Q24H, Tracy Smart PA-C    baclofen tablet 10 mg, 10 mg, Oral, TID PRN, Tracy Smart PA-C    benzonatate capsule 100 mg, 100 mg, Oral, TID PRN, Tracy Smart PA-C    cefTRIAXone injection 1 g, 1 g, Intravenous, QHS, Tracy Smart PA-C    cilostazoL tablet 100 mg, 100 mg, Oral, BID, Tracy Smart PA-C, 100 mg at 04/07/20 1154    cyanocobalamin tablet 500 mcg, 500 mcg, Oral, Daily, Tracy Smart PA-C, 500 mcg at 04/07/20 0910    dextrose 10% (D10W) Bolus, 12.5 g, Intravenous, PRN, Raul Obrien MD    dextrose 10% (D10W) Bolus, 25 g, Intravenous, PRN, Raul Obrien MD    enoxaparin injection 40 mg, 40 mg, Subcutaneous, Daily, Tracy Smart PACaraC    finasteride tablet 5 mg, 5 mg, Oral,  "Daily, Tracy Smart PA-C, 5 mg at 04/07/20 1000    glucagon (human recombinant) injection 1 mg, 1 mg, Intramuscular, PRN, Tracy Smart PA-C    glucose chewable tablet 16 g, 16 g, Oral, PRN, Tracy Smart PA-C    glucose chewable tablet 24 g, 24 g, Oral, PRN, Tracy Smart PA-C    lisinopriL tablet 10 mg, 10 mg, Oral, Daily, SHEREE Bean-C, 10 mg at 04/07/20 1000    loperamide capsule 2 mg, 2 mg, Oral, Q6H PRN, Tracy Smart PA-C    melatonin tablet 6 mg, 6 mg, Oral, Nightly PRN, Tracy Smart PA-C    meloxicam tablet 7.5 mg, 7.5 mg, Oral, Daily, Tracy Smart PA-C, 7.5 mg at 04/07/20 1000    ondansetron injection 4 mg, 4 mg, Intravenous, Q8H PRN, Tracy Smart PA-C    promethazine (PHENERGAN) 6.25 mg in dextrose 5 % 50 mL IVPB, 6.25 mg, Intravenous, Q6H PRN, Tracy Smart PA-C    sodium chloride 0.9% flush 10 mL, 10 mL, Intravenous, PRN, Jeannette Rutledge MD    sodium chloride 0.9% flush 10 mL, 10 mL, Intravenous, PRN, Tracy Smart PA-C    tamsulosin 24 hr capsule 0.4 mg, 0.4 mg, Oral, Daily, Tracy Smart PA-C, 0.4 mg at 04/07/20 0910      Physical Exam:      Intake/Output Summary (Last 24 hours) at 4/7/2020 1405  Last data filed at 4/7/2020 0047  Gross per 24 hour   Intake 250 ml   Output --   Net 250 ml     Wt Readings from Last 3 Encounters:   04/07/20 83.3 kg (183 lb 8.5 oz)   06/12/18 78.9 kg (174 lb)   03/18/18 81.2 kg (179 lb)       BP (!) 153/80 (BP Location: Right arm, Patient Position: Lying)   Pulse 74   Temp 97.6 °F (36.4 °C) (Oral)   Resp 12   Ht 6' 1" (1.854 m)   Wt 83.3 kg (183 lb 8.5 oz)   SpO2 98%   BMI 24.21 kg/m²     GEN: NAD, conversant  Resp: decreased breath sounds in right lung, normal work of breathing   CV: RRR,no edema  GI: soft, NTND  Skin: no rash    Laboratory:  Lab Results   Component Value Date    [id number] Negative 04/06/2020       Recent Labs   Lab 04/06/20  2111   WBC " 11.33   LYMPH 18.9  2.1   HGB 11.3*   HCT 36.2*        Recent Labs   Lab 04/06/20 2111      K 3.8      CO2 24   BUN 12   CREATININE 0.8   GLU 90   CALCIUM 8.8     Recent Labs   Lab 04/06/20 2111   ALKPHOS 77   ALT 7*   AST 11   ALBUMIN 2.8*   PROT 7.9   BILITOT 0.4        Recent Labs     04/06/20 2111   DDIMER 1.85*   FERRITIN 673*   CRP 94.5*      BNP 10   TROPONINI 0.014       All labs within the last 24 hours were reviewed.     Microbiology:  Microbiology Results (last 7 days)     Procedure Component Value Units Date/Time    Blood culture x two cultures. Draw prior to antibiotics. [[id number]] Collected:  04/06/20 2111    Order Status:  Completed Specimen:  Blood from Peripheral, Hand, Right Updated:  04/07/20 0515     Blood Culture, Routine No Growth to date    Narrative:       Aerobic and anaerobic    Blood culture x two cultures. Draw prior to antibiotics. [[id number]] Collected:  04/06/20 2112    Order Status:  Completed Specimen:  Blood from Peripheral, Hand, Left Updated:  04/07/20 0515     Blood Culture, Routine No Growth to date    Narrative:       Aerobic and anaerobic    Influenza A & B by Molecular [[id number]] Collected:  04/07/20 0110    Order Status:  Completed Specimen:  Nasopharyngeal Swab from Nasal Swab Updated:  04/07/20 0141     Influenza A, Molecular Negative     Influenza B, Molecular Negative     Flu A & B Source NP    Culture, Respiratory with Gram Stain [[id number]]     Order Status:  No result Specimen:  Sputum, Expectorated             Imaging  ECG Results          EKG 12-lead (Final result)  Result time 04/07/20 09:59:30    Final result by Interface, Lab In [facility] (04/07/20 09:59:30)                 Narrative:    Test Reason : R68.89,    Vent. Rate : 080 BPM     Atrial Rate : 080 BPM     P-R Int : 196 ms          QRS Dur : 074 ms      QT Int : 392 ms       P-R-T Axes : 063 -20 013 degrees     QTc Int : 452 ms    Normal sinus rhythm  Low voltage, precordial  leads  Otherwise normal ECG  When compared with ECG of 30-DEC-2018 09:22,  Nonspecific ST abnormality No longer present  Confirmed by REJI HOGAN MD ([id number]) on 4/7/2020 9:59:14 AM    Referred By: CARLOTA   SELF           Confirmed By:REJI HOGAN MD                              No results found for this or any previous visit.    X-Ray Chest AP Portable  Narrative: EXAMINATION:  XR CHEST AP PORTABLE    CLINICAL HISTORY:  Suspected Covid-19 Virus Infection;    TECHNIQUE:  Single frontal view of the chest was performed.    COMPARISON:  03/21/2018.    FINDINGS:  There are postoperative changes in the base of the neck.  Monitoring EKG leads are present.  The trachea is unremarkable.  There are calcifications of the aortic knob.  The cardiomediastinal silhouette is within normal limits.  There is no evidence of free air beneath the hemidiaphragms.  There are no pleural effusions.  There is no evidence of a pneumothorax.  There is no evidence of pneumomediastinum.  There are diffuse ground-glass airspace opacities throughout the lung fields.  There are degenerative changes in the osseous structures.  Impression: Diffuse ground-glass airspace opacities throughout the lung fields.  The findings may represent viral or bacterial pneumonia.    Electronically signed by: Bobby Pfeiffer MD  Date:    04/06/2020  Time:    21:42      All imaging within the last 24 hours was reviewed.     Assessment and Plan:    Active Hospital Problems    Diagnosis  POA    *Suspected Covid-19 Virus Infection [R68.89]  Yes    Essential hypertension [I10]  Yes     Chronic    BPH (benign prostatic hyperplasia) [N40.0]  Yes     Chronic    Coronary artery disease involving native coronary artery of native heart without angina pectoris [I25.10]  Yes     Chronic    Old lacunar stroke without late effect [Z86.73]  Not Applicable     Chronic      Resolved Hospital Problems   No resolved problems to display.     Community acquired pneumonia   Seen on  "CXR  Covid 19 and flu negative    3L nasal cannula   Ceftriaxone and azithromycin for CAP coverage  Transfer to general medicine team tomorrow     H/o CVA  Patient is alert, but not oriented.   Resides at [facility].     DELIRIUM BEHAVIOR MANAGEMENT  Minimize use of restraints; if physical restraints necessary, please utilize medical/chemical prns for agitation.   Keep shades open and room lit during day and room dim at night in order to promote healthy circadian rhythms.   Keep whiteboard in patient's room current with the date and name of the members of patient's team for easy patient self re-orientation.  avoid benzodiazepines, antihistamines, anticholinergics, hypnotics, and minimize opiates while controlling for pain as these medications may exacerbate delirium.     Coronary artery disease involving native coronary artery of native heart without angina pectoris  Continue secondary prevention.     BPH  Continue tamsulosin 0.4mg daily, finasteride 5mg daily.     HTN  Continue lisinopril 10mg daily.     Wound of Groin  Wound care consulted 4/7/20     VTE High Risk Prophylaxis: enoxaparin 40mg sq QHS @ 2100 (bundled care) if GFR >30       Goals of care, counseling/discussion  - Reviewed the typical clinical course of COVID19 with Goyo Craig, including the potential for acute decompensation requiring intubation and mechanical ventilation.  Patient would like to remain full code   If patient transitions to Comfort-Focused Care, please place "Nurse Communication: End of Life Care, family members allowed to visit, including spouse/partner and adult children [please list names]. Please ask family to visit as a group and leave as a group.       Subsequent Hospital Care  Level 2 09749 Total visit time was 25 minutes or greater with greater than 50% of time spent in counseling and coordination of care.       Palak Meng PA-C       "

## 2020-04-07 NOTE — NURSING
Report received ,care assumed, patient arrived via stretcher AAO x3. Pt lying supine in bed, Pt denies pain or any other concerns at this time. See assessment. Patient oriented to room. Bed in lowest position, side rails up x 2, bed wheels locked and call light within reach, NADK, Pt with St II pressure ulcer to coccyx and Abscess to left groin area with purulent drainage, will continue to monitor.    O2 sats @ 96% on 3L NC

## 2020-04-08 LAB
ALBUMIN SERPL BCP-MCNC: 2.3 G/DL (ref 3.5–5.2)
ALP SERPL-CCNC: 61 U/L (ref 55–135)
ALT SERPL W/O P-5'-P-CCNC: 6 U/L (ref 10–44)
ANION GAP SERPL CALC-SCNC: 4 MMOL/L (ref 8–16)
AST SERPL-CCNC: 9 U/L (ref 10–40)
BASOPHILS # BLD AUTO: 0.01 K/UL (ref 0–0.2)
BASOPHILS NFR BLD: 0.2 % (ref 0–1.9)
BILIRUB SERPL-MCNC: 0.3 MG/DL (ref 0.1–1)
BUN SERPL-MCNC: 13 MG/DL (ref 8–23)
CALCIUM SERPL-MCNC: 8.4 MG/DL (ref 8.7–10.5)
CHLORIDE SERPL-SCNC: 105 MMOL/L (ref 95–110)
CO2 SERPL-SCNC: 26 MMOL/L (ref 23–29)
CREAT SERPL-MCNC: 0.7 MG/DL (ref 0.5–1.4)
CRP SERPL-MCNC: 77.9 MG/L (ref 0–8.2)
DIFFERENTIAL METHOD: ABNORMAL
EOSINOPHIL # BLD AUTO: 0.2 K/UL (ref 0–0.5)
EOSINOPHIL NFR BLD: 4.2 % (ref 0–8)
ERYTHROCYTE [DISTWIDTH] IN BLOOD BY AUTOMATED COUNT: 15.9 % (ref 11.5–14.5)
ERYTHROCYTE [SEDIMENTATION RATE] IN BLOOD BY WESTERGREN METHOD: 91 MM/HR (ref 0–23)
EST. GFR  (AFRICAN AMERICAN): >60 ML/MIN/1.73 M^2
EST. GFR  (NON AFRICAN AMERICAN): >60 ML/MIN/1.73 M^2
GLUCOSE SERPL-MCNC: 77 MG/DL (ref 70–110)
HCT VFR BLD AUTO: 33 % (ref 40–54)
HGB BLD-MCNC: 10.1 G/DL (ref 14–18)
IMM GRANULOCYTES # BLD AUTO: 0.02 K/UL (ref 0–0.04)
IMM GRANULOCYTES NFR BLD AUTO: 0.4 % (ref 0–0.5)
LYMPHOCYTES # BLD AUTO: 1.7 K/UL (ref 1–4.8)
LYMPHOCYTES NFR BLD: 30.2 % (ref 18–48)
MAGNESIUM SERPL-MCNC: 1.6 MG/DL (ref 1.6–2.6)
MCH RBC QN AUTO: 26.2 PG (ref 27–31)
MCHC RBC AUTO-ENTMCNC: 30.6 G/DL (ref 32–36)
MCV RBC AUTO: 86 FL (ref 82–98)
MONOCYTES # BLD AUTO: 0.5 K/UL (ref 0.3–1)
MONOCYTES NFR BLD: 7.9 % (ref 4–15)
NEUTROPHILS # BLD AUTO: 3.2 K/UL (ref 1.8–7.7)
NEUTROPHILS NFR BLD: 57.1 % (ref 38–73)
NRBC BLD-RTO: 0 /100 WBC
PHOSPHATE SERPL-MCNC: 3.4 MG/DL (ref 2.7–4.5)
PLATELET # BLD AUTO: 252 K/UL (ref 150–350)
PMV BLD AUTO: 10.3 FL (ref 9.2–12.9)
POTASSIUM SERPL-SCNC: 3.7 MMOL/L (ref 3.5–5.1)
PROT SERPL-MCNC: 6.8 G/DL (ref 6–8.4)
RBC # BLD AUTO: 3.85 M/UL (ref 4.6–6.2)
SODIUM SERPL-SCNC: 135 MMOL/L (ref 136–145)
WBC # BLD AUTO: 5.67 K/UL (ref 3.9–12.7)

## 2020-04-08 PROCEDURE — 83735 ASSAY OF MAGNESIUM: CPT

## 2020-04-08 PROCEDURE — 80053 COMPREHEN METABOLIC PANEL: CPT

## 2020-04-08 PROCEDURE — 11000001 HC ACUTE MED/SURG PRIVATE ROOM

## 2020-04-08 PROCEDURE — 63600175 PHARM REV CODE 636 W HCPCS: Performed by: STUDENT IN AN ORGANIZED HEALTH CARE EDUCATION/TRAINING PROGRAM

## 2020-04-08 PROCEDURE — 86140 C-REACTIVE PROTEIN: CPT

## 2020-04-08 PROCEDURE — 84100 ASSAY OF PHOSPHORUS: CPT

## 2020-04-08 PROCEDURE — 85652 RBC SED RATE AUTOMATED: CPT

## 2020-04-08 PROCEDURE — 99232 SBSQ HOSP IP/OBS MODERATE 35: CPT | Mod: GC,,, | Performed by: HOSPITALIST

## 2020-04-08 PROCEDURE — 36415 COLL VENOUS BLD VENIPUNCTURE: CPT

## 2020-04-08 PROCEDURE — 63600175 PHARM REV CODE 636 W HCPCS: Performed by: PHYSICIAN ASSISTANT

## 2020-04-08 PROCEDURE — 63700000 PHARM REV CODE 250 ALT 637 W/O HCPCS: Performed by: HOSPITALIST

## 2020-04-08 PROCEDURE — 99232 PR SUBSEQUENT HOSPITAL CARE,LEVL II: ICD-10-PCS | Mod: GC,,, | Performed by: HOSPITALIST

## 2020-04-08 PROCEDURE — 85025 COMPLETE CBC W/AUTO DIFF WBC: CPT

## 2020-04-08 PROCEDURE — 25000003 PHARM REV CODE 250: Performed by: PHYSICIAN ASSISTANT

## 2020-04-08 RX ORDER — LEVOFLOXACIN 750 MG/1
750 TABLET ORAL DAILY
Qty: 3 TABLET | Refills: 0 | Status: SHIPPED | OUTPATIENT
Start: 2020-04-08 | End: 2020-04-08 | Stop reason: HOSPADM

## 2020-04-08 RX ORDER — AMOXICILLIN AND CLAVULANATE POTASSIUM 875; 125 MG/1; MG/1
1 TABLET, FILM COATED ORAL 2 TIMES DAILY
Qty: 6 TABLET | Refills: 0 | Status: SHIPPED | OUTPATIENT
Start: 2020-04-08 | End: 2020-04-08 | Stop reason: HOSPADM

## 2020-04-08 RX ORDER — AZITHROMYCIN 500 MG/1
500 TABLET, FILM COATED ORAL DAILY
Qty: 3 TABLET | Refills: 0 | Status: SHIPPED | OUTPATIENT
Start: 2020-04-08 | End: 2020-04-08 | Stop reason: HOSPADM

## 2020-04-08 RX ADMIN — AZITHROMYCIN MONOHYDRATE 500 MG: 250 TABLET ORAL at 08:04

## 2020-04-08 RX ADMIN — FINASTERIDE 5 MG: 5 TABLET, FILM COATED ORAL at 09:04

## 2020-04-08 RX ADMIN — BACLOFEN 10 MG: 10 TABLET ORAL at 01:04

## 2020-04-08 RX ADMIN — CEFTRIAXONE SODIUM 1 G: 1 INJECTION, POWDER, FOR SOLUTION INTRAMUSCULAR; INTRAVENOUS at 08:04

## 2020-04-08 RX ADMIN — CILOSTAZOL 100 MG: 100 TABLET ORAL at 09:04

## 2020-04-08 RX ADMIN — ENOXAPARIN SODIUM 40 MG: 100 INJECTION SUBCUTANEOUS at 08:04

## 2020-04-08 RX ADMIN — CYANOCOBALAMIN TAB 250 MCG 500 MCG: 250 TAB at 09:04

## 2020-04-08 RX ADMIN — LISINOPRIL 10 MG: 10 TABLET ORAL at 09:04

## 2020-04-08 RX ADMIN — MAGNESIUM SULFATE HEPTAHYDRATE 1 G: 500 INJECTION, SOLUTION INTRAMUSCULAR; INTRAVENOUS at 03:04

## 2020-04-08 RX ADMIN — TAMSULOSIN HYDROCHLORIDE 0.4 MG: 0.4 CAPSULE ORAL at 09:04

## 2020-04-08 NOTE — HPI
Patient is a 74 year old gentleman with a h/o CVA, CAD h/o MI, BPH, HTN.  Patient is a poor historian (h/o dementia?.  HPI obtained from previous records.  He presents from [place] with SOB.  Patient underwent a CXR today secondary to cough.  CXR showed right sided pneumonia.  Patient denies SOB on exam. He denies fevers, chills, chest pain, abdominal pain, nausea, vomiting.

## 2020-04-08 NOTE — PLAN OF CARE
Pt AAOx3, resting comfortably. VS measured, afebrile. Pulse ox above 92% on 1LNC. Tele monitor in place. No c/o pain. Incontinent x2. Stage II to sacrum, clean and dry. Free from falls. Dependent. Non skid socks in place. Rounds made for safety, bed alarm set. Bed at lowest point, call light within reach with side rails up x3.

## 2020-04-08 NOTE — MEDICAL/APP STUDENT
Spoke to patient's brother, Tyler.  He was grateful for the call and happy that his brother was Covid-Negative.

## 2020-04-08 NOTE — PROGRESS NOTES
[facility] Hospital Medicine  Progress Note    Patient Name: Goyo Craig Jr.  MRN: [id number]  Patient Class: IP- Inpatient   Admission Date: 4/6/2020  Length of Stay: 2 days  Attending Physician: Regine Dhaliwal MD  Primary Care Provider: Chrissy Hayes MD    [facility] Medicine Team: [facility] 2 Cuco Barcenas MD    Subjective:     Principal Problem:Community acquired pneumonia    HPI:  Patient is a 74 year old gentleman with a h/o CVA, CAD h/o MI, BPH, HTN.  Patient is a poor historian (h/o dementia?.  HPI obtained from previous records.  He presents from [place] with SOB.  Patient underwent a CXR today secondary to cough.  CXR showed right sided pneumonia.  Patient denies SOB on exam. He denies fevers, chills, chest pain, abdominal pain, nausea, vomiting.    Overview/Hospital Course:  Patient admitted for evaluation of possible COVID-19. Patient afebrile and oxygen saturation is at 98% on 3L of nasal cannula.  Found to have right lower lung pneumonia on CXR. Covid-19 negative and started azithromycin and ceftriaxone for community acquired pneumonia.   Transfer patient to noncovid team on 4/8. O2 requirements have improved, now on room air. Patient reports feeling much better, denies SOB. Blood cultures positive for G+ cocci resembling staph, awaiting speciation to r/o MRSA. Will need to complete course of abx for CAP coverage.    Interval History: NAEON. AF and HD stable. Patient endorses some arthritic pain, but is otherwise w/o complaint stating that he feels much better. Denies SOB, cough, CP, fever, chills, fatigue, and abdominal pain.    Review of Systems   Constitutional: Positive for activity change. Negative for appetite change, chills, fatigue and fever.   HENT: Negative for congestion and hearing loss.    Eyes: Negative for visual disturbance.   Respiratory: Negative for cough, shortness of breath and wheezing.    Cardiovascular: Negative for chest pain and palpitations.    Gastrointestinal: Negative for abdominal pain, constipation, diarrhea, nausea and vomiting.   Genitourinary: Negative for dysuria and hematuria.   Musculoskeletal: Positive for arthralgias. Negative for myalgias.   Neurological: Negative for dizziness, weakness, light-headedness and headaches.   Psychiatric/Behavioral: Negative for agitation, behavioral problems and confusion.     Objective:     Vital Signs (Most Recent):  Temp: 98 °F (36.7 °C) (04/08/20 0736)  Pulse: 72 (04/08/20 0736)  Resp: 18 (04/08/20 0736)  BP: 129/78 (04/08/20 0736)  SpO2: 96 % (04/08/20 0736) Vital Signs (24h Range):  Temp:  [97.8 °F (36.6 °C)-98.8 °F (37.1 °C)] 98 °F (36.7 °C)  Pulse:  [72-78] 72  Resp:  [16-18] 18  SpO2:  [96 %-100 %] 96 %  BP: (111-132)/(64-78) 129/78     Weight: 83.3 kg (183 lb 8.5 oz)  Body mass index is 24.21 kg/m².  No intake or output data in the 24 hours ending 04/08/20 1414   Physical Exam   Constitutional: He is oriented to person, place, and time. He appears well-developed and well-nourished. No distress.   HENT:   Head: Normocephalic and atraumatic.   Eyes: Pupils are equal, round, and reactive to light. EOM are normal.   Neck: Normal range of motion. Neck supple.   Cardiovascular: Normal rate, regular rhythm, normal heart sounds and intact distal pulses.   Pulmonary/Chest: Effort normal. No respiratory distress.   On RA   Abdominal: Soft. Bowel sounds are normal. There is no tenderness.   Musculoskeletal: Normal range of motion. He exhibits no edema or tenderness.   Neurological: He is alert and oriented to person, place, and time.   Skin: Skin is warm and dry. No rash noted. He is not diaphoretic.   Psychiatric: He has a normal mood and affect. His behavior is normal.   Nursing note and vitals reviewed.      Significant Labs: All pertinent labs within the past 24 hours have been reviewed.    Significant Imaging: I have reviewed all pertinent imaging results/findings within the past 24  hours.      Assessment/Plan:      * Community acquired pneumonia  Patient is a 75 yo AAM who was admitted w/ SOB for evaluation of possible COVID-19  - CXR concerning for viral vs bacterial PNA  - Covid 19 and flu negative   - O2 requirements improved, now on RA  - Continue Ceftriaxone and azithromycin for CAP coverage  - Will likely transition to keflex on discharge to complete antibiotic course    Wound of groin  Wound care consulted 4/7/20     Essential hypertension  Continue lisinopril 10mg daily    BPH (benign prostatic hyperplasia)  Continue tamsulosin 0.4mg daily, finasteride 5mg daily    Debility  PT/OT to evaluate and treat      Coronary artery disease involving native coronary artery of native heart without angina pectoris  Continue secondary prevention      VTE Risk Mitigation (From admission, onward)         Ordered     enoxaparin injection 40 mg  Daily      04/07/20 0003     IP VTE HIGH RISK PATIENT  Once      04/07/20 0003                      Cuco Barcenas MD  Department of Hospital Medicine   [facility]

## 2020-04-08 NOTE — ASSESSMENT & PLAN NOTE
Patient is a 73 yo AAM who was admitted w/ SOB for evaluation of possible COVID-19  - CXR concerning for viral vs bacterial PNA  - Covid 19 and flu negative   - O2 requirements improved, now on RA  - Continue Ceftriaxone and azithromycin for CAP coverage  - Will likely transition to keflex on discharge to complete antibiotic course

## 2020-04-08 NOTE — PLAN OF CARE
04/08/20 1140   Post-Acute Status   Post-Acute Authorization Placement   Post-Acute Placement Status Awaiting Orders for SNF  (pending NH orders)   PERRY informed that patient medically stable for discharge and is awaiting culture results. PERRY outreached [facility] vis phone to update on patient being medically cleared for discharge either today or staci. Spoke with nurse who states admit dep. Is in a meeting at this time and will have [place] return SW call once done. SW will continue following patient. PERRY in communication with CM and patient care team.  Irma Vizcarra LMSW [facility]     (11:59AM) PERRY informed by CM that patient blood culture results will not be in until tomorrow morning. Patient anticipated discharge date is for staci, 4/9.   Irma Vizcarra LMSW [facility]     (12:38PM) PERRY called [facility] to update on patient discharge. No answer. Left voicemail requesting return call. PERRY sent message to provider via [employer] of patient anticipated dc date for tomorrow. Perry will continue to follow.  Irma Vizcarra LMSW [facility]

## 2020-04-08 NOTE — PLAN OF CARE
CM received message from Team that patient's blood culture results will not be available until tomorrow morning. Team removed discharge order. Notified SW to follow-up with [facility] staff. Anticipate d/c on 4/9/20.

## 2020-04-08 NOTE — SUBJECTIVE & OBJECTIVE
Interval History: NAEON. AF and HD stable. Patient endorses some arthritic pain, but is otherwise w/o complaint stating that he feels much better. Denies SOB, cough, CP, fever, chills, fatigue, and abdominal pain.    Review of Systems   Constitutional: Positive for activity change. Negative for appetite change, chills, fatigue and fever.   HENT: Negative for congestion and hearing loss.    Eyes: Negative for visual disturbance.   Respiratory: Negative for cough, shortness of breath and wheezing.    Cardiovascular: Negative for chest pain and palpitations.   Gastrointestinal: Negative for abdominal pain, constipation, diarrhea, nausea and vomiting.   Genitourinary: Negative for dysuria and hematuria.   Musculoskeletal: Positive for arthralgias. Negative for myalgias.   Neurological: Negative for dizziness, weakness, light-headedness and headaches.   Psychiatric/Behavioral: Negative for agitation, behavioral problems and confusion.     Objective:     Vital Signs (Most Recent):  Temp: 98 °F (36.7 °C) (04/08/20 0736)  Pulse: 72 (04/08/20 0736)  Resp: 18 (04/08/20 0736)  BP: 129/78 (04/08/20 0736)  SpO2: 96 % (04/08/20 0736) Vital Signs (24h Range):  Temp:  [97.8 °F (36.6 °C)-98.8 °F (37.1 °C)] 98 °F (36.7 °C)  Pulse:  [72-78] 72  Resp:  [16-18] 18  SpO2:  [96 %-100 %] 96 %  BP: (111-132)/(64-78) 129/78     Weight: 83.3 kg (183 lb 8.5 oz)  Body mass index is 24.21 kg/m².  No intake or output data in the 24 hours ending 04/08/20 1414   Physical Exam   Constitutional: He is oriented to person, place, and time. He appears well-developed and well-nourished. No distress.   HENT:   Head: Normocephalic and atraumatic.   Eyes: Pupils are equal, round, and reactive to light. EOM are normal.   Neck: Normal range of motion. Neck supple.   Cardiovascular: Normal rate, regular rhythm, normal heart sounds and intact distal pulses.   Pulmonary/Chest: Effort normal. No respiratory distress.   On RA   Abdominal: Soft. Bowel sounds are  normal. There is no tenderness.   Musculoskeletal: Normal range of motion. He exhibits no edema or tenderness.   Neurological: He is alert and oriented to person, place, and time.   Skin: Skin is warm and dry. No rash noted. He is not diaphoretic.   Psychiatric: He has a normal mood and affect. His behavior is normal.   Nursing note and vitals reviewed.      Significant Labs: All pertinent labs within the past 24 hours have been reviewed.    Significant Imaging: I have reviewed all pertinent imaging results/findings within the past 24 hours.

## 2020-04-08 NOTE — HOSPITAL COURSE
Patient admitted for evaluation of possible COVID-19. Patient afebrile and oxygen saturation is at 98% on 3L of nasal cannula.  Found to have right lower lung pneumonia on CXR. Covid-19 negative and started azithromycin and ceftriaxone for community acquired pneumonia.  Transfer patient to noncovid team on 4/8. O2 requirements have improved, now on room air. Patient reports feeling much better, denies SOB. Blood cultures with coag negative staph likely a contaminant, pt has been HD stable with out fever or hypotension. Repeat blood culture pending.

## 2020-04-09 VITALS
DIASTOLIC BLOOD PRESSURE: 63 MMHG | RESPIRATION RATE: 18 BRPM | BODY MASS INDEX: 24.33 KG/M2 | WEIGHT: 183.56 LBS | HEART RATE: 88 BPM | TEMPERATURE: 97 F | SYSTOLIC BLOOD PRESSURE: 105 MMHG | OXYGEN SATURATION: 94 % | HEIGHT: 73 IN

## 2020-04-09 LAB
BACTERIA BLD CULT: ABNORMAL

## 2020-04-09 PROCEDURE — 99238 HOSP IP/OBS DSCHRG MGMT 30/<: CPT | Mod: GC,,,

## 2020-04-09 PROCEDURE — 25000003 PHARM REV CODE 250: Performed by: PHYSICIAN ASSISTANT

## 2020-04-09 PROCEDURE — 36415 COLL VENOUS BLD VENIPUNCTURE: CPT

## 2020-04-09 PROCEDURE — 87040 BLOOD CULTURE FOR BACTERIA: CPT | Mod: 59

## 2020-04-09 PROCEDURE — 99238 PR HOSPITAL DISCHARGE DAY,<30 MIN: ICD-10-PCS | Mod: GC,,,

## 2020-04-09 RX ORDER — CEFPODOXIME PROXETIL 200 MG/1
200 TABLET, FILM COATED ORAL EVERY 12 HOURS
Qty: 4 TABLET | Refills: 0 | Status: SHIPPED | OUTPATIENT
Start: 2020-04-09 | End: 2020-04-11

## 2020-04-09 RX ADMIN — CYANOCOBALAMIN TAB 250 MCG 500 MCG: 250 TAB at 08:04

## 2020-04-09 RX ADMIN — FINASTERIDE 5 MG: 5 TABLET, FILM COATED ORAL at 08:04

## 2020-04-09 RX ADMIN — CILOSTAZOL 100 MG: 100 TABLET ORAL at 02:04

## 2020-04-09 RX ADMIN — LISINOPRIL 10 MG: 10 TABLET ORAL at 08:04

## 2020-04-09 RX ADMIN — CILOSTAZOL 100 MG: 100 TABLET ORAL at 10:04

## 2020-04-09 RX ADMIN — TAMSULOSIN HYDROCHLORIDE 0.4 MG: 0.4 CAPSULE ORAL at 08:04

## 2020-04-09 NOTE — PLAN OF CARE
04/09/20 1009   Post-Acute Status   Post-Acute Authorization Placement   Post-Acute Placement Status Set-up Complete     8:52 AM   SAQIB sent NH orders to [facility] via . SAQIB called [facility] .[phone] and spoke with Destiny. She reported Ree the ADON will review the NH orders and once she does, Destiny will return SW call.     9:49 AM  SAQIB received a call from Destiny. She reported the orders have been reviewed and Patient can be transferred. Destiny requested Patient arrive at facility by 3 pm. SAQIB informed [place] transportation will be arranged for 11 am. [facility] nurse can call report to [phone]. Patient will be going to room 84.    9:50 AM  SAQIB called Patient's nurse, Ree and informed her transportation is being arranged for 11:00 am provided number for her to call report.     SQAIB arranged stretcher transport via [facility]. Requested  time is 11:00 am.  Requested  time does not guarantee arrival time.      Cheyenne Melendez, JENNA  [facility]

## 2020-04-09 NOTE — PLAN OF CARE
04/09/20 1558   Final Note   Assessment Type Final Discharge Note   Anticipated Discharge Disposition [facility]   Pt returned to [facility].

## 2020-04-09 NOTE — PLAN OF CARE
Problem: Fall Injury Risk  Goal: Absence of Fall and Fall-Related Injury  Outcome: Ongoing, Progressing     Problem: Respiratory Compromise (Pneumonia)  Goal: Effective Oxygenation and Ventilation  Outcome: Ongoing, Progressing

## 2020-04-09 NOTE — PLAN OF CARE
[facility]     Department of Hospital Medicine     [address] [place] [address]     [phone] after hours  [phone] fax       NURSING HOME ORDERS    04/09/2020    Admit to Nursing Home:  Regular Bed         Diagnoses:  Active Hospital Problems    Diagnosis  POA    *Community acquired pneumonia [J18.9]  Yes    Essential hypertension [I10]  Yes     Chronic    Wound of groin [S31.109A]  Yes    BPH (benign prostatic hyperplasia) [N40.0]  Yes     Chronic    Debility [R53.81]  Yes    Coronary artery disease involving native coronary artery of native heart without angina pectoris [I25.10]  Yes     Chronic      Resolved Hospital Problems   No resolved problems to display.       Patient is homebound due to:  Community acquired pneumonia    Allergies:Review of patient's allergies indicates:  No Known Allergies    Vitals:  Per facility protocol     Diet: Regular diet     Acitivities:        - Up in a chair each morning as tolerated   - Ambulate with assistance to bathroom   - Scheduled walks once each shift (every 8 hours)   - May use walker, cane, or self-propelled wheelchair      LABS:  Per facility protocol    Nursing Precautions:   - Aspiration precautions:   -  Upright 90 degrees befor during and after meals    - Fall precautions per nursing home protocol   - Seizure precaution per custodial protocol   - Decubitus precautions:        -  for positioning   - Pressure reducing foam mattress   - Turn patient every two hours. Use wedge pillows to anchor patient    CONSULTS:       Physical Therapy to evaluate and treat     Occupational Therapy to evaluate and treat      Medications: Discontinue all previous medication orders, if any. See new list below.      Goyo Craig Jr.   Home Medication Instructions EDWAR:[id number]    Printed on:04/09/20 0758   Medication Information                      aspirin (ECOTRIN) 81 MG EC tablet  Take 81 mg by mouth  once daily.             baclofen (LIORESAL) 10 MG tablet  Take 10 mg by mouth 3 (three) times daily as needed (muscle spasm).             cefpodoxime (VANTIN) 200 MG tablet  Take 1 tablet (200 mg total) by mouth every 12 (twelve) hours. for 2 days             cilostazol (PLETAL) 100 MG Tab  Take 100 mg by mouth 2 (two) times daily.              clotrimazole-betamethasone 1-0.05% (LOTRISONE) cream  Apply topically 2 (two) times daily.             cyanocobalamin 500 MCG tablet  Take 500 mcg by mouth once daily.             finasteride (PROSCAR) 5 mg tablet  Take 5 mg by mouth once daily.             lisinopril 10 MG tablet  Take 10 mg by mouth once daily.             meloxicam (MOBIC) 7.5 MG tablet  Take 7.5 mg by mouth once daily. And as needed for arthritis pain              tamsulosin (FLOMAX) 0.4 mg Cp24  Take 0.4 mg by mouth once daily.               ____________________  Justin Harrison MD  04/09/2020

## 2020-04-09 NOTE — ASSESSMENT & PLAN NOTE
Patient is a 75 yo AAM who was admitted w/ SOB for evaluation of possible COVID-19  - CXR concerning for viral vs bacterial PNA  - Covid 19 and flu negative   - O2 requirements improved, now on RA  - Continue Ceftriaxone and azithromycin for CAP coverage  - discharged to NH with Cefpodoxime to complete 5 day course of Abx

## 2020-04-09 NOTE — DISCHARGE SUMMARY
[facility] Hospital Medicine  Discharge Summary      Patient Name: Goyo Craig Jr.  MRN: [id number]  Admission Date: 4/6/2020  Hospital Length of Stay: 3 days  Discharge Date and Time:  04/09/2020 12:57 PM  Attending Physician: Sekou Shah MD   Discharging Provider: Justin Harrison MD  Primary Care Provider: Chrissy Hayes MD  Hospital Medicine Team: [facility] MED 2 Justin Harrison MD    HPI:   Patient is a 74 year old gentleman with a h/o CVA, CAD h/o MI, BPH, HTN.  Patient is a poor historian (h/o dementia?.  HPI obtained from previous records.  He presents from [place] with SOB.  Patient underwent a CXR today secondary to cough.  CXR showed right sided pneumonia.  Patient denies SOB on exam. He denies fevers, chills, chest pain, abdominal pain, nausea, vomiting.    * No surgery found *      Hospital Course:   Patient admitted for evaluation of possible COVID-19. Patient afebrile and oxygen saturation is at 98% on 3L of nasal cannula.  Found to have right lower lung pneumonia on CXR. Covid-19 negative and started azithromycin and ceftriaxone for community acquired pneumonia.   Transfer patient to noncovid team on 4/8. O2 requirements have improved, now on room air. Patient reports feeling much better, denies SOB. Blood cultures with coag negative staph likely a contaminant, pt has been HD stable with out fever or hypotension. Repeat blood culture pending.      Vitals:    04/09/20 1122   BP: 105/63   Pulse: 88   Resp:    Temp: 97 °F (36.1 °C)     Physical Exam   Constitutional: He is oriented to person, place, and time. He appears well-developed and well-nourished. No distress.   HENT:   Head: Normocephalic and atraumatic.   Eyes: Pupils are equal, round, and reactive to light. EOM are normal.   Neck: Normal range of motion. Neck supple.   Cardiovascular: Normal rate, regular rhythm, normal heart sounds and intact distal pulses.   Pulmonary/Chest: Effort normal. No respiratory  distress.   On RA   Abdominal: Soft. Bowel sounds are normal. There is no tenderness.   Musculoskeletal: Normal range of motion. He exhibits no edema or tenderness.   Neurological: He is alert and oriented to person, place, and time.   Skin: Skin is warm and dry. No rash noted. He is not diaphoretic.   Psychiatric: He has a normal mood and affect. His behavior is normal.   Nursing note and vitals reviewed.    Consults:   Consults (From admission, onward)        Status Ordering Provider     Inpatient consult to Infection Control (Nurse)  Once     Provider:  (Not yet assigned)    DAX Gamble        * Community acquired pneumonia  Patient is a 73 yo AAM who was admitted w/ SOB for evaluation of possible COVID-19  - CXR concerning for viral vs bacterial PNA  - Covid 19 and flu negative   - O2 requirements improved, now on RA  - Continue Ceftriaxone and azithromycin for CAP coverage  - discharged to NH with Cefpodoxime to complete 5 day course of Abx    Essential hypertension  Continue lisinopril 10mg daily    BPH (benign prostatic hyperplasia)  Continue tamsulosin 0.4mg daily, finasteride 5mg daily    Debility  PT/OT to evaluate and treat      Coronary artery disease involving native coronary artery of native heart without angina pectoris  Continue secondary prevention      Final Active Diagnoses:    Diagnosis Date Noted POA    PRINCIPAL PROBLEM:  Community acquired pneumonia [J18.9] 04/06/2020 Yes    Essential hypertension [I10] 04/07/2020 Yes     Chronic    Wound of groin [S31.109A] 04/07/2020 Yes    BPH (benign prostatic hyperplasia) [N40.0] 03/18/2018 Yes     Chronic    Debility [R53.81] 06/28/2017 Yes    Coronary artery disease involving native coronary artery of native heart without angina pectoris [I25.10] 02/07/2017 Yes     Chronic      Problems Resolved During this Admission:       Discharged Condition: stable    Disposition:     Follow Up:    Patient Instructions:   No discharge  procedures on file.    Significant Diagnostic Studies: Labs:   CMP   Recent Labs   Lab 04/08/20  0532   *   K 3.7      CO2 26   GLU 77   BUN 13   CREATININE 0.7   CALCIUM 8.4*   PROT 6.8   ALBUMIN 2.3*   BILITOT 0.3   ALKPHOS 61   AST 9*   ALT 6*   ANIONGAP 4*   ESTGFRAFRICA >60.0   EGFRNONAA >60.0    and CBC   Recent Labs   Lab 04/08/20  0532   WBC 5.67   HGB 10.1*   HCT 33.0*          Pending Diagnostic Studies:     None         Medications:  Reconciled Home Medications:      Medication List      START taking these medications    cefpodoxime 200 MG tablet  Commonly known as:  VANTIN  Take 1 tablet (200 mg total) by mouth every 12 (twelve) hours. for 2 days        CONTINUE taking these medications    aspirin 81 MG EC tablet  Commonly known as:  ECOTRIN  Take 81 mg by mouth once daily.     baclofen 10 MG tablet  Commonly known as:  LIORESAL  Take 10 mg by mouth 3 (three) times daily as needed (muscle spasm).     cilostazoL 100 MG Tab  Commonly known as:  PLETAL  Take 100 mg by mouth 2 (two) times daily.     clotrimazole-betamethasone 1-0.05% cream  Commonly known as:  LOTRISONE  Apply topically 2 (two) times daily.     cyanocobalamin 500 MCG tablet  Take 500 mcg by mouth once daily.     finasteride 5 mg tablet  Commonly known as:  PROSCAR  Take 5 mg by mouth once daily.     lisinopriL 10 MG tablet  Take 10 mg by mouth once daily.     meloxicam 7.5 MG tablet  Commonly known as:  MOBIC  Take 7.5 mg by mouth once daily. And as needed for arthritis pain     tamsulosin 0.4 mg Cap  Commonly known as:  FLOMAX  Take 0.4 mg by mouth once daily.          Indwelling Lines/Drains at time of discharge:   Lines/Drains/Airways     None               Time spent on the discharge of patient: 45 minutes  Patient was seen and examined on the date of discharge and determined to be suitable for discharge.    Justin Harrison MD  Department of Hospital Medicine  [facility]

## 2020-04-09 NOTE — CARE UPDATE
Mr. Goyo Craig requires transportation via stretcher as he has limitations to ambulate independently. He is being discharged today to his Nursing home after a brief hospitalization.     Justin Harrison MD  Internal Medicine, PGY-3.

## 2020-04-10 LAB — BACTERIA BLD CULT: NORMAL

## 2020-04-13 LAB
BACTERIA BLD CULT: NORMAL
BACTERIA BLD CULT: NORMAL

## 2020-05-05 ENCOUNTER — OUTSIDE PLACE OF SERVICE (OUTPATIENT)
Dept: FAMILY MEDICINE | Facility: CLINIC | Age: 75
End: 2020-05-05
Payer: MEDICARE

## 2020-05-05 PROCEDURE — 99308 PR NURSING FAC CARE, SUBSEQ, MINOR COMPLIC: ICD-10-PCS | Mod: ,,, | Performed by: FAMILY MEDICINE

## 2020-05-05 PROCEDURE — 99308 SBSQ NF CARE LOW MDM 20: CPT | Mod: ,,, | Performed by: FAMILY MEDICINE

## 2020-09-02 NOTE — PLAN OF CARE
Plan of care reviewed with patient.  Fall precautions maintained. Bed in lowest position, locked, call light within reach and bed alarm is on. Side rails up x's 2 with slip resistant socks on. Neuro checks performed q 4 hrs. Nurse assessed patient throughout the shift for needs. Patient on telemetry throughout shift with no ectopy noted. Will continue to monitor.   left upper arm